# Patient Record
Sex: FEMALE | Race: WHITE | NOT HISPANIC OR LATINO | Employment: OTHER | URBAN - METROPOLITAN AREA
[De-identification: names, ages, dates, MRNs, and addresses within clinical notes are randomized per-mention and may not be internally consistent; named-entity substitution may affect disease eponyms.]

---

## 2017-09-06 ENCOUNTER — OFFICE VISIT (OUTPATIENT)
Dept: URGENT CARE | Facility: PHYSICIAN GROUP | Age: 82
End: 2017-09-06
Payer: COMMERCIAL

## 2017-09-06 VITALS
DIASTOLIC BLOOD PRESSURE: 64 MMHG | OXYGEN SATURATION: 93 % | SYSTOLIC BLOOD PRESSURE: 128 MMHG | RESPIRATION RATE: 24 BRPM | HEART RATE: 78 BPM | TEMPERATURE: 98.3 F | WEIGHT: 115 LBS

## 2017-09-06 DIAGNOSIS — N39.0 URINARY TRACT INFECTION WITH HEMATURIA, SITE UNSPECIFIED: Primary | ICD-10-CM

## 2017-09-06 DIAGNOSIS — R30.0 DYSURIA: ICD-10-CM

## 2017-09-06 DIAGNOSIS — R31.9 URINARY TRACT INFECTION WITH HEMATURIA, SITE UNSPECIFIED: Primary | ICD-10-CM

## 2017-09-06 LAB
APPEARANCE UR: ABNORMAL
BILIRUB UR STRIP-MCNC: ABNORMAL MG/DL
COLOR UR AUTO: YELLOW
GLUCOSE UR STRIP.AUTO-MCNC: ABNORMAL MG/DL
KETONES UR STRIP.AUTO-MCNC: ABNORMAL MG/DL
LEUKOCYTE ESTERASE UR QL STRIP.AUTO: ABNORMAL
NITRITE UR QL STRIP.AUTO: ABNORMAL
PH UR STRIP.AUTO: 6 [PH] (ref 5–8)
PROT UR QL STRIP: 300 MG/DL
RBC UR QL AUTO: ABNORMAL
SP GR UR STRIP.AUTO: 1.03
UROBILINOGEN UR STRIP-MCNC: 0.2 MG/DL

## 2017-09-06 PROCEDURE — 81002 URINALYSIS NONAUTO W/O SCOPE: CPT | Performed by: PHYSICIAN ASSISTANT

## 2017-09-06 PROCEDURE — 99203 OFFICE O/P NEW LOW 30 MIN: CPT | Performed by: PHYSICIAN ASSISTANT

## 2017-09-06 RX ORDER — THYROID 120 MG/1
120 TABLET ORAL DAILY
COMMUNITY
End: 2017-09-08

## 2017-09-06 RX ORDER — ASPIRIN 81 MG/1
81 TABLET, CHEWABLE ORAL DAILY
COMMUNITY
End: 2017-09-08

## 2017-09-06 RX ORDER — MULTIVIT WITH MINERALS/LUTEIN
1 TABLET ORAL DAILY
COMMUNITY
End: 2017-09-08

## 2017-09-06 RX ORDER — ATORVASTATIN CALCIUM 20 MG/1
20 TABLET, FILM COATED ORAL EVERY MORNING
Status: ON HOLD | COMMUNITY
End: 2020-02-12

## 2017-09-06 RX ORDER — METOPROLOL SUCCINATE 100 MG/1
100 TABLET, EXTENDED RELEASE ORAL DAILY
COMMUNITY
End: 2017-09-08

## 2017-09-06 RX ORDER — VALSARTAN 80 MG/1
80 TABLET ORAL DAILY
COMMUNITY
End: 2017-09-08

## 2017-09-06 RX ORDER — CHLORAL HYDRATE 500 MG
CAPSULE ORAL
COMMUNITY
End: 2017-09-08

## 2017-09-06 RX ORDER — CEFDINIR 300 MG/1
300 CAPSULE ORAL EVERY 12 HOURS
Qty: 14 CAP | Refills: 0 | Status: ON HOLD | OUTPATIENT
Start: 2017-09-06 | End: 2017-09-18

## 2017-09-07 ENCOUNTER — HOSPITAL ENCOUNTER (OUTPATIENT)
Facility: MEDICAL CENTER | Age: 82
End: 2017-09-07
Attending: PHYSICIAN ASSISTANT
Payer: COMMERCIAL

## 2017-09-07 PROCEDURE — 87086 URINE CULTURE/COLONY COUNT: CPT

## 2017-09-07 PROCEDURE — 87077 CULTURE AEROBIC IDENTIFY: CPT

## 2017-09-07 ASSESSMENT — ENCOUNTER SYMPTOMS
FEVER: 0
FLANK PAIN: 0
ABDOMINAL PAIN: 0

## 2017-09-08 ENCOUNTER — APPOINTMENT (OUTPATIENT)
Dept: RADIOLOGY | Facility: MEDICAL CENTER | Age: 82
DRG: 555 | End: 2017-09-08
Attending: EMERGENCY MEDICINE
Payer: COMMERCIAL

## 2017-09-08 ENCOUNTER — RESOLUTE PROFESSIONAL BILLING HOSPITAL PROF FEE (OUTPATIENT)
Dept: HOSPITALIST | Facility: MEDICAL CENTER | Age: 82
End: 2017-09-08
Payer: COMMERCIAL

## 2017-09-08 ENCOUNTER — HOSPITAL ENCOUNTER (INPATIENT)
Facility: MEDICAL CENTER | Age: 82
LOS: 10 days | DRG: 555 | End: 2017-09-18
Attending: EMERGENCY MEDICINE | Admitting: INTERNAL MEDICINE
Payer: COMMERCIAL
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
## 2017-09-08 DIAGNOSIS — N39.0 URINARY TRACT INFECTION WITH HEMATURIA, SITE UNSPECIFIED: ICD-10-CM

## 2017-09-08 DIAGNOSIS — R31.9 URINARY TRACT INFECTION WITH HEMATURIA, SITE UNSPECIFIED: ICD-10-CM

## 2017-09-08 DIAGNOSIS — N30.00 ACUTE CYSTITIS WITHOUT HEMATURIA: ICD-10-CM

## 2017-09-08 DIAGNOSIS — R53.1 WEAKNESS GENERALIZED: ICD-10-CM

## 2017-09-08 DIAGNOSIS — R30.0 DYSURIA: ICD-10-CM

## 2017-09-08 DIAGNOSIS — M25.551 HIP PAIN, RIGHT: ICD-10-CM

## 2017-09-08 PROBLEM — R29.6 FALLS FREQUENTLY: Status: ACTIVE | Noted: 2017-09-08
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
## 2017-09-08 PROBLEM — Z86.79 HISTORY OF AORTIC ANEURYSM: Status: ACTIVE | Noted: 2017-09-08

## 2017-09-08 PROBLEM — I10 HYPERTENSION: Status: ACTIVE | Noted: 2017-09-08

## 2017-09-08 LAB
ALBUMIN SERPL BCP-MCNC: 3.7 G/DL (ref 3.2–4.9)
ALBUMIN/GLOB SERPL: 1.5 G/DL
ALP SERPL-CCNC: 82 U/L (ref 30–99)
ALT SERPL-CCNC: 11 U/L (ref 2–50)
AMORPH CRY #/AREA URNS HPF: PRESENT /HPF
ANION GAP SERPL CALC-SCNC: 13 MMOL/L (ref 0–11.9)
APPEARANCE UR: ABNORMAL
APTT PPP: 28.6 SEC (ref 24.7–36)
AST SERPL-CCNC: 21 U/L (ref 12–45)
BACTERIA #/AREA URNS HPF: ABNORMAL /HPF
BASOPHILS # BLD AUTO: 0.3 % (ref 0–1.8)
BASOPHILS # BLD: 0.03 K/UL (ref 0–0.12)
BILIRUB SERPL-MCNC: 3.9 MG/DL (ref 0.1–1.5)
BILIRUB UR QL STRIP.AUTO: NEGATIVE
BUN SERPL-MCNC: 13 MG/DL (ref 8–22)
CALCIUM SERPL-MCNC: 9.5 MG/DL (ref 8.5–10.5)
CHLORIDE SERPL-SCNC: 104 MMOL/L (ref 96–112)
CO2 SERPL-SCNC: 20 MMOL/L (ref 20–33)
COLOR UR: ABNORMAL
CREAT SERPL-MCNC: 0.58 MG/DL (ref 0.5–1.4)
CULTURE IF INDICATED INDCX: YES UA CULTURE
EOSINOPHIL # BLD AUTO: 0.14 K/UL (ref 0–0.51)
EOSINOPHIL NFR BLD: 1.5 % (ref 0–6.9)
EPI CELLS #/AREA URNS HPF: ABNORMAL /HPF
ERYTHROCYTE [DISTWIDTH] IN BLOOD BY AUTOMATED COUNT: 47.1 FL (ref 35.9–50)
GFR SERPL CREATININE-BSD FRML MDRD: >60 ML/MIN/1.73 M 2
GLOBULIN SER CALC-MCNC: 2.5 G/DL (ref 1.9–3.5)
GLUCOSE SERPL-MCNC: 101 MG/DL (ref 65–99)
GLUCOSE UR STRIP.AUTO-MCNC: NEGATIVE MG/DL
HCT VFR BLD AUTO: 43.2 % (ref 37–47)
HGB BLD-MCNC: 14.7 G/DL (ref 12–16)
IMM GRANULOCYTES # BLD AUTO: 0.11 K/UL (ref 0–0.11)
IMM GRANULOCYTES NFR BLD AUTO: 1.2 % (ref 0–0.9)
INR PPP: 1.22 (ref 0.87–1.13)
KETONES UR STRIP.AUTO-MCNC: ABNORMAL MG/DL
LEUKOCYTE ESTERASE UR QL STRIP.AUTO: ABNORMAL
LYMPHOCYTES # BLD AUTO: 1.84 K/UL (ref 1–4.8)
LYMPHOCYTES NFR BLD: 20 % (ref 22–41)
MCH RBC QN AUTO: 29.3 PG (ref 27–33)
MCHC RBC AUTO-ENTMCNC: 34 G/DL (ref 33.6–35)
MCV RBC AUTO: 86.2 FL (ref 81.4–97.8)
MICRO URNS: ABNORMAL
MONOCYTES # BLD AUTO: 0.79 K/UL (ref 0–0.85)
MONOCYTES NFR BLD AUTO: 8.6 % (ref 0–13.4)
NEUTROPHILS # BLD AUTO: 6.27 K/UL (ref 2–7.15)
NEUTROPHILS NFR BLD: 68.4 % (ref 44–72)
NITRITE UR QL STRIP.AUTO: NEGATIVE
NRBC # BLD AUTO: 0 K/UL
NRBC BLD AUTO-RTO: 0 /100 WBC
PH UR STRIP.AUTO: 5.5 [PH]
PLATELET # BLD AUTO: 205 K/UL (ref 164–446)
PMV BLD AUTO: 10.3 FL (ref 9–12.9)
POTASSIUM SERPL-SCNC: 3.8 MMOL/L (ref 3.6–5.5)
PROT SERPL-MCNC: 6.2 G/DL (ref 6–8.2)
PROT UR QL STRIP: 100 MG/DL
PROTHROMBIN TIME: 15.8 SEC (ref 12–14.6)
RBC # BLD AUTO: 5.01 M/UL (ref 4.2–5.4)
RBC # URNS HPF: ABNORMAL /HPF
RBC UR QL AUTO: ABNORMAL
SODIUM SERPL-SCNC: 137 MMOL/L (ref 135–145)
SP GR UR STRIP.AUTO: 1.02
UROBILINOGEN UR STRIP.AUTO-MCNC: 1 MG/DL
WBC # BLD AUTO: 9.2 K/UL (ref 4.8–10.8)
WBC #/AREA URNS HPF: ABNORMAL /HPF

## 2017-09-08 PROCEDURE — 73551 X-RAY EXAM OF FEMUR 1: CPT | Mod: RT

## 2017-09-08 PROCEDURE — 81001 URINALYSIS AUTO W/SCOPE: CPT

## 2017-09-08 PROCEDURE — 71010 DX-CHEST-LIMITED (1 VIEW): CPT

## 2017-09-08 PROCEDURE — 96365 THER/PROPH/DIAG IV INF INIT: CPT

## 2017-09-08 PROCEDURE — 85610 PROTHROMBIN TIME: CPT

## 2017-09-08 PROCEDURE — 700102 HCHG RX REV CODE 250 W/ 637 OVERRIDE(OP): Performed by: STUDENT IN AN ORGANIZED HEALTH CARE EDUCATION/TRAINING PROGRAM

## 2017-09-08 PROCEDURE — 72170 X-RAY EXAM OF PELVIS: CPT

## 2017-09-08 PROCEDURE — 303105 HCHG CATHETER EXTRA

## 2017-09-08 PROCEDURE — 94760 N-INVAS EAR/PLS OXIMETRY 1: CPT

## 2017-09-08 PROCEDURE — 770020 HCHG ROOM/CARE - TELE (206)

## 2017-09-08 PROCEDURE — 87086 URINE CULTURE/COLONY COUNT: CPT

## 2017-09-08 PROCEDURE — 85730 THROMBOPLASTIN TIME PARTIAL: CPT

## 2017-09-08 PROCEDURE — 93005 ELECTROCARDIOGRAM TRACING: CPT | Performed by: EMERGENCY MEDICINE

## 2017-09-08 PROCEDURE — 51702 INSERT TEMP BLADDER CATH: CPT

## 2017-09-08 PROCEDURE — 700111 HCHG RX REV CODE 636 W/ 250 OVERRIDE (IP): Performed by: EMERGENCY MEDICINE

## 2017-09-08 PROCEDURE — 700105 HCHG RX REV CODE 258: Performed by: STUDENT IN AN ORGANIZED HEALTH CARE EDUCATION/TRAINING PROGRAM

## 2017-09-08 PROCEDURE — A9270 NON-COVERED ITEM OR SERVICE: HCPCS | Performed by: STUDENT IN AN ORGANIZED HEALTH CARE EDUCATION/TRAINING PROGRAM

## 2017-09-08 PROCEDURE — 99285 EMERGENCY DEPT VISIT HI MDM: CPT

## 2017-09-08 PROCEDURE — 80053 COMPREHEN METABOLIC PANEL: CPT

## 2017-09-08 PROCEDURE — 304561 HCHG STAT O2

## 2017-09-08 PROCEDURE — 36415 COLL VENOUS BLD VENIPUNCTURE: CPT

## 2017-09-08 PROCEDURE — 700105 HCHG RX REV CODE 258: Performed by: EMERGENCY MEDICINE

## 2017-09-08 PROCEDURE — 85025 COMPLETE CBC W/AUTO DIFF WBC: CPT

## 2017-09-08 RX ORDER — BISACODYL 10 MG
10 SUPPOSITORY, RECTAL RECTAL
Status: DISCONTINUED | OUTPATIENT
Start: 2017-09-08 | End: 2017-09-18 | Stop reason: HOSPADM

## 2017-09-08 RX ORDER — LEVOTHYROXINE SODIUM 0.1 MG/1
100 TABLET ORAL
Status: DISCONTINUED | OUTPATIENT
Start: 2017-09-09 | End: 2017-09-18 | Stop reason: HOSPADM

## 2017-09-08 RX ORDER — MULTIVIT-MIN/IRON/FOLIC ACID/K 18-600-40
2000 CAPSULE ORAL EVERY MORNING
Status: ON HOLD | COMMUNITY
End: 2020-02-12

## 2017-09-08 RX ORDER — HYDROCHLOROTHIAZIDE 12.5 MG/1
12.5 TABLET ORAL
Status: DISCONTINUED | OUTPATIENT
Start: 2017-09-09 | End: 2017-09-08

## 2017-09-08 RX ORDER — VALSARTAN 80 MG/1
160 TABLET ORAL
Status: DISCONTINUED | OUTPATIENT
Start: 2017-09-09 | End: 2017-09-11

## 2017-09-08 RX ORDER — VALSARTAN AND HYDROCHLOROTHIAZIDE 160; 12.5 MG/1; MG/1
1 TABLET, FILM COATED ORAL EVERY MORNING
Status: ON HOLD | COMMUNITY
End: 2017-09-18

## 2017-09-08 RX ORDER — AMOXICILLIN 250 MG
2 CAPSULE ORAL 2 TIMES DAILY
Status: DISCONTINUED | OUTPATIENT
Start: 2017-09-09 | End: 2017-09-18 | Stop reason: HOSPADM

## 2017-09-08 RX ORDER — ASPIRIN 81 MG/1
81 TABLET, CHEWABLE ORAL EVERY EVENING
Status: DISCONTINUED | OUTPATIENT
Start: 2017-09-08 | End: 2017-09-11

## 2017-09-08 RX ORDER — METOPROLOL SUCCINATE 200 MG/1
100 TABLET, EXTENDED RELEASE ORAL
COMMUNITY
End: 2020-02-07

## 2017-09-08 RX ORDER — CEFTRIAXONE 1 G/1
1 INJECTION, POWDER, FOR SOLUTION INTRAMUSCULAR; INTRAVENOUS ONCE
Status: COMPLETED | OUTPATIENT
Start: 2017-09-08 | End: 2017-09-08

## 2017-09-08 RX ORDER — SODIUM CHLORIDE 9 MG/ML
INJECTION, SOLUTION INTRAVENOUS CONTINUOUS
Status: DISCONTINUED | OUTPATIENT
Start: 2017-09-08 | End: 2017-09-16

## 2017-09-08 RX ORDER — MELATONIN 10 MG
1 CAPSULE ORAL
COMMUNITY
End: 2018-09-06

## 2017-09-08 RX ORDER — LEVOTHYROXINE SODIUM 0.1 MG/1
100 TABLET ORAL
Status: ON HOLD | COMMUNITY
End: 2020-02-12

## 2017-09-08 RX ORDER — CEFDINIR 300 MG/1
300 CAPSULE ORAL EVERY 12 HOURS
Status: DISCONTINUED | OUTPATIENT
Start: 2017-09-08 | End: 2017-09-11

## 2017-09-08 RX ORDER — ATORVASTATIN CALCIUM 20 MG/1
20 TABLET, FILM COATED ORAL NIGHTLY
Status: DISCONTINUED | OUTPATIENT
Start: 2017-09-08 | End: 2017-09-11

## 2017-09-08 RX ORDER — CALCIUM CITRATE/VITAMIN D3 200MG-6.25
1 TABLET ORAL EVERY MORNING
COMMUNITY
End: 2018-09-06

## 2017-09-08 RX ORDER — POLYETHYLENE GLYCOL 3350 17 G/17G
1 POWDER, FOR SOLUTION ORAL
Status: DISCONTINUED | OUTPATIENT
Start: 2017-09-08 | End: 2017-09-18 | Stop reason: HOSPADM

## 2017-09-08 RX ORDER — SODIUM CHLORIDE 9 MG/ML
INJECTION, SOLUTION INTRAVENOUS CONTINUOUS
Status: DISCONTINUED | OUTPATIENT
Start: 2017-09-08 | End: 2017-09-08

## 2017-09-08 RX ORDER — VALSARTAN AND HYDROCHLOROTHIAZIDE 160; 12.5 MG/1; MG/1
1 TABLET, FILM COATED ORAL EVERY MORNING
Status: DISCONTINUED | OUTPATIENT
Start: 2017-09-09 | End: 2017-09-08

## 2017-09-08 RX ADMIN — SODIUM CHLORIDE: 9 INJECTION, SOLUTION INTRAVENOUS at 23:45

## 2017-09-08 RX ADMIN — CEFDINIR 300 MG: 300 CAPSULE ORAL at 22:29

## 2017-09-08 RX ADMIN — CEFTRIAXONE SODIUM 1 G: 1 INJECTION, POWDER, FOR SOLUTION INTRAMUSCULAR; INTRAVENOUS at 19:29

## 2017-09-08 RX ADMIN — ASPIRIN 81 MG: 81 TABLET, CHEWABLE ORAL at 22:29

## 2017-09-08 RX ADMIN — ATORVASTATIN CALCIUM 20 MG: 20 TABLET, FILM COATED ORAL at 22:29

## 2017-09-08 RX ADMIN — SODIUM CHLORIDE: 9 INJECTION, SOLUTION INTRAVENOUS at 19:00

## 2017-09-08 ASSESSMENT — PATIENT HEALTH QUESTIONNAIRE - PHQ9
SUM OF ALL RESPONSES TO PHQ9 QUESTIONS 1 AND 2: 0
SUM OF ALL RESPONSES TO PHQ QUESTIONS 1-9: 0
1. LITTLE INTEREST OR PLEASURE IN DOING THINGS: NOT AT ALL
2. FEELING DOWN, DEPRESSED, IRRITABLE, OR HOPELESS: NOT AT ALL

## 2017-09-08 ASSESSMENT — ENCOUNTER SYMPTOMS
ORTHOPNEA: 0
FOCAL WEAKNESS: 0
TREMORS: 1
WEAKNESS: 1
WEIGHT LOSS: 0
PND: 0
CONSTIPATION: 0
HEARTBURN: 0
HEADACHES: 0
SORE THROAT: 0
PALPITATIONS: 0
FLANK PAIN: 0
DIARRHEA: 0
NERVOUS/ANXIOUS: 0
VOMITING: 0
SEIZURES: 0
BLURRED VISION: 0
NAUSEA: 0
DIZZINESS: 0
SHORTNESS OF BREATH: 0
STRIDOR: 0
DOUBLE VISION: 0
ABDOMINAL PAIN: 0
FEVER: 0
COUGH: 0
BLOOD IN STOOL: 0
FALLS: 1
SPEECH CHANGE: 0
CHILLS: 0
WHEEZING: 0
LOSS OF CONSCIOUSNESS: 0

## 2017-09-08 ASSESSMENT — LIFESTYLE VARIABLES
ALCOHOL_USE: NO
DO YOU DRINK ALCOHOL: NO
EVER_SMOKED: NEVER

## 2017-09-08 ASSESSMENT — PAIN SCALES - GENERAL
PAINLEVEL_OUTOF10: 1
PAINLEVEL_OUTOF10: 1

## 2017-09-08 NOTE — PROGRESS NOTES
Subjective:      Aurora Farias is a 85 y.o. female who presents with Dysuria (dark urine, foul odor)    Pt PMH, SocHx, SurgHx, FamHx, Drug allergies and medications reviewed with pt/EPIC.      Family history reviewed, it is not pertinent to this complaint.           Patient presents with:  Dysuria: dark urine, foul odor for a few days.           UTI   This is a new problem. The current episode started in the past 7 days. The problem occurs constantly. The problem has been unchanged. Associated symptoms include urinary symptoms. Pertinent negatives include no abdominal pain or fever. Nothing aggravates the symptoms. She has tried drinking for the symptoms. The treatment provided no relief.       Review of Systems   Constitutional: Negative for fever.   Gastrointestinal: Negative for abdominal pain.   Genitourinary: Positive for dysuria, frequency and urgency. Negative for flank pain and hematuria.   All other systems reviewed and are negative.         Objective:     /64   Pulse 78   Temp 36.8 °C (98.3 °F)   Resp (!) 24   Wt 52.2 kg (115 lb)   SpO2 93%      Physical Exam   Constitutional: She is oriented to person, place, and time. She appears well-developed and well-nourished. No distress.   HENT:   Head: Normocephalic and atraumatic.   Nose: Nose normal.   Mouth/Throat: Oropharynx is clear and moist.   Eyes: EOM are normal. Pupils are equal, round, and reactive to light.   Neck: Normal range of motion. Neck supple.   Cardiovascular: Normal rate, regular rhythm and normal heart sounds.    Pulmonary/Chest: Effort normal and breath sounds normal.   Abdominal: Soft. Bowel sounds are normal. There is no tenderness. There is no rebound and no guarding.   Musculoskeletal: Normal range of motion.   Neurological: She is alert and oriented to person, place, and time.   Skin: Skin is warm and dry. Capillary refill takes less than 2 seconds.   Psychiatric: She has a normal mood and affect.     UA: +LE, +  nitrites, blood     Assessment/Plan:     1. Urinary tract infection with hematuria, site unspecified  Urine Culture    cefdinir (OMNICEF) 300 MG Cap   2. Dysuria  POCT Urinalysis    Urine Culture    cefdinir (OMNICEF) 300 MG Cap     Will call with any changes after urine culture results are received.      PT should follow up with PCP in 1-2 days for re-evaluation if symptoms have not improved.  Discussed red flags and reasons to return to UC or ED.  Pt and/or family verbalized understanding of diagnosis and follow up instructions and declined informational handout on diagnosis.  PT discharged.

## 2017-09-09 ENCOUNTER — APPOINTMENT (OUTPATIENT)
Dept: RADIOLOGY | Facility: MEDICAL CENTER | Age: 82
DRG: 555 | End: 2017-09-09
Attending: STUDENT IN AN ORGANIZED HEALTH CARE EDUCATION/TRAINING PROGRAM
Payer: COMMERCIAL

## 2017-09-09 PROBLEM — I71.9 AORTIC ANEURYSM (HCC): Status: ACTIVE | Noted: 2017-09-09

## 2017-09-09 PROBLEM — E86.0 DEHYDRATION: Status: ACTIVE | Noted: 2017-09-09

## 2017-09-09 PROBLEM — E87.29 HIGH ANION GAP METABOLIC ACIDOSIS: Status: ACTIVE | Noted: 2017-09-09

## 2017-09-09 PROCEDURE — 700102 HCHG RX REV CODE 250 W/ 637 OVERRIDE(OP): Performed by: STUDENT IN AN ORGANIZED HEALTH CARE EDUCATION/TRAINING PROGRAM

## 2017-09-09 PROCEDURE — 770020 HCHG ROOM/CARE - TELE (206)

## 2017-09-09 PROCEDURE — 700111 HCHG RX REV CODE 636 W/ 250 OVERRIDE (IP): Performed by: STUDENT IN AN ORGANIZED HEALTH CARE EDUCATION/TRAINING PROGRAM

## 2017-09-09 PROCEDURE — 99222 1ST HOSP IP/OBS MODERATE 55: CPT | Mod: GC | Performed by: INTERNAL MEDICINE

## 2017-09-09 PROCEDURE — 700105 HCHG RX REV CODE 258: Performed by: STUDENT IN AN ORGANIZED HEALTH CARE EDUCATION/TRAINING PROGRAM

## 2017-09-09 PROCEDURE — 70450 CT HEAD/BRAIN W/O DYE: CPT

## 2017-09-09 PROCEDURE — A9270 NON-COVERED ITEM OR SERVICE: HCPCS | Performed by: STUDENT IN AN ORGANIZED HEALTH CARE EDUCATION/TRAINING PROGRAM

## 2017-09-09 RX ORDER — POLYETHYLENE GLYCOL 3350 17 G/17G
1 POWDER, FOR SOLUTION ORAL ONCE
Status: COMPLETED | OUTPATIENT
Start: 2017-09-09 | End: 2017-09-09

## 2017-09-09 RX ADMIN — CEFDINIR 300 MG: 300 CAPSULE ORAL at 08:42

## 2017-09-09 RX ADMIN — LEVOTHYROXINE SODIUM 100 MCG: 100 TABLET ORAL at 07:00

## 2017-09-09 RX ADMIN — STANDARDIZED SENNA CONCENTRATE AND DOCUSATE SODIUM 2 TABLET: 8.6; 5 TABLET, FILM COATED ORAL at 08:43

## 2017-09-09 RX ADMIN — VALSARTAN 160 MG: 80 TABLET ORAL at 08:41

## 2017-09-09 RX ADMIN — ATORVASTATIN CALCIUM 20 MG: 20 TABLET, FILM COATED ORAL at 21:43

## 2017-09-09 RX ADMIN — POLYETHYLENE GLYCOL 3350 1 PACKET: 17 POWDER, FOR SOLUTION ORAL at 21:44

## 2017-09-09 RX ADMIN — SODIUM CHLORIDE: 9 INJECTION, SOLUTION INTRAVENOUS at 06:40

## 2017-09-09 RX ADMIN — STANDARDIZED SENNA CONCENTRATE AND DOCUSATE SODIUM 2 TABLET: 8.6; 5 TABLET, FILM COATED ORAL at 21:43

## 2017-09-09 RX ADMIN — CEFDINIR 300 MG: 300 CAPSULE ORAL at 21:43

## 2017-09-09 RX ADMIN — ENOXAPARIN SODIUM 40 MG: 100 INJECTION SUBCUTANEOUS at 08:40

## 2017-09-09 RX ADMIN — SODIUM CHLORIDE: 9 INJECTION, SOLUTION INTRAVENOUS at 21:46

## 2017-09-09 RX ADMIN — ASPIRIN 81 MG: 81 TABLET, CHEWABLE ORAL at 21:43

## 2017-09-09 RX ADMIN — SODIUM CHLORIDE: 9 INJECTION, SOLUTION INTRAVENOUS at 13:29

## 2017-09-09 ASSESSMENT — COPD QUESTIONNAIRES
DO YOU EVER COUGH UP ANY MUCUS OR PHLEGM?: NO/ONLY WITH OCCASIONAL COLDS OR INFECTIONS
DURING THE PAST 4 WEEKS HOW MUCH DID YOU FEEL SHORT OF BREATH: NONE/LITTLE OF THE TIME
HAVE YOU SMOKED AT LEAST 100 CIGARETTES IN YOUR ENTIRE LIFE: NO/DON'T KNOW
COPD SCREENING SCORE: 3

## 2017-09-09 ASSESSMENT — PATIENT HEALTH QUESTIONNAIRE - PHQ9
1. LITTLE INTEREST OR PLEASURE IN DOING THINGS: NOT AT ALL
SUM OF ALL RESPONSES TO PHQ QUESTIONS 1-9: 0
SUM OF ALL RESPONSES TO PHQ9 QUESTIONS 1 AND 2: 0

## 2017-09-09 ASSESSMENT — ENCOUNTER SYMPTOMS
ORTHOPNEA: 0
SHORTNESS OF BREATH: 0
MEMORY LOSS: 1
BLURRED VISION: 0
CONSTIPATION: 0
CHILLS: 0
ABDOMINAL PAIN: 1
TINGLING: 0
SORE THROAT: 0
HEADACHES: 0
EYE PAIN: 0
SPUTUM PRODUCTION: 0
VOMITING: 0
DIZZINESS: 0
COUGH: 0
LOSS OF CONSCIOUSNESS: 0
NAUSEA: 0
CONSTIPATION: 1
SPEECH CHANGE: 0
SENSORY CHANGE: 0
FEVER: 0
DIARRHEA: 0
PALPITATIONS: 0
TREMORS: 0
WEAKNESS: 1
FLANK PAIN: 0
DOUBLE VISION: 0
WHEEZING: 0

## 2017-09-09 ASSESSMENT — PAIN SCALES - GENERAL
PAINLEVEL_OUTOF10: 0
PAINLEVEL_OUTOF10: 0
PAINLEVEL_OUTOF10: 3

## 2017-09-09 ASSESSMENT — LIFESTYLE VARIABLES: EVER_SMOKED: NEVER

## 2017-09-09 NOTE — PROGRESS NOTES
Internal Medicine Interval Note    Name Aurora Farias 10/19/1931   Age/Sex 85 y.o. female   MRN [id number]   Code Status DNR     After 5PM or if no immediate response to page, please call for cross-coverage  Attending/Team: Irena See Patient List for primary contact information  Call [phone] to page    1st Call - Day Intern (R1):   Dr. Dalal 2nd Call - Day Sr. Resident (R2/R3):   Dr. Beyer         Reason for interval visit  (Principal Problem)   Fall from ground level    Interval Problem Daily Status Update  (24 hours)   Per nursing pt had urinary incontinence x1 but is other times able to tell them when she needs to urinate.  Pt seen this morning on rounds, alert and oriented to self, situation, place. Not oriented to month or year, relatively confused and tired.   Pt c/o no BM since Tuesday.  Daughter arrived in the afternoon and was able to provide some information regarding her mother's baseline. Daughter states that the pt has had confusion for around 5 months, and daughter has noticed a decline since 3 months ago however the pt has only recently started living with the daughter and has not noticed a change in her condition since Wednesday. She would like to explore the possibility of her mother going to a SNF.   Daughter also informed us that her mother had a CT scan done in July due to a GLF with head injury. Records obtained from [facility] show a partially calcified extra-axial mass measuring 1.7cm c/w meningioma.    Review of Systems   Constitutional: Negative for chills and fever.   HENT: Negative for congestion and sore throat.    Eyes: Negative for blurred vision, double vision and pain.   Respiratory: Negative for cough and shortness of breath.    Cardiovascular: Negative for chest pain, palpitations and leg swelling.   Gastrointestinal: Positive for abdominal pain and constipation. Negative for diarrhea, nausea and vomiting.   Genitourinary: Negative for  dysuria and flank pain.   Skin: Negative.    Neurological: Positive for weakness. Negative for headaches.       Consultants/Specialty  None.    Disposition  Inpatient.    Quality Measures    Reviewed items::  EKG reviewed, Labs reviewed, Medications reviewed and Radiology images reviewed  Nance catheter::  No Nance  DVT prophylaxis pharmacological::  Enoxaparin (Lovenox)          Physical Exam       Vitals:    09/09/17 0400 09/09/17 0531 09/09/17 0800 09/09/17 1151   BP: 142/62  150/52 121/65   Pulse: 63 (!) 54 61 80   Resp: 18 18 17 16   Temp: 36.4 °C (97.5 °F)  36.2 °C (97.1 °F) 36.4 °C (97.5 °F)   SpO2: 92% 97% 90% 90%   Weight:       Height:         Body mass index is 23 kg/m². Weight: 58.9 kg (129 lb 13.6 oz)  Oxygen Therapy:  Pulse Oximetry: 90 %, O2 (LPM): 0, O2 Delivery: None (Room Air)    Physical Exam   Constitutional:   Frail, thin. No acute distress.   HENT:   Head: Normocephalic and atraumatic.   Mouth/Throat: Mucous membranes are dry.   Neck: No JVD present. No tracheal deviation present.   Cardiovascular: Normal rate and regular rhythm.    Murmur (systolic 2/6) heard.  Pulmonary/Chest: Effort normal and breath sounds normal. She has no wheezes. She has no rales.   Abdominal: Soft. There is tenderness (epigastrium).   Lymphadenopathy:     She has cervical adenopathy.   Neurological: She is alert. No cranial nerve deficit. GCS score is 15.   Oriented to self, person, location, situation. Not to date or month.   Skin: Skin is warm and dry.         Lab Data Review:         9/9/2017  1:08 PM    Recent Labs      09/08/17   1821   SODIUM  137   POTASSIUM  3.8   CHLORIDE  104   CO2  20   BUN  13   CREATININE  0.58   CALCIUM  9.5       Recent Labs      09/08/17   1821   ALTSGPT  11   ASTSGOT  21   ALKPHOSPHAT  82   TBILIRUBIN  3.9*   GLUCOSE  101*       Recent Labs      09/08/17   1821   RBC  5.01   HEMOGLOBIN  14.7   HEMATOCRIT  43.2   PLATELETCT  205   PROTHROMBTM  15.8*   APTT  28.6   INR  1.22*        Recent Labs      09/08/17   1821   WBC  9.2   NEUTSPOLYS  68.40   LYMPHOCYTES  20.00*   MONOCYTES  8.60   EOSINOPHILS  1.50   BASOPHILS  0.30   ASTSGOT  21   ALTSGPT  11   ALKPHOSPHAT  82   TBILIRUBIN  3.9*           Assessment/Plan     * Fall from ground level- (present on admission)   Assessment & Plan    Patient presented with GLF; mechanical trying to sit in chair, with no prior dizziness/palpitation/syncope/seizure.  Increased falls/unsteady gait over past few months. Of note, CT-head from July at [facility] showed incidental finding 1.7cm mass likely meningioma.  Orthostatics negative.  Right hip pain and right shoulder pain after the fall.  X ray pelvis showed no pelvic fracture,  X Ray femur no fracture.  Plan:  IVF   CT head w/o to evaluate for possible subdural, worsening mass/meningioma, or NPH.  Daughter would like to look into SNF placement.  PT/OT consult to evaluate for possible SNF        anion gap metabolic acidosis- (present on admission)   Assessment & Plan    AG 13 w/ normal lytes  liekly secondary to dehydration and poor oral intake.  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  Likely chronic given the patients history of aortic dissection.          Dehydration- (present on admission)   Assessment & Plan    Patient presented with Poor oral intake and loss of apetitite.  Clinically dry on exam  On home HCTZ  Dehydration not likely the direct cause of her fall as there was no dizzyness/lightheadedness reported, however fluids should still be given  Plan:  IV fluids started at 150, decrease today to 100ml/hr  Currently holding metoprolol and HCTZ          Hypertension- (present on admission)   Assessment & Plan    Past history of hypertension.  Hold home metoprolol and hydrochlorothiazide.   Continue valsartan.           Urinary tract infection- (present on admission)   Assessment & Plan    Recent history UTI presented to the urgent care with symptoms of dysuria ,hematuria and dark color urine.  Urinalysis showed positive leucocyte esterase. Culture not done at urgent care, culture drawn here is pending.  Currently on Cefdinir started 9/8.  Continue home cefdinir 300 mg.

## 2017-09-09 NOTE — SENIOR ADMIT NOTE
"[facility] SENIOR ADMIT NOTE:    Patient ID:   Name:             Aurora Farias     YOB: 1931  Age:                 85 y.o.  female   MRN:               [id number]                                                          Chief Complaint:       Frequent falls and recent UTI    History of Present Illness:    Mr. Farias is a 85 y.o. female with a PMHx of aortic dissection s/p repair, hypothyroidism, femoral fracture s/p internal fixation, osteopenia and recent diagnosis of UTI, who presents after a GLF. Per patient and family, she has frequent falls (3-4) during the last few months, seems to be mechanical without associated dizziness, palpitations or syncope. Family notes poor oral intake and gait instability on ambulation, with most recent incident earlier today, where patient was trying to sit on a chair and slipped out of chair and fell to ground on her right side, no reported head trauma, confusion, seizures or syncope.   Of note, patient was confused few days back, went to , diagnosed with urinary tract infection and started on Cefdinir (initial dose was yesterday)     ROS: Positive for falls, weakness, unsteady gait   EX: Looks very dry, systolic ejection murmur, no focal deficits   LABS: Positive for normal CBC and BMP, agap 13, INR 1.22, UA: LE positive    IMAGING: No femoral fracture. Aortic aneurysm on CXR  EKG:Non-specific ST/T wave changes with LVH and LAD    Active Ambulatory Problems     Diagnosis Date Noted   • No Active Ambulatory Problems     Resolved Ambulatory Problems     Diagnosis Date Noted   • No Resolved Ambulatory Problems     Past Medical History:   Diagnosis Date   • Aortic aneurysm without rupture (CMS-[facility])    • Hypertension    • Thyroid disorder        PHYSICAL EXAM  Vitals:   Weight/BMI: Body mass index is 26.57 kg/m².  Blood pressure 117/55, pulse 63, temperature 36.4 °C (97.5 °F), resp. rate 18, height 1.6 m (5' 3\"), weight 68 kg (150 lb), SpO2 98 " %.  Vitals:    09/08/17 1928 09/08/17 1930 09/08/17 2000 09/08/17 2030   BP:       Pulse:  64 60 63   Resp:       Temp:       SpO2:  97% 97% 98%   Weight: 68 kg (150 lb)      Height:         Oxygen Therapy:  Pulse Oximetry: 98 %, O2 (LPM): 2, O2 Delivery: Nasal Cannula      IMPRESSION  86 yo F with PMHx of aortic dissection presented after GLF with recent history of confusion and UTI, seems to be related to dehydration and physical deconditioning, will admit patient to tele and observe her overnight or any rhythm disorder and continue oral antibiotic therapy    ASSESSMENT:  - GLF (mechanical)  - Physical deconditioning   - Dehydration   - ?encephalopathy/UTI (resolving)   - AGMA (ketosis)  - Aortic aneurysm   - HTN  - H/o femoral fracture    PLAN:  - Admit to tele  - IV fluid therapy   - Orthostatic vitals  - Continue oral antibiotic therapy   - Continue home meds, hold metoprolol and HCTZ due low BP  - Check HbA1c, lipid panel, TSH  - PT/OT consult   - Primary team to consider CT chest for aortic aneurysm and to obtain old records     Please refer to Interns Dr. Olmedo H&P for complete admission details.

## 2017-09-09 NOTE — PROGRESS NOTES
Received report from night shift RN, pt is resting in bed, unlabored breathing and no signs of distress, call light and personal belongings in reach, bed alarm in place.

## 2017-09-09 NOTE — ASSESSMENT & PLAN NOTE
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  Likely chronic given the patients history of aortic dissection

## 2017-09-09 NOTE — FLOWSHEET NOTE
09/09/17 0531   Co-Sign Preceptor   Documentation reviewed by Preceptor? Yes   Patient History   Pulmonary Diagnosis no   Home O2 Use Prior To Admission? No   Home Treatments/Frequency No   COPD Risk Screening   Do you have a history of COPD? No   COPD Population Screener   During the past 4 weeks, how much did you feel short of breath? 0   Do you ever cough up any mucus or phlegm? 0   In the past 12 months, you do less than you used to because of your breathing problems 1   Have you smoked at least 100 cigarettes in your entire life? 0   How old are you? 2   COPD Screening Score 3   Smoking History   Have you Ever Smoked Never   Level Of Consciousness   Level of Consciousness Confused   Respiratory WDL   Respiratory (WDL) X   Chest Exam   Work Of Breathing / Effort Mild   Respiration 18   Pulse (!) 54   Heart Rate (Monitored) (!) 54   Breath Sounds   Pre/Post Intervention Pre Intervention Assessment   RUL Breath Sounds Clear   RML Breath Sounds Clear;Diminished   RLL Breath Sounds Diminished   JESUS Breath Sounds Clear   LLL Breath Sounds Diminished   O2 Protocol   O2 (LPM) 0   Pulse Oximetry 97 %

## 2017-09-09 NOTE — ASSESSMENT & PLAN NOTE
Recent history UTI presented to the urgent care with symptoms of dysuria ,hematuria and dark color urine. Urinalysis showed positive leucocyte esterase. Neg UCx2. Pt s/p 5 days of abx: 5 doses of cefdinir (9/8-9/10) and 3 doses of ceftriaxone (9/8, 9/11, 9/12). She currently denies any symptoms.  - Antibiotics d/c'ed 9/12/2017

## 2017-09-09 NOTE — ASSESSMENT & PLAN NOTE
Patient presented with GLF; mechanical trying to sit in chair, with no prior dizziness/palpitation/syncope/seizure. Increased falls/unsteady gait over past few months. CT-head from 07/17 at [facility] showed incidental finding 1.7cm mass likely meningioma. X ray pelvis showed no pelvic fracture,  X Ray femur no fracture. CT head w/o shows no change in meningioma from July. Daughter reports increased frequency of falls.  - PT/OT eval recommend SNF   - Pt awaiting SNF placement

## 2017-09-09 NOTE — ASSESSMENT & PLAN NOTE
Patient presented with Poor oral intake and loss of apetitite, clinically dry  Dehydration not likely the direct cause of her fall as there was no dizzyness/lightheadedness reported  Continue IVF  Currently holding metoprolol and HCTZ

## 2017-09-09 NOTE — ED PROVIDER NOTES
ED Provider Note    CHIEF COMPLAINT  Chief Complaint   Patient presents with   • Hip Pain       HPI  Aurora Farias is a 85 y.o. female who presentsTo the emergency department after falling down at home and now complaining of right hip pain. The patient is a very poor historian but says that she feels very tired and she tried to sit down in a chair at home but apparently missed the chair and landed on her right hip. Usually she gets around with a walker. The patient has had previous surgery on the right hip. The patient's daughter has arrived and says that the patient just moved to this area and she was seen 2 days ago at an urgent care and diagnosed with urinary tract infection and started on Omnicef. Subsequently the patient seems confused and is very tired and today fell as noted above.    REVIEW OF SYSTEMS the patient did not strike her head or lose consciousness no neck or spine pain or chest pain or difficulty breathing. Apparently she initially complained of right shoulder pain but now is moving her shoulder and says it does not hurt. All other systems negative    PAST MEDICAL HISTORY  Past Medical History:   Diagnosis Date   • Aortic aneurysm without rupture (CMS-[facility])     also 2nd AA   • Hypertension    • Thyroid disorder        FAMILY HISTORY  History reviewed. No pertinent family history.    SOCIAL HISTORY  Social History     Social History   • Marital status:      Spouse name: N/A   • Number of children: N/A   • Years of education: N/A     Social History Main Topics   • Smoking status: Never Smoker   • Smokeless tobacco: Never Used   • Alcohol use No   • Drug use: No   • Sexual activity: Not on file     Other Topics Concern   • Not on file     Social History Narrative   • No narrative on file       SURGICAL HISTORY  Past Surgical History:   Procedure Laterality Date   • OTHER CARDIAC SURGERY      aneurysm repair       CURRENT MEDICATIONS  Home Medications    **Home medications have not yet been  "reviewed for this encounter**         ALLERGIES  Allergies   Allergen Reactions   • Penicillins        PHYSICAL EXAM  VITAL SIGNS: /55   Pulse 64   Temp 36.4 °C (97.5 °F)   Resp 18   Ht 1.6 m (5' 3\")   Wt 68 kg (150 lb)   SpO2 97%   BMI 26.57 kg/m²    Oxygen saturation is interpreted asAdequate  Constitutional: The patient is awake she is verbal but she seems very weak  HENT: I don't see any sign of trauma to the head mucous membranes are dry  Eyes: No erythema or discharge or jaundice  Neck: Trachea midline no JVD no C-spine tenderness  Cardiovascular: Regular rate and rhythm  Lungs: Clear and equal bilaterally with no apparent difficulty breathing  Abdomen/Back: Soft nontender nondistended, the pelvis is stable  Skin: Warm and dry  Musculoskeletal: The patient is mildly tender over the right hip area but she is moving the hip without any apparent difficulty or much discomfort.  Neurologic: Awake verbal moving extremities but slightly somnolent    CHART REVIEW  I reviewed a urinalysis from 2 days ago which was positive for nitrite and leukocyte esterase at that time and it appears that a urine culture is in process in the laboratory but no results have been reported thus far    Laboratory  In the emergency [facility] guys are obtained a CBC shows white blood count of 9.2 the INR is 1.22 and urinalysis was obtained I catheterization the bladder it is positive for large leukocyte esterase and the microscopic exam shows a packed field of white blood cells and also bacteria.    Radiology  DX-PELVIS-1 OR 2 VIEWS   Final Result      1.  Internal fixation of RIGHT proximal femur.   2.  Diffuse osteopenia.   3.  No pelvic fracture.      DX-FEMUR-1 VIEW RIGHT   Final Result      1.  No fracture of the RIGHT femur.   2.  Prior internal fixation of proximal RIGHT femur.   3.  Moderate to severe degenerative change of RIGHT hip and knee.      DX-CHEST-LIMITED (1 VIEW)   Final Result      1.  " There is aneurysmal dilatation of the thoracic aortic arch, probably chronic.   2.  Possible right mid hemithorax lung nodule.   3.  There are surgical clips in the right axilla.        MEDICAL DECISION MAKING and DISPOSITION  In the emergency department an IV was established the patient was placed on a cardiac monitor she had received intravenous fentanyl from the EMS crew prior to arrival and required oxygen by nasal cannula thereafter. The patient was given intravenous ceftriaxone. I reviewed all the findings with the patient's family and I reviewed the case with the [facility] internal medicine resident and the patient is admitted to the resident service for further evaluation and treatment    IMPRESSION  1. Urinary tract infection  2. Mechanical ground-level fall  3. Right hip pain         Electronically signed by: Samuel Lauren, 9/8/2017 8:01 PM

## 2017-09-09 NOTE — H&P
"      Internal Medicine Admitting History and Physical    Name Aurora Farias 10/19/1931   Age/Sex 85 y.o. female   MRN [id number]   Code Status DNR     After 5PM or if no immediate response to page, please call for cross-coverage  Attending/Team:Jorge Luis Troy MD See Patient List for primary contact information  Call [phone] to page    1st Call - Day Intern (R1):   Jonah Cleveland MD 2nd Call - Day Sr. Resident (R2/R3):   Bogdan Beyer MD       Chief Complaint:  Ground level fall  Right hip pain    HPI:  This is a 85-year-old female with a past medical history of hypertension, hypothyroidism, aortic dissection status post mesh placement , internal fixation of right proximal femur presents to the emergency department after mechanical Ground level fall.  She ambulates with walker at baseline and this evening while she was sitting on the chair she missed the chair.Her Daughter heard that she was saying \"iI am falling, I am falling\" and the daughter saw her falling on her right side.She complained of right hip pain and shoulder pain soon after the fall. She had two episodes of fall this week and off late she has been having increased episodes of falls in the past five months. She denies dizziness, lightheadedness or palpitations prior to the fall.  At the time of evaluation she does not have right hip pain and right shoulder pain.She denies head trauma, confusion, syncope or seizures after the fall.Her daughter notices unsteady gait on ambulating and thinks it has been increased progressively over the past three years.  She is into the urgent care yesterday for complaints of dysuria, hematuria and frequency. She is currently being treated for UTI and is taking antibiotics.  Since two days she is more confused per daughter and she noticed decreased oral intake of water and food.  She has a past history of aortic dissection and placement of mesh.  She denies chest pain, shortness of breath, " fever, chills , flank pain, nausea and vomiting.    Review of Systems   Constitutional: Negative for chills, fever and weight loss.   HENT: Negative for congestion and sore throat.    Eyes: Negative for blurred vision and double vision.   Respiratory: Negative for cough, shortness of breath, wheezing and stridor.    Cardiovascular: Negative for chest pain, palpitations, orthopnea, leg swelling and PND.   Gastrointestinal: Negative for abdominal pain, blood in stool, constipation, diarrhea, heartburn, nausea and vomiting.   Genitourinary: Positive for dysuria, frequency and hematuria. Negative for flank pain.   Musculoskeletal: Positive for falls.        Positive for Unsteady gait   Neurological: Positive for tremors and weakness. Negative for dizziness, speech change, focal weakness, seizures, loss of consciousness and headaches.   Psychiatric/Behavioral: The patient is not nervous/anxious.              Past Medical History:   Past Medical History:   Diagnosis Date   • Aortic aneurysm without rupture (CMS-HCC)     also 2nd AA   • Hypertension    • Thyroid disorder        Past Surgical History:  Past Surgical History:   Procedure Laterality Date   • OTHER CARDIAC SURGERY      aneurysm repair       Current Outpatient Medications:  Home Medications     Reviewed by Camila Hopper (Pharmacy Tech) on 09/08/17 at 2001  Med List Status: Complete   Medication Last Dose Status   aspirin 81 MG tablet 9/7/2017 Active   atorvastatin (LIPITOR) 20 MG Tab 9/7/2017 Active   B Complex-C (SUPER B COMPLEX PO) 9/7/2017 Active   cefdinir (OMNICEF) 300 MG Cap 9/8/2017 Active   Cranberry-Vitamin C-Vitamin E (CRANBERRY PLUS VITAMIN C) 4200-20-3 MG-MG-UNIT Cap 9/8/2017 Active   Ferrous Gluconate (IRON) 240 (27 Fe) MG Tab 9/7/2017 Active   levothyroxine (SYNTHROID) 100 MCG Tab 9/8/2017 Active   Melatonin 10 MG Cap 9/7/2017 Active   metoprolol (TOPROL-XL) 200 MG XL tablet 9/7/2017 Active   Multiple Vitamins-Minerals (MULTIVITAMIN  "GUMMIES WOMENS) Chew Tab 9/8/2017 Active   Omega-3 Fatty Acids (OMEGA-3 PO) 9/7/2017 Active   valsartan-hydrochlorothiazide (DIOVAN-HCT) 160-12.5 MG per tablet 9/8/2017 Active   vitamin D (CHOLECALCIFEROL) 1000 UNIT Tab 9/8/2017 Active                Medication Allergy/Sensitivities:  Allergies   Allergen Reactions   • Penicillins          Family History:  History reviewed. No pertinent family history.    Social History:  Social History     Social History   • Marital status:      Spouse name: N/A   • Number of children: N/A   • Years of education: N/A     Occupational History   • Not on file.     Social History Main Topics   • Smoking status: Never Smoker   • Smokeless tobacco: Never Used   • Alcohol use No   • Drug use: No   • Sexual activity: Not on file     Other Topics Concern   • Not on file     Social History Narrative   • No narrative on file     Living situation: Lives with Daughter.  PCP : None.      Physical Exam     Vitals:    09/08/17 1928 09/08/17 1930 09/08/17 2000 09/08/17 2030   BP:       Pulse:  64 60 63   Resp:       Temp:       SpO2:  97% 97% 98%   Weight: 68 kg (150 lb)      Height:         Body mass index is 26.57 kg/m².  /55   Pulse 63   Temp 36.4 °C (97.5 °F)   Resp 18   Ht 1.6 m (5' 3\")   Wt 68 kg (150 lb)   SpO2 98%   BMI 26.57 kg/m²   O2 therapy: Pulse Oximetry: 98 %, O2 (LPM): 2, O2 Delivery: Nasal Cannula    Physical Exam  Constitutional:awake and alert.Appears weak.Not in acute distress.  HEENT: atraumatic, normocephalic. Dry mucus membranes.  Eyes: PERRLA, drooping of the left eyelid is noted. EOM normal.Conjunctivae normal.  Neck: supple no stridor or no tenderness. No lymphadenopathy.  Cardiovascular: regular rate and rhythm with systolic ejection murmur heard.  Respiratory/chest wall: clear to ausculation bilaterally.No respiratory distress, no chest wall tenderness  Abdomen:soft, non tender, non distended and normal bowel sounds are heard.  Musculoskeletal:Normal " range of motion in all major joints.No edema or cyanosis .  Extremities: Normal right hip joint range of motion.Exhibits no tenderness.  Neurologic: alert, awake and oriented to place,person. Cranial nerves 2-12 intact.no focal deficits noted.  Psychiatric:affect normal.      Data Review     Current Records review and summary: Completed    Lab Data Review:  Recent Results (from the past 24 hour(s))   CBC w/ Differential    Collection Time: 09/08/17  6:21 PM   Result Value Ref Range    WBC 9.2 4.8 - 10.8 K/uL    RBC 5.01 4.20 - 5.40 M/uL    Hemoglobin 14.7 12.0 - 16.0 g/dL    Hematocrit 43.2 37.0 - 47.0 %    MCV 86.2 81.4 - 97.8 fL    MCH 29.3 27.0 - 33.0 pg    MCHC 34.0 33.6 - 35.0 g/dL    RDW 47.1 35.9 - 50.0 fL    Platelet Count 205 164 - 446 K/uL    MPV 10.3 9.0 - 12.9 fL    Neutrophils-Polys 68.40 44.00 - 72.00 %    Lymphocytes 20.00 (L) 22.00 - 41.00 %    Monocytes 8.60 0.00 - 13.40 %    Eosinophils 1.50 0.00 - 6.90 %    Basophils 0.30 0.00 - 1.80 %    Immature Granulocytes 1.20 (H) 0.00 - 0.90 %    Nucleated RBC 0.00 /100 WBC    Neutrophils (Absolute) 6.27 2.00 - 7.15 K/uL    Lymphs (Absolute) 1.84 1.00 - 4.80 K/uL    Monos (Absolute) 0.79 0.00 - 0.85 K/uL    Eos (Absolute) 0.14 0.00 - 0.51 K/uL    Baso (Absolute) 0.03 0.00 - 0.12 K/uL    Immature Granulocytes (abs) 0.11 0.00 - 0.11 K/uL    NRBC (Absolute) 0.00 K/uL   Complete Metabolic Panel (CMP)    Collection Time: 09/08/17  6:21 PM   Result Value Ref Range    Sodium 137 135 - 145 mmol/L    Potassium 3.8 3.6 - 5.5 mmol/L    Chloride 104 96 - 112 mmol/L    Co2 20 20 - 33 mmol/L    Anion Gap 13.0 (H) 0.0 - 11.9    Glucose 101 (H) 65 - 99 mg/dL    Bun 13 8 - 22 mg/dL    Creatinine 0.58 0.50 - 1.40 mg/dL    Calcium 9.5 8.5 - 10.5 mg/dL    AST(SGOT) 21 12 - 45 U/L    ALT(SGPT) 11 2 - 50 U/L    Alkaline Phosphatase 82 30 - 99 U/L    Total Bilirubin 3.9 (H) 0.1 - 1.5 mg/dL    Albumin 3.7 3.2 - 4.9 g/dL    Total Protein 6.2 6.0 - 8.2 g/dL    Globulin 2.5 1.9 - 3.5  g/dL    A-G Ratio 1.5 g/dL   Prothrombin (PT/INR)    Collection Time: 17  6:21 PM   Result Value Ref Range    PT 15.8 (H) 12.0 - 14.6 sec    INR 1.22 (H) 0.87 - 1.13   APTT    Collection Time: 17  6:21 PM   Result Value Ref Range    APTT 28.6 24.7 - 36.0 sec   ESTIMATED GFR    Collection Time: 17  6:21 PM   Result Value Ref Range    GFR If African American >60 >60 mL/min/1.73 m 2    GFR If Non African American >60 >60 mL/min/1.73 m 2   EKG (NOW)    Collection Time: 17  6:33 PM   Result Value Ref Range    Report       [facility] Emergency Dept[facility]    Test Date:  2017  Pt Name:    LOUIE ROTH            Department: ER  MRN:        [id number]                      Room:       [facility]  Gender:     F                            Technician: [id number]  :        [id number]                   Requested By:MADELAINE SLAUGHTER  Order #:    [id number]                    Reading MD:    Measurements  Intervals                                Axis  Rate:       64                           P:          -8  AK:         228                          QRS:        -36  QRSD:       112                          T:          243  QT:         428  QTc:        442    Interpretive Statements  SINUS RHYTHM  FIRST DEGREE AV BLOCK  LVH WITH IVCD, LAD AND SECONDARY REPOL ABNRM  No previous ECG available for comparison     Urinalysis, culture if indicated    Collection Time: 17  6:51 PM   Result Value Ref Range    Color DK Yellow     Character Turbid (A)     Specific Gravity 1.024 <1.035    Ph 5.5 5.0 - 8.0    Glucose Negative Negative mg/dL    Ketones Trace (A) Negative mg/dL    Protein 100 (A) Negative mg/dL    Bilirubin Negative Negative    Urobilinogen, Urine 1.0 Negative    Nitrite Negative Negative    Leukocyte Esterase Large (A) Negative    Occult Blood Small (A) Negative    Micro Urine Req Microscopic     Culture Indicated Yes UA Culture   URINE MICROSCOPIC (W/UA)    Collection Time: 17  6:51 PM    Result Value Ref Range    WBC Packed WBC /hpf    RBC 5-10 (A) /hpf    Bacteria Few (A) None /hpf    Epithelial Cells Few /hpf    Amorphous Crystal Present /hpf       Imaging/Procedures Review:    ndependant Imaging Review: Completed  DX-PELVIS-1 OR 2 VIEWS   Final Result      1.  Internal fixation of RIGHT proximal femur.   2.  Diffuse osteopenia.   3.  No pelvic fracture.      DX-FEMUR-1 VIEW RIGHT   Final Result      1.  No fracture of the RIGHT femur.   2.  Prior internal fixation of proximal RIGHT femur.   3.  Moderate to severe degenerative change of RIGHT hip and knee.      DX-CHEST-LIMITED (1 VIEW)   Final Result      1.  There is aneurysmal dilatation of the thoracic aortic arch, probably chronic.   2.  Possible right mid hemithorax lung nodule.   3.  There are surgical clips in the right axilla.         EKG:   EKG Independant Review: Completed  QTc:442, HR: 64, Normal Sinus Rhythm, non specific ST/T changes with LVH.    Records reviewed and summarized in current documentation         Assessment/Plan     1)Mechanical ground level fall:  Patient presented with an episode of ground level fall mechanical with no prior symptoms.  Two episodes in this week.  Right hip pain and right shoulder pain after the fall.  X ray pelvis showed no pelvic fracture,  X Ray femur no fracture.  en route was given fentanyl which relieved the pian.  Likely secondary to dehydration and poor oral intake, increased confusion and physical deconditioning.  Plan:  inpatient admit to telmetry on cardiac monitoring.  Orthostatic vitals.  IV fluid therapy.  PT/OT consult.  Hold Hydrochlorothiazide and metoprolol.    2) Dehydration:  Patient presented with Poor oral intake and loss of apetitite.  Recent history of UTI.  Examination- Dry mucus membranes.  Plan:  Iv fluid therapy at 150 ml/hr  Monitor the vital signs.  Hold metoprolol and hydrochlorothiazide     3) Urinary tract infection:  Recent history UTI presented to the urgent care  with symptoms of dysuria ,hematuria and dark color urine.  Urinalysis showed positive leucocyte esterase .   Currently on Cefdinir started yesterday morning.  Continue home cefdinir 300 mg.  Ceftriaxone 1g given in the ED.    4) Anion gap metabolic acidosis:  On admission An ion gap is increased 13. Sodium and potassium is normal.  liekly secondary to dehydration and poor oral intake.  Monitor BMP to assess the gap.  IV fluids.    5)Aortic aneurysm:  Past history of aortic dissection with mesh placement  Chest x ray showed dilation of thoracic aortic arch aneurysm.  Probably Chronic given the patients history of aortic dissection.  Primary tea to consider Ct chest for aortic aneurysm.    6) Hypertension:  Past history of hypertension.  Hold home metoprolol and hydrochlorothiazide.   Continue valsartan.  Lipid panel, HbA1C and TSH is ordered.    Anticipated Hospital stay:  >2 midnights  Quality Measures    Reviewed items::  EKG reviewed, Labs reviewed, Medications reviewed and Radiology images reviewed  DVT prophylaxis pharmacological::  Enoxaparin (Lovenox)

## 2017-09-09 NOTE — CARE PLAN
Problem: Safety  Goal: Will remain free from injury  Outcome: PROGRESSING AS EXPECTED  Treaded socks on, bed locked & low, call light and belongings within reach, alarm on.       Problem: Knowledge Deficit  Goal: Knowledge of disease process/condition, treatment plan, diagnostic tests, and medications will improve  Outcome: PROGRESSING AS EXPECTED  POC discussed with patient at bedside.

## 2017-09-09 NOTE — ED NOTES
Med rec completed per pt family with pt's RX bottles at bedside  Bottles reviewed and returned to family  Allergies reviewed  Pt started a 7 day course of cefdinir on 9/6/17

## 2017-09-09 NOTE — ASSESSMENT & PLAN NOTE
[resolved]  Initially AG 13 w/ normal lytes,   Likely secondary to dehydration and poor oral intake.  AG now 7 following IVF

## 2017-09-09 NOTE — ED NOTES
BIB [facility] from home.  Reports [facility] GLF w/ R hip pain.  Pt reports walked to table w/ walker missed the chair hit chair arm and fell on R hip.  Pt able to  L leg and cross over L leg w/o pain. 1/10.  GCS 15,  Fentanyl 50 mcg en route. Pt sleepy.  126/66,  Hrs 67.  r 20, (%% on 2 L/n/c after fentanyl.    Pt has current UTI per daughter, daughter coming in.

## 2017-09-10 LAB
ALBUMIN SERPL BCP-MCNC: 2.9 G/DL (ref 3.2–4.9)
ALBUMIN/GLOB SERPL: 1.5 G/DL
ALP SERPL-CCNC: 69 U/L (ref 30–99)
ALT SERPL-CCNC: 8 U/L (ref 2–50)
ANION GAP SERPL CALC-SCNC: 7 MMOL/L (ref 0–11.9)
AST SERPL-CCNC: 13 U/L (ref 12–45)
BACTERIA UR CULT: NORMAL
BACTERIA UR CULT: NORMAL
BILIRUB SERPL-MCNC: 3 MG/DL (ref 0.1–1.5)
BUN SERPL-MCNC: 5 MG/DL (ref 8–22)
CALCIUM SERPL-MCNC: 8.2 MG/DL (ref 8.5–10.5)
CHLORIDE SERPL-SCNC: 113 MMOL/L (ref 96–112)
CO2 SERPL-SCNC: 19 MMOL/L (ref 20–33)
CREAT SERPL-MCNC: 0.32 MG/DL (ref 0.5–1.4)
EKG IMPRESSION: NORMAL
GFR SERPL CREATININE-BSD FRML MDRD: >60 ML/MIN/1.73 M 2
GLOBULIN SER CALC-MCNC: 1.9 G/DL (ref 1.9–3.5)
GLUCOSE SERPL-MCNC: 92 MG/DL (ref 65–99)
POTASSIUM SERPL-SCNC: 3.3 MMOL/L (ref 3.6–5.5)
PROT SERPL-MCNC: 4.8 G/DL (ref 6–8.2)
SIGNIFICANT IND 70042: NORMAL
SIGNIFICANT IND 70042: NORMAL
SITE SITE: NORMAL
SODIUM SERPL-SCNC: 139 MMOL/L (ref 135–145)
SOURCE SOURCE: NORMAL
SOURCE SOURCE: NORMAL

## 2017-09-10 PROCEDURE — 700105 HCHG RX REV CODE 258: Performed by: STUDENT IN AN ORGANIZED HEALTH CARE EDUCATION/TRAINING PROGRAM

## 2017-09-10 PROCEDURE — 80053 COMPREHEN METABOLIC PANEL: CPT

## 2017-09-10 PROCEDURE — A9270 NON-COVERED ITEM OR SERVICE: HCPCS | Performed by: STUDENT IN AN ORGANIZED HEALTH CARE EDUCATION/TRAINING PROGRAM

## 2017-09-10 PROCEDURE — 700111 HCHG RX REV CODE 636 W/ 250 OVERRIDE (IP): Performed by: INTERNAL MEDICINE

## 2017-09-10 PROCEDURE — 90471 IMMUNIZATION ADMIN: CPT

## 2017-09-10 PROCEDURE — 700111 HCHG RX REV CODE 636 W/ 250 OVERRIDE (IP): Performed by: STUDENT IN AN ORGANIZED HEALTH CARE EDUCATION/TRAINING PROGRAM

## 2017-09-10 PROCEDURE — 700102 HCHG RX REV CODE 250 W/ 637 OVERRIDE(OP): Performed by: STUDENT IN AN ORGANIZED HEALTH CARE EDUCATION/TRAINING PROGRAM

## 2017-09-10 PROCEDURE — 90662 IIV NO PRSV INCREASED AG IM: CPT | Performed by: INTERNAL MEDICINE

## 2017-09-10 PROCEDURE — 700105 HCHG RX REV CODE 258: Performed by: INTERNAL MEDICINE

## 2017-09-10 PROCEDURE — 99232 SBSQ HOSP IP/OBS MODERATE 35: CPT | Mod: GC | Performed by: INTERNAL MEDICINE

## 2017-09-10 PROCEDURE — 302146: Performed by: INTERNAL MEDICINE

## 2017-09-10 PROCEDURE — 770020 HCHG ROOM/CARE - TELE (206)

## 2017-09-10 PROCEDURE — 3E0234Z INTRODUCTION OF SERUM, TOXOID AND VACCINE INTO MUSCLE, PERCUTANEOUS APPROACH: ICD-10-PCS | Performed by: INTERNAL MEDICINE

## 2017-09-10 RX ORDER — POTASSIUM CHLORIDE 20 MEQ/1
40 TABLET, EXTENDED RELEASE ORAL ONCE
Status: COMPLETED | OUTPATIENT
Start: 2017-09-10 | End: 2017-09-10

## 2017-09-10 RX ORDER — ACETAMINOPHEN 325 MG/1
650 TABLET ORAL EVERY 4 HOURS PRN
Status: DISCONTINUED | OUTPATIENT
Start: 2017-09-10 | End: 2017-09-18 | Stop reason: HOSPADM

## 2017-09-10 RX ADMIN — ACETAMINOPHEN 650 MG: 325 TABLET, FILM COATED ORAL at 02:32

## 2017-09-10 RX ADMIN — POTASSIUM CHLORIDE 40 MEQ: 1500 TABLET, EXTENDED RELEASE ORAL at 06:24

## 2017-09-10 RX ADMIN — CEFDINIR 300 MG: 300 CAPSULE ORAL at 09:16

## 2017-09-10 RX ADMIN — ACETAMINOPHEN 650 MG: 325 TABLET, FILM COATED ORAL at 19:29

## 2017-09-10 RX ADMIN — CEFDINIR 300 MG: 300 CAPSULE ORAL at 19:29

## 2017-09-10 RX ADMIN — LEVOTHYROXINE SODIUM 100 MCG: 100 TABLET ORAL at 06:23

## 2017-09-10 RX ADMIN — VALSARTAN 160 MG: 80 TABLET ORAL at 09:16

## 2017-09-10 RX ADMIN — SODIUM CHLORIDE: 9 INJECTION, SOLUTION INTRAVENOUS at 02:37

## 2017-09-10 RX ADMIN — INFLUENZA A VIRUSA/MICHIGAN/45/2015 X-275 (H1N1) ANTIGEN (FORMALDEHYDE INACTIVATED), INFLUENZA A VIRUS A/HONG KONG/4801/2014 X-263B (H3N2) ANTIGEN (FORMALDEHYDE INACTIVATED), AND INFLUENZA B VIRUS B/BRISBANE/60/2008 ANTIGEN (FORMALDEHYDE INACTIVATED) 0.5 ML: 60; 60; 60 INJECTION, SUSPENSION INTRAMUSCULAR at 06:22

## 2017-09-10 RX ADMIN — ASPIRIN 81 MG: 81 TABLET, CHEWABLE ORAL at 19:29

## 2017-09-10 RX ADMIN — ENOXAPARIN SODIUM 40 MG: 100 INJECTION SUBCUTANEOUS at 09:15

## 2017-09-10 RX ADMIN — ACETAMINOPHEN 650 MG: 325 TABLET, FILM COATED ORAL at 13:02

## 2017-09-10 RX ADMIN — ACETAMINOPHEN 650 MG: 325 TABLET, FILM COATED ORAL at 06:23

## 2017-09-10 RX ADMIN — SODIUM CHLORIDE: 9 INJECTION, SOLUTION INTRAVENOUS at 15:09

## 2017-09-10 RX ADMIN — STANDARDIZED SENNA CONCENTRATE AND DOCUSATE SODIUM 2 TABLET: 8.6; 5 TABLET, FILM COATED ORAL at 19:29

## 2017-09-10 RX ADMIN — STANDARDIZED SENNA CONCENTRATE AND DOCUSATE SODIUM 2 TABLET: 8.6; 5 TABLET, FILM COATED ORAL at 09:16

## 2017-09-10 RX ADMIN — ATORVASTATIN CALCIUM 20 MG: 20 TABLET, FILM COATED ORAL at 19:29

## 2017-09-10 ASSESSMENT — ENCOUNTER SYMPTOMS
EYE PAIN: 0
SHORTNESS OF BREATH: 0
DOUBLE VISION: 0
FLANK PAIN: 0
VOMITING: 0
HEADACHES: 0
BLURRED VISION: 0
CHILLS: 0
CONSTIPATION: 1
DIARRHEA: 0
NAUSEA: 0
WEAKNESS: 1
SORE THROAT: 0
ABDOMINAL PAIN: 1
PALPITATIONS: 0
COUGH: 0
FEVER: 0

## 2017-09-10 ASSESSMENT — PAIN SCALES - GENERAL
PAINLEVEL_OUTOF10: 3
PAINLEVEL_OUTOF10: 3

## 2017-09-10 NOTE — CARE PLAN
Problem: Safety  Goal: Will remain free from falls  Outcome: PROGRESSING AS EXPECTED  Patient instructed to use the call bell for assistance, bed alarm activated, needs met    Problem: Urinary Elimination:  Goal: Ability to reestablish a normal urinary elimination pattern will improve  Outcome: PROGRESSING AS EXPECTED  Incontinent of urine X4, encouraged to call for urgency to urinate

## 2017-09-10 NOTE — PROGRESS NOTES
Patient c/o bilateral hip pain, worse when turning, MD notified, Tylenol administered as order with good effect

## 2017-09-10 NOTE — PROGRESS NOTES
2 RN Skin Check:    Redness on sacrum area, blanching. No other redness or breakdown found anywhere on body.

## 2017-09-10 NOTE — PROGRESS NOTES
Report received, pt care assumed.  Pt resting comfortably in bed, denies needs. Plan of care reviewed with patient, no further questions.  Bed in low position, call light in reach, bed alarm on, will continue to monitor.

## 2017-09-10 NOTE — PROGRESS NOTES
Internal Medicine Interval Note    Name Aurora Farias 10/19/1931   Age/Sex 85 y.o. female   MRN [id number]   Code Status DNR     After 5PM or if no immediate response to page, please call for cross-coverage  Attending/Team: Irena See Patient List for primary contact information  Call [phone] to page    1st Call - Day Intern (R1):   Dr. Dalal 2nd Call - Day Sr. Resident (R2/R3):   Dr. Beyer         Reason for interval visit  (Principal Problem)   Fall from ground level    Interval Problem Daily Status Update  (24 hours)   Pt seen this morning at bedside, no significant change from yesterday; continues to be relatively confused and tired.   Pt incontinent of urine o/n x5. Fluids decreased today to 83ml/hr.  No current complaints. No abdominal TTP today.  No BM yet, pt is unware if she has had one or not. Bowel protocol in place and nursing aware.  Awaiting PT/OT to evaluate for possible SNF      Review of Systems   Constitutional: Negative for chills and fever.   HENT: Negative for congestion and sore throat.    Eyes: Negative for blurred vision, double vision and pain.   Respiratory: Negative for cough and shortness of breath.    Cardiovascular: Negative for chest pain, palpitations and leg swelling.   Gastrointestinal: Positive for abdominal pain and constipation. Negative for diarrhea, nausea and vomiting.   Genitourinary: Negative for dysuria and flank pain.   Skin: Negative.    Neurological: Positive for weakness. Negative for headaches.       Consultants/Specialty  PT/OT    Disposition  Inpatient.    Quality Measures    Reviewed items::  EKG reviewed, Labs reviewed, Medications reviewed and Radiology images reviewed  Nance catheter::  No Nance  DVT prophylaxis pharmacological::  Enoxaparin (Lovenox)          Physical Exam       Vitals:    09/09/17 2000 09/10/17 0000 09/10/17 0400 09/10/17 0800   BP: 150/53 139/46 155/59 138/53   Pulse: 72 65 62 (!) 57   Resp: (!) 22 20 20 18    Temp: 36.6 °C (97.9 °F) 36.3 °C (97.3 °F) 36.1 °C (97 °F) 36.4 °C (97.6 °F)   SpO2: 91% 94% 92% 97%   Weight: 58.9 kg (129 lb 13.6 oz)      Height:         Body mass index is 23 kg/m². Weight: 58.9 kg (129 lb 13.6 oz)  Oxygen Therapy:  Pulse Oximetry: 97 %, O2 (LPM): 0, O2 Delivery: None (Room Air)    Physical Exam   Constitutional:   Frail, thin. No acute distress.   HENT:   Head: Normocephalic and atraumatic.   Mouth/Throat: Mucous membranes are not dry.   Neck: No JVD present. No tracheal deviation present.   Cardiovascular: Normal rate and regular rhythm.    Murmur (systolic 2/6) heard.  Pulmonary/Chest: Effort normal and breath sounds normal. She has no wheezes. She has no rales.   Abdominal: Soft. There is tenderness (epigastrium).   Lymphadenopathy:     She has cervical adenopathy.   Neurological: She is alert. No cranial nerve deficit. GCS score is 15.   Oriented to self, person, location, situation. Not to date or month.   Skin: Skin is warm and dry.         Lab Data Review:         9/9/2017  1:08 PM    Recent Labs      09/08/17   1821  09/10/17   0400   SODIUM  137  139   POTASSIUM  3.8  3.3*   CHLORIDE  104  113*   CO2  20  19*   BUN  13  5*   CREATININE  0.58  0.32*   CALCIUM  9.5  8.2*       Recent Labs      09/08/17   1821  09/10/17   0400   ALTSGPT  11  8   ASTSGOT  21  13   ALKPHOSPHAT  82  69   TBILIRUBIN  3.9*  3.0*   GLUCOSE  101*  92       Recent Labs      09/08/17 1821   RBC  5.01   HEMOGLOBIN  14.7   HEMATOCRIT  43.2   PLATELETCT  205   PROTHROMBTM  15.8*   APTT  28.6   INR  1.22*       Recent Labs      09/08/17   1821  09/10/17   0400   WBC  9.2   --    NEUTSPOLYS  68.40   --    LYMPHOCYTES  20.00*   --    MONOCYTES  8.60   --    EOSINOPHILS  1.50   --    BASOPHILS  0.30   --    ASTSGOT  21  13   ALTSGPT  11  8   ALKPHOSPHAT  82  69   TBILIRUBIN  3.9*  3.0*           Assessment/Plan     * Fall from ground level- (present on admission)   Assessment & Plan    Patient presented with GLF;  mechanical trying to sit in chair, with no prior dizziness/palpitation/syncope/seizure.  Increased falls/unsteady gait over past few months. Of note, CT-head from July at [facility] showed incidental finding 1.7cm mass likely meningioma.  Orthostatics negative.  Right hip pain and right shoulder pain after the fall.  X ray pelvis showed no pelvic fracture,  X Ray femur no fracture.  Plan:  CT head w/o shows no change in meningioma from July.  Daughter reports increased frequency of falls and would like to look into SNF placement.  Awaiting PT/OT consult to evaluate for possible SNF        anion gap metabolic acidosis- (present on admission)   Assessment & Plan    [resolved]  Initially AG 13 w/ normal lytes,   Likely secondary to dehydration and poor oral intake.  AG now 7 following IVF          Aortic aneurysm (CMS-HCC)   Assessment & Plan    Past history of aortic dissection with mesh placement  Chest x ray showed dilation of thoracic aortic arch aneurysm.  Likely chronic given the patients history of aortic dissection.          Dehydration- (present on admission)   Assessment & Plan    Patient presented with Poor oral intake and loss of apetitite.  Initially clinically dry, looks ok now.  Dehydration not likely the direct cause of her fall as there was no dizzyness/lightheadedness reported  Plan:  IVF decreased to 83ml/hr  Currently holding metoprolol and HCTZ          Hypertension- (present on admission)   Assessment & Plan    Past history of hypertension.  Hold home metoprolol and hydrochlorothiazide.   Continue valsartan.          Urinary tract infection- (present on admission)   Assessment & Plan    Recent history UTI presented to the urgent care with symptoms of dysuria ,hematuria and dark color urine.  Urinalysis showed positive leucocyte esterase. Culture not done at urgent care, culture drawn here is pending.  Currently on Cefdinir started 9/8.  Continue home cefdinir 300 mg.

## 2017-09-11 ENCOUNTER — APPOINTMENT (OUTPATIENT)
Dept: RADIOLOGY | Facility: MEDICAL CENTER | Age: 82
DRG: 555 | End: 2017-09-11
Attending: STUDENT IN AN ORGANIZED HEALTH CARE EDUCATION/TRAINING PROGRAM
Payer: COMMERCIAL

## 2017-09-11 PROBLEM — I48.0 PAROXYSMAL A-FIB (HCC): Status: ACTIVE | Noted: 2017-09-11

## 2017-09-11 PROBLEM — I48.91 ATRIAL FIBRILLATION (HCC): Status: ACTIVE | Noted: 2017-09-11

## 2017-09-11 PROBLEM — I63.9 CVA (CEREBRAL VASCULAR ACCIDENT) (HCC): Status: ACTIVE | Noted: 2017-09-11

## 2017-09-11 LAB
ALBUMIN SERPL BCP-MCNC: 3.4 G/DL (ref 3.2–4.9)
ALBUMIN/GLOB SERPL: 1.4 G/DL
ALP SERPL-CCNC: 82 U/L (ref 30–99)
ALT SERPL-CCNC: 10 U/L (ref 2–50)
ANION GAP SERPL CALC-SCNC: 13 MMOL/L (ref 0–11.9)
APTT PPP: 33.8 SEC (ref 24.7–36)
APTT PPP: 67.9 SEC (ref 24.7–36)
APTT PPP: 97.6 SEC (ref 24.7–36)
AST SERPL-CCNC: 16 U/L (ref 12–45)
BASOPHILS # BLD AUTO: 0.6 % (ref 0–1.8)
BASOPHILS # BLD: 0.06 K/UL (ref 0–0.12)
BILIRUB SERPL-MCNC: 4.1 MG/DL (ref 0.1–1.5)
BUN SERPL-MCNC: 4 MG/DL (ref 8–22)
CALCIUM SERPL-MCNC: 9 MG/DL (ref 8.5–10.5)
CHLORIDE SERPL-SCNC: 108 MMOL/L (ref 96–112)
CHOLEST SERPL-MCNC: 80 MG/DL (ref 100–199)
CO2 SERPL-SCNC: 15 MMOL/L (ref 20–33)
CREAT SERPL-MCNC: 0.41 MG/DL (ref 0.5–1.4)
EKG IMPRESSION: NORMAL
EOSINOPHIL # BLD AUTO: 0.24 K/UL (ref 0–0.51)
EOSINOPHIL NFR BLD: 2.3 % (ref 0–6.9)
ERYTHROCYTE [DISTWIDTH] IN BLOOD BY AUTOMATED COUNT: 45.9 FL (ref 35.9–50)
GFR SERPL CREATININE-BSD FRML MDRD: >60 ML/MIN/1.73 M 2
GLOBULIN SER CALC-MCNC: 2.4 G/DL (ref 1.9–3.5)
GLUCOSE BLD-MCNC: 113 MG/DL (ref 65–99)
GLUCOSE SERPL-MCNC: 130 MG/DL (ref 65–99)
HCT VFR BLD AUTO: 41.5 % (ref 37–47)
HDLC SERPL-MCNC: 38 MG/DL
HGB BLD-MCNC: 14.2 G/DL (ref 12–16)
IMM GRANULOCYTES # BLD AUTO: 0.04 K/UL (ref 0–0.11)
IMM GRANULOCYTES NFR BLD AUTO: 0.4 % (ref 0–0.9)
LACTATE BLD-SCNC: 1.4 MMOL/L (ref 0.5–2)
LACTATE BLD-SCNC: 2.7 MMOL/L (ref 0.5–2)
LDLC SERPL CALC-MCNC: 29 MG/DL
LV EJECT FRACT  99904: 40
LV EJECT FRACT MOD 2C 99903: 50.72
LV EJECT FRACT MOD 4C 99902: 46.21
LV EJECT FRACT MOD BP 99901: 46.63
LYMPHOCYTES # BLD AUTO: 2.45 K/UL (ref 1–4.8)
LYMPHOCYTES NFR BLD: 23.7 % (ref 22–41)
MAGNESIUM SERPL-MCNC: 1.7 MG/DL (ref 1.5–2.5)
MCH RBC QN AUTO: 28.9 PG (ref 27–33)
MCHC RBC AUTO-ENTMCNC: 34.2 G/DL (ref 33.6–35)
MCV RBC AUTO: 84.5 FL (ref 81.4–97.8)
MONOCYTES # BLD AUTO: 0.72 K/UL (ref 0–0.85)
MONOCYTES NFR BLD AUTO: 7 % (ref 0–13.4)
NEUTROPHILS # BLD AUTO: 6.81 K/UL (ref 2–7.15)
NEUTROPHILS NFR BLD: 66 % (ref 44–72)
NRBC # BLD AUTO: 0 K/UL
NRBC BLD AUTO-RTO: 0 /100 WBC
PLATELET # BLD AUTO: 186 K/UL (ref 164–446)
PMV BLD AUTO: 10.2 FL (ref 9–12.9)
POTASSIUM SERPL-SCNC: 3.6 MMOL/L (ref 3.6–5.5)
PROT SERPL-MCNC: 5.8 G/DL (ref 6–8.2)
RBC # BLD AUTO: 4.91 M/UL (ref 4.2–5.4)
SODIUM SERPL-SCNC: 136 MMOL/L (ref 135–145)
TRIGL SERPL-MCNC: 66 MG/DL (ref 0–149)
TROPONIN I SERPL-MCNC: 0.13 NG/ML (ref 0–0.04)
TROPONIN I SERPL-MCNC: 1.57 NG/ML (ref 0–0.04)
TROPONIN I SERPL-MCNC: 1.61 NG/ML (ref 0–0.04)
TSH SERPL DL<=0.005 MIU/L-ACNC: 0.88 UIU/ML (ref 0.3–3.7)
WBC # BLD AUTO: 10.3 K/UL (ref 4.8–10.8)

## 2017-09-11 PROCEDURE — A9579 GAD-BASE MR CONTRAST NOS,1ML: HCPCS | Performed by: STUDENT IN AN ORGANIZED HEALTH CARE EDUCATION/TRAINING PROGRAM

## 2017-09-11 PROCEDURE — 83605 ASSAY OF LACTIC ACID: CPT

## 2017-09-11 PROCEDURE — G8997 SWALLOW GOAL STATUS: HCPCS | Mod: CK

## 2017-09-11 PROCEDURE — 85025 COMPLETE CBC W/AUTO DIFF WBC: CPT

## 2017-09-11 PROCEDURE — 80053 COMPREHEN METABOLIC PANEL: CPT

## 2017-09-11 PROCEDURE — 700111 HCHG RX REV CODE 636 W/ 250 OVERRIDE (IP): Performed by: STUDENT IN AN ORGANIZED HEALTH CARE EDUCATION/TRAINING PROGRAM

## 2017-09-11 PROCEDURE — A9270 NON-COVERED ITEM OR SERVICE: HCPCS | Performed by: STUDENT IN AN ORGANIZED HEALTH CARE EDUCATION/TRAINING PROGRAM

## 2017-09-11 PROCEDURE — 700102 HCHG RX REV CODE 250 W/ 637 OVERRIDE(OP): Performed by: INTERNAL MEDICINE

## 2017-09-11 PROCEDURE — 70450 CT HEAD/BRAIN W/O DYE: CPT

## 2017-09-11 PROCEDURE — 700105 HCHG RX REV CODE 258: Performed by: INTERNAL MEDICINE

## 2017-09-11 PROCEDURE — G8996 SWALLOW CURRENT STATUS: HCPCS | Mod: CL

## 2017-09-11 PROCEDURE — 84443 ASSAY THYROID STIM HORMONE: CPT

## 2017-09-11 PROCEDURE — 92610 EVALUATE SWALLOWING FUNCTION: CPT

## 2017-09-11 PROCEDURE — 700117 HCHG RX CONTRAST REV CODE 255: Performed by: STUDENT IN AN ORGANIZED HEALTH CARE EDUCATION/TRAINING PROGRAM

## 2017-09-11 PROCEDURE — A9270 NON-COVERED ITEM OR SERVICE: HCPCS | Performed by: INTERNAL MEDICINE

## 2017-09-11 PROCEDURE — 700102 HCHG RX REV CODE 250 W/ 637 OVERRIDE(OP): Performed by: STUDENT IN AN ORGANIZED HEALTH CARE EDUCATION/TRAINING PROGRAM

## 2017-09-11 PROCEDURE — 700111 HCHG RX REV CODE 636 W/ 250 OVERRIDE (IP): Performed by: INTERNAL MEDICINE

## 2017-09-11 PROCEDURE — 70496 CT ANGIOGRAPHY HEAD: CPT

## 2017-09-11 PROCEDURE — 85730 THROMBOPLASTIN TIME PARTIAL: CPT

## 2017-09-11 PROCEDURE — 82962 GLUCOSE BLOOD TEST: CPT

## 2017-09-11 PROCEDURE — 93005 ELECTROCARDIOGRAM TRACING: CPT | Performed by: STUDENT IN AN ORGANIZED HEALTH CARE EDUCATION/TRAINING PROGRAM

## 2017-09-11 PROCEDURE — 93306 TTE W/DOPPLER COMPLETE: CPT

## 2017-09-11 PROCEDURE — 71010 DX-CHEST-PORTABLE (1 VIEW): CPT

## 2017-09-11 PROCEDURE — 36415 COLL VENOUS BLD VENIPUNCTURE: CPT

## 2017-09-11 PROCEDURE — 93010 ELECTROCARDIOGRAM REPORT: CPT | Performed by: INTERNAL MEDICINE

## 2017-09-11 PROCEDURE — 770001 HCHG ROOM/CARE - MED/SURG/GYN PRIV*

## 2017-09-11 PROCEDURE — 93306 TTE W/DOPPLER COMPLETE: CPT | Mod: 26 | Performed by: INTERNAL MEDICINE

## 2017-09-11 PROCEDURE — 84484 ASSAY OF TROPONIN QUANT: CPT

## 2017-09-11 PROCEDURE — 83735 ASSAY OF MAGNESIUM: CPT

## 2017-09-11 PROCEDURE — 700117 HCHG RX CONTRAST REV CODE 255: Performed by: INTERNAL MEDICINE

## 2017-09-11 PROCEDURE — 70498 CT ANGIOGRAPHY NECK: CPT

## 2017-09-11 PROCEDURE — 80061 LIPID PANEL: CPT

## 2017-09-11 PROCEDURE — 700111 HCHG RX REV CODE 636 W/ 250 OVERRIDE (IP): Performed by: PHARMACIST

## 2017-09-11 PROCEDURE — 70553 MRI BRAIN STEM W/O & W/DYE: CPT

## 2017-09-11 RX ORDER — ASPIRIN 325 MG
325 TABLET ORAL DAILY
Status: DISCONTINUED | OUTPATIENT
Start: 2017-09-11 | End: 2017-09-16

## 2017-09-11 RX ORDER — POTASSIUM CHLORIDE 7.45 MG/ML
10 INJECTION INTRAVENOUS
Status: COMPLETED | OUTPATIENT
Start: 2017-09-11 | End: 2017-09-11

## 2017-09-11 RX ORDER — LORAZEPAM 2 MG/ML
INJECTION INTRAMUSCULAR
Status: DISPENSED
Start: 2017-09-11 | End: 2017-09-11

## 2017-09-11 RX ORDER — MAGNESIUM SULFATE HEPTAHYDRATE 40 MG/ML
4 INJECTION, SOLUTION INTRAVENOUS ONCE
Status: COMPLETED | OUTPATIENT
Start: 2017-09-11 | End: 2017-09-11

## 2017-09-11 RX ORDER — METOPROLOL SUCCINATE 25 MG/1
12.5 TABLET, EXTENDED RELEASE ORAL
Status: DISCONTINUED | OUTPATIENT
Start: 2017-09-11 | End: 2017-09-18 | Stop reason: HOSPADM

## 2017-09-11 RX ORDER — ATORVASTATIN CALCIUM 80 MG/1
80 TABLET, FILM COATED ORAL
Status: DISCONTINUED | OUTPATIENT
Start: 2017-09-11 | End: 2017-09-18 | Stop reason: HOSPADM

## 2017-09-11 RX ORDER — ASPIRIN 300 MG/1
300 SUPPOSITORY RECTAL DAILY
Status: DISCONTINUED | OUTPATIENT
Start: 2017-09-11 | End: 2017-09-16

## 2017-09-11 RX ORDER — HYDRALAZINE HYDROCHLORIDE 20 MG/ML
10 INJECTION INTRAMUSCULAR; INTRAVENOUS ONCE
Status: COMPLETED | OUTPATIENT
Start: 2017-09-11 | End: 2017-09-11

## 2017-09-11 RX ORDER — LORAZEPAM 2 MG/ML
0.5 INJECTION INTRAMUSCULAR
Status: DISCONTINUED | OUTPATIENT
Start: 2017-09-11 | End: 2017-09-11

## 2017-09-11 RX ORDER — VALSARTAN 80 MG/1
160 TABLET ORAL
Status: DISCONTINUED | OUTPATIENT
Start: 2017-09-12 | End: 2017-09-18 | Stop reason: HOSPADM

## 2017-09-11 RX ADMIN — METOPROLOL SUCCINATE 12.5 MG: 25 TABLET, EXTENDED RELEASE ORAL at 20:43

## 2017-09-11 RX ADMIN — CEFTRIAXONE SODIUM 1 G: 1 INJECTION, POWDER, FOR SOLUTION INTRAMUSCULAR; INTRAVENOUS at 09:04

## 2017-09-11 RX ADMIN — POTASSIUM CHLORIDE 10 MEQ: 7.46 INJECTION, SOLUTION INTRAVENOUS at 04:19

## 2017-09-11 RX ADMIN — ASPIRIN 300 MG: 300 SUPPOSITORY RECTAL at 04:20

## 2017-09-11 RX ADMIN — HYDRALAZINE HYDROCHLORIDE 10 MG: 20 INJECTION INTRAMUSCULAR; INTRAVENOUS at 00:54

## 2017-09-11 RX ADMIN — HEPARIN SODIUM 900 UNITS/HR: 5000 INJECTION, SOLUTION INTRAVENOUS at 05:03

## 2017-09-11 RX ADMIN — IOHEXOL 75 ML: 350 INJECTION, SOLUTION INTRAVENOUS at 02:53

## 2017-09-11 RX ADMIN — POTASSIUM CHLORIDE 10 MEQ: 7.46 INJECTION, SOLUTION INTRAVENOUS at 07:40

## 2017-09-11 RX ADMIN — GADODIAMIDE 15 ML: 287 INJECTION INTRAVENOUS at 10:25

## 2017-09-11 RX ADMIN — SODIUM CHLORIDE: 9 INJECTION, SOLUTION INTRAVENOUS at 04:15

## 2017-09-11 RX ADMIN — POTASSIUM CHLORIDE 10 MEQ: 7.46 INJECTION, SOLUTION INTRAVENOUS at 06:03

## 2017-09-11 RX ADMIN — MAGNESIUM SULFATE IN WATER 4 G: 40 INJECTION, SOLUTION INTRAVENOUS at 06:03

## 2017-09-11 RX ADMIN — POTASSIUM CHLORIDE 10 MEQ: 7.46 INJECTION, SOLUTION INTRAVENOUS at 09:01

## 2017-09-11 ASSESSMENT — ENCOUNTER SYMPTOMS
WHEEZING: 0
NAUSEA: 0
ABDOMINAL PAIN: 0
COUGH: 0
DOUBLE VISION: 0
BLURRED VISION: 0
DEPRESSION: 0
FEVER: 0
CHILLS: 0
CLAUDICATION: 0
PALPITATIONS: 0
DIZZINESS: 0
WEAKNESS: 1
VOMITING: 0
MYALGIAS: 0
SHORTNESS OF BREATH: 0
TINGLING: 0
HEADACHES: 0

## 2017-09-11 ASSESSMENT — PAIN SCALES - GENERAL
PAINLEVEL_OUTOF10: 0

## 2017-09-11 NOTE — PROGRESS NOTES
Paused Heparin at 1000 for MRI, restarted just after MRI at 1030, spoke with Pharmacy about PTT level and titration of gtt due to this intervention, plan to follow protocol as ordered

## 2017-09-11 NOTE — ASSESSMENT & PLAN NOTE
RRT called on 09/11 after acute onset slurred speech and right sided weakness. Found to have new onset A. Fib at time of RRT. CT head negative, CTA head/neck negative for acute thrombus/dissection, previously fixed aortic dissection present. MRI negative for acute infarct.  As per neuro, likely TIA secondary to embolic event from afib. No residual on MRI.   - continue ASA 81, statin  - Palliative care for advanced care planning  - Patient will not proceed with long term AC

## 2017-09-11 NOTE — CONSULTS
Reason for PC Consult: Advance Care Planning    Consulted by: Dr. Retana    Assessment:  General:   85 yr female admitted after a GLF while attempting to sit in a chair.  Past medical history of HTN, Hypothyroidism, AAA dissection with mesh repair 1999, fractured right hip.  Patient currently is awake, alert and oriented, engaging in conversation.      Dyspnea: No-    Last BM: 09/11/17- Per RN assessment  Pain: No-    Depression: Mood appropriate for situation-      Spiritual:  Is Church or spirituality important for coping with this illness? No-    Has a  or spiritual provider visit been requested? No    Palliative Performance Scale: 10%    Advance Directive: Advance Directive, DPOA- Treva Boo, Daughter CARMEN  DPOA: Yes- Treva Boo Daughter CARMEN  ([phone])  POLST: Yes-      Code Status: DNR-      Outcome:  I met with the patient and her daughter at bedside.  Introduced myself and the roll of Palliative Care.  Daughter discussed that she had been taking care of her mother for the last few year post her hip fracture.  The patient began to have increased number of falls and she became unsafe to continue to live alone. To give the daughter, Treva, a break she has spent the last 3 months with the patient's son, Arpit, where she also continued to fall.  The patient returned to her daughter's house last week and had already fallen twice prior to being brought to the hospital.    Treva states that she is unable to be the patient's only caretaker and is requesting assistance to find placement.  We discussed a discharge to rehab for strengthening, if the patient is capable of meeting criteria and willing vs a Hospice discharge to a group home, facility or the daughter's home (within reason).  We reviewed the patient's advance directive, wishes, code status and how to most honor her mother.      Treva stated understanding and would like to look into group homes.  Advance Directive and (new [place]) POLST  collected, copied and scanned into Epic.    Updated: Ty Hill, RN and Palliative Care team    Plan: Palliative Care will follow patient's progress through her stay.  Continue to provide education and support for Treva, daughter.      Thank you for allowing Palliative Care to participate in this patient's care. Please feel free to call [phone] with any questions or concerns.

## 2017-09-11 NOTE — PROGRESS NOTES
84 yo female admitted with falls and dehydration who was receiving treatment for her urinary tract infection with cefdinir and was awaiting placement to a SNF. A rapid response was called at 1:35 am for chest pain. Evaluated at bedside, patient had an acute change in her mental status. As per chart review, patient had been noted to have gradually worsening mental status in the last few months. However, as per RN patient was able to hold a conversation.     Of note, is that during this admission, patient was noted to have a meningioma as well.     Code stroke called around 1:45 am.     Plan:  -CT head, CTA neck  -EKG, troponins  -CBC, CMP, mag and phos  -hold bp medications in the setting of possible stroke to allow for permissive htn.   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   -Neurologist on call to be contacted after ct results as per stroke protocol.  -Cardiology consult after ct results given st depression on ekg and elevated troponins. Discussed with Dr. Ventura  -Daughter updated. No surgical intervention and patient is to be a DNR/DNI.   -transfer to the icu for closer monitoring and higher level of care. Consider q2 neuro checks.

## 2017-09-11 NOTE — PROGRESS NOTES
Two RN head to toe skin assessment completed.  The following areas of concerns are noted: Blanchable redness forehead; bruising on R elbow and R knee, small bruises bilateral forearms.   Appropriate interventions in place.

## 2017-09-11 NOTE — PROGRESS NOTES
Pulmonary Critical Care Progress Note      Date of Service: 9/11/2017    Chief Complaint: UTI    History of Present Illness: This is an 85-year-old lady who was admitted to [facility]   on or about 09/08/2017.  She was admitted with urinary tract infection and   mechanical ground level fall.  She had right hip pain.  Imaging of her pelvis   and right hip did not reveal an acute fracture.  She had previously been   evaluated in urgent care and was treated for urinary tract infection.  She was   continued on antibiotics for her urinary tract infection during her   hospitalization.  This evening, nursing noticed that she was having difficulty   with her speech and may have had some left-sided weakness.  A code stroke was   called as well as a rapid response.  CT scan of the head shows no acute   pathology.  She has evidence of a meningioma measuring 18 mm in the midline   within the frontal region at the level of the falx.  CT angiography of the   neck showed a type B dissection with evidence of repair.  There was no   significant stenosis in the carotid arteries.  CT angiography of the head   showed no thrombus or aneurysm.  She was transferred to the intensive care   unit.  A stat electrocardiogram revealed new atrial fibrillation with a heart   rate of 103.    ROS:  Respiratory: unable to perform due to the patient's inability to effectively communicate, Cardiac: unable to perform due to the patient's inability to effectively communicate, GI: unable to perform due to the patient's inability to effectively communicate.  All other systems negative.    Interval Events:  24 hour interval history reviewed  Tm 97.6  -125cc over last 24hr, +1.1L since admit  Cxr, per my read, prominent and calcified aortic arch, mild interstitial changes  Transferred to ICU for acute neuro changes and afib rvr  Back in sinus  Per family neuro changes all resolved and pt back to baseline    Exam  Blood pressure 158/70,  "pulse 82, temperature 36.1 °C (97 °F), resp. rate (!) 31, height 1.6 m (5' 3\"), weight 62.3 kg (137 lb 5.6 oz), SpO2 100 %, not currently breastfeeding.  Gen: nad, generally weak appearing, slow to respond  Heent: nc, pupils equal and reactive  Cvs: rrr  Resp: diminished  Abdo: s, nd, nd  Ext: no edema  Neuro: follows, equal strength in all ext, very slow to respond, CN 2-12 appear grossly intact      PFSH:  No change.    Respiratory:     Pulse Oximetry: 100 %  Chest Tube Drains:            ImagingAvailable data reviewed         Invalid input(s): [id number]    HemoDynamics:  Pulse: 82, Heart Rate (Monitored): 82  Blood Pressure : 158/70, NIBP: (!) 181/75         Imaging: Available data reviewed  Recent Labs      09/11/17   0148   TROPONINI  0.13*       Neuro:  GCS Total Foster Coma Score: 14         Imaging: Available data reviewed    Fluids:  Intake/Output       09/09/17 0700 - 09/10/17 0659 09/10/17 0700 - 09/11/17 0659 09/11/17 0700 - 09/12/17 0659      3960-4627 0575-0283 Total 6776-6615 1695-0547 Total 1323-3333 4190-5821 Total       Intake    P.O.  180  -- 180  --  -- --  --  -- --    P.O. 180 -- 180 -- -- -- -- -- --    I.V.  --  1500 1500  --  549.1 549.1  --  -- --    Magnesium Sulfate Volume -- -- -- -- 0 0 -- -- --    Heparin Volume -- -- -- -- 17.1 17.1 -- -- --    IV Piggyback Volume (Potassium Chloride) -- -- -- -- 200 200 -- -- --    IV Volume -- 1500 1500 -- 332 332 -- -- --    Total Intake 180 1500 1680 -- 549.1 549.1 -- -- --       Output    Urine  --  -- --  --  675 675  --  -- --    Number of Times Incontinent of Urine 4 x 5 x 9 x 3 x 2 x 5 x -- -- --    Indwelling Cathether -- -- -- -- 384 921 -- -- --    Stool  --  -- --  --  -- --  --  -- --    Number of Times Stooled -- -- -- 1 x 2 x 3 x -- -- --    Total Output -- -- -- -- 375 775 -- -- --       Net I/O     180 1500 1680 -- -125.9 -125.9 -- -- --        Weight: 62.3 kg (137 lb 5.6 oz)  Recent Labs      09/08/17   1821  09/10/17   0400  " 17   SODIUM  137  139  136   POTASSIUM  3.8  3.3*  3.6   CHLORIDE  104  113*  108   CO2  20  19*  15*   BUN  13  5*  4*   CREATININE  0.58  0.32*  0.41*   MAGNESIUM   --    --   1.7   CALCIUM  9.5  8.2*  9.0       GI/Nutrition:    Imaging: Available data reviewed  NPO  Liver Function  Recent Labs      09/08/17   1821  09/10/17   0400  17   ALTSGPT  11  8  10   ASTSGOT  21  13  16   ALKPHOSPHAT  82  69  82   TBILIRUBIN  3.9*  3.0*  4.1*   GLUCOSE  101*  92  130*       Heme:  Recent Labs      17   RBC  5.01   --   4.91   HEMOGLOBIN  14.7   --   14.2   HEMATOCRIT  43.2   --   41.5   PLATELETCT  205   --   186   PROTHROMBTM  15.8*   --    --    APTT  28.6  33.8   --    INR  1.22*   --    --        Infectious Disease:  Monitored Temp  Av.2 °C (97.1 °F)  Min: 35.7 °C (96.3 °F)  Max: 36.4 °C (97.5 °F)  Temp  Av.3 °C (97.3 °F)  Min: 36.1 °C (97 °F)  Max: 36.4 °C (97.6 °F)  Micro: cultures reviewed  Recent Labs      09/08/17   1821  09/10/17   04017   WBC  9.2   --    --   10.3   NEUTSPOLYS  68.40   --    --   66.00   LYMPHOCYTES  20.00*   --    --   23.70   MONOCYTES  8.60   --    --   7.00   EOSINOPHILS  1.50   --    --   2.30   BASOPHILS  0.30   --    --   0.60   ASTSGOT  21  13  16   --    ALTSGPT  11  8  10   --    ALKPHOSPHAT  82  69  82   --    TBILIRUBIN  3.9*  3.0*  4.1*   --      Current Facility-Administered Medications   Medication Dose Frequency Provider Last Rate Last Dose   • atorvastatin (LIPITOR) tablet 80 mg  80 mg QHS Delores Hooker M.D.   Stopped at 17 0215   • LORAZEPAM 2 MG/ML INJ SOLN           • magnesium sulfate IVPB premix 4 g  4 g Once Arturo Durant M.D. 25 mL/hr at 17 0603 4 g at 17 0603   • potassium chloride in water (KCL) ivpb 10 mEq  10 mEq Q HOUR Arturo Durant M.D. 0 mL/hr at 17 0659 10 mEq at 17 0740   • cefTRIAXone (ROCEPHIN)  1 g in  mL IVPB  1 g Q24HRS Arturo Durant M.D.       • aspirin (ASA) tablet 325 mg  325 mg DAILY Arturo Durant M.D.        Or   • aspirin (ASA) suppository 300 mg  300 mg DAILY Arturo Durant M.D.   300 mg at 09/11/17 0420   • MD ALERT...HEPARIN WEIGHT BASED PROTOCOL Pharmacist to implement   PRN Arturo Durant M.D.       • Pharmacy Consult Request   PRN Arturo Durant M.D.       • heparin infusion 25,000 units in 500 ml 0.45% nacl   Continuous Katherine Youngblood PharmD 18 mL/hr at 09/11/17 0503 900 Units/hr at 09/11/17 0503   • [START ON 9/12/2017] valsartan (DIOVAN) tablet 160 mg  160 mg Q DAY Raquel Olmedo, M.B.B.S.       • acetaminophen (TYLENOL) tablet 650 mg  650 mg Q4HRS PRN Rolanda Garcia M.D.   650 mg at 09/10/17 1929   • NS infusion   Continuous Woodrow Ware M.D. 83 mL/hr at 09/11/17 0415     • senna-docusate (PERICOLACE or SENOKOT S) 8.6-50 MG per tablet 2 Tab  2 Tab BID Raquel Olmedo M.B.B.S.   2 Tab at 09/10/17 1929    And   • polyethylene glycol/lytes (MIRALAX) PACKET 1 Packet  1 Packet QDAY PRN Raquel Olmedo, M.B.B.S.        And   • magnesium hydroxide (MILK OF MAGNESIA) suspension 30 mL  30 mL QDAY PRN Raquel Olmedo, M.B.B.S.        And   • bisacodyl (DULCOLAX) suppository 10 mg  10 mg QDAY PRN Raquel Olmedo M.B.B.S.       • Respiratory Care per Protocol   Continuous RT Raquel Olmedo, M.B.B.S.       • levothyroxine (SYNTHROID) tablet 100 mcg  100 mcg AM ES Raquel Olmedo M.B.B.S.   Stopped at 09/11/17 0700   • pneumococcal 13-Miriam Conj Vacc (PREVNAR 13) syringe 0.5 mL  0.5 mL Once PRN Woodrow Ware M.D.         Last reviewed on 9/8/2017 11:14 PM by Marcial Ramsey R.N.    Quality  Measures:  Medications reviewed, Labs reviewed and Radiology images reviewed                  Lines  Piv    Gtt  Hep gtt  Ns 83    Abx  Ceftriaxone    Ucx 9/7 and 9/8 ngtd    Assessment/Plan:  - ALOC w transient focal deficits   - ? Tia vs hypoperfusion with transient  a fib    - now back to baseline, in sinus rhythm   - difficult neuro assessment given slowness to respond and generalized deconditioning   - ct and mri scans all unrevealing  - PAF   - currently sinus   - continue tele monitoring   - was started on hep gtt earlier given concern of tia/cva  - Hypomagnesemia   - replacing  - Hypokalemia    - replacing  - Elevated Lactate   - checking serial lactates   - ? Related to a fib w rvr and decreased cardiac op  - Elevated troponin   - marginal, ? Related to a fib/rvr   - trend, presently on hep gtt due to concern of cva  - 18mm meningioma   - Rt hip pain post glf   - imaging all (-) for fx  - hx of aortic dissection repair  - chronic htn  - chronic hypothyroidism    DNR    Palliative consult     Discussed patient condition and risk of morbidity and/or mortality with RN, RT, Therapies and Pharmacy.    The patient remains critically ill.  Critical care time = 20 minutes in directly providing and coordinating critical care and extensive data review.  No time overlap and excludes procedures.

## 2017-09-11 NOTE — CONSULTS
DATE OF SERVICE:  09/11/2017    REQUESTING PHYSICIAN:  Woodrow Ware MD    CONSULTING PHYSICIAN:  Arturo Durham MD    TYPE OF CONSULTATION:  Pulmonary medicine and critical care medicine.    REASON FOR CONSULTATION:  Critical care management in lady with neurologic   changes consistent with possible stroke.    CHIEF COMPLAINT:  The patient cannot provide.    HISTORY OF PRESENT ILLNESS:  The entire history is obtained from healthcare   providers and medical record as this lady cannot give me any history.  I was   kindly asked by the medical residents under the direction of Dr. Ware to   see and evaluate this lady regarding the above problems.    This is an 85-year-old lady who was admitted to [facility]   on or about 09/08/2017.  She was admitted with urinary tract infection and   mechanical ground level fall.  She had right hip pain.  Imaging of her pelvis   and right hip did not reveal an acute fracture.  She had previously been   evaluated in urgent care and was treated for urinary tract infection.  She was   continued on antibiotics for her urinary tract infection during her   hospitalization.  This evening, nursing noticed that she was having difficulty   with her speech and may have had some left-sided weakness.  A code stroke was   called as well as a rapid response.  CT scan of the head shows no acute   pathology.  She has evidence of a meningioma measuring 18 mm in the midline   within the frontal region at the level of the falx.  CT angiography of the   neck showed a type B dissection with evidence of repair.  There was no   significant stenosis in the carotid arteries.  CT angiography of the head   showed no thrombus or aneurysm.  She was transferred to the intensive care   unit.  A stat electrocardiogram revealed new atrial fibrillation with a heart   rate of 103.    At the time of my evaluation in the intensive care unit, she does respond.    Her speech is  slightly slurred.  She is having difficulty vocalizing to me.    She appears to have maybe some mild left-sided weakness.    CURRENT MEDICATIONS:  She is on levothyroxine 100 mcg a day, valsartan 160 mg   a day, aspirin 81 mg a day, atorvastatin 20 mg a day, cefdinir 300 mg every 12   hours, enoxaparin 40 mg a day.    ALLERGIES:  To PENICILLIN.    PAST SURGICAL HISTORY:  She had an aortic dissection repair in 1999 and a   right femur fracture repair.    ILLNESSES:  Hypothyroidism, systemic arterial hypertension.    SOCIAL HISTORY, FAMILY HISTORY AND REVIEW OF SYSTEMS:  Not obtainable.    PHYSICAL EXAMINATION:  VITAL SIGNS:  Her temperature is 97.6, her blood pressure is 163/116, heart   rate 97, and respiratory rate is 24.  GENERAL:  She is a tachypneic lady.  HEENT:  Normocephalic, atraumatic.  Sinuses are nontender.  Nares patent.    Oropharynx with moist mucous membranes.  NECK:  Trachea midline, supple.  CHEST:  Symmetrical.  HEART:  Regular rhythm.  LUNGS:  Clear to auscultation.  ABDOMEN:  Soft, nondistended, nontender.  EXTREMITIES:  No clubbing or cyanosis.  NEUROLOGIC:  She is somnolent, but will arouse.  Her speech is slightly   slurred and she is having difficulty finding words.  She has very mild   weakness on the left, but motor testing is somewhat difficult with her.    DIAGNOSTIC DATA:  Her white blood cell count is 10,300, hemoglobin 14.2,   hematocrit 41.5, platelet count 186,000.  Sodium 136, potassium 3.6, chloride   108, CO2 15, BUN 4, creatinine 0.41, glucose 130.  Troponin I is 0.13.  Lactic   acid 2.7, magnesium 1.7.    CT angiography of the head and neck is as described above.  Her   electrocardiogram shows atrial fibrillation, but at the time of my evaluation   in the intensive care unit, she is spontaneously converted back into sinus   rhythm.    IMPRESSION:  1.  Probable acute ischemic stroke.  CT scan of the head shows no acute   pathology.  CT angiography of the head and neck shows no  vascular occlusion.    She was found to be in new onset atrial fibrillation tonight, but is now   spontaneously converted back into sinus rhythm.  2.  Paroxysmal atrial fibrillation, apparent new onset.  3.  Hypomagnesemia.  4.  Hypokalemia.  5.  Elevated troponin.  Her electrocardiogram does not show evidence of an   acute myocardial infarction.  6.  An 18 mm meningioma.  7.  Right hip pain following ground level fall.  There is no evidence of an   acute hip fracture.  8.  Prior aortic dissection repair.  9.  Systemic arterial hypertension.  10.  Hypothyroidism.    PLAN/MEDICAL DECISION MAKING:  This lady is critically ill.  She is now in the   intensive care unit.  Over the course of my evaluation in the intensive care   unit, she has now converted back into sinus rhythm.  I am going to replete her   magnesium and potassium.  I am going to check thyroid studies and an   echocardiogram.  I am going to fully anticoagulate her with heparin on the   weight based protocol.  She will not receive a loading bolus.  I will follow   serial troponins.  I am going to place her on aspirin as well as increase her   atorvastatin to a high intensity dose.  I am going to switch her cefdinir over   to intravenous ceftriaxone.  She is going to be made n.p.o. at this time.    She is not safe to swallow her medications.  She is do not resuscitate, do not   intubate status.  We will continue with very frequent neurologic checks.  She   is at high risk for deterioration and worsening neurologic status and vital   organ dysfunction.    At the current time, this lady is critically ill.  I spent a total of 95   minutes providing direct critical care services at the bedside.  There has   been no time overlap.    The time spent excludes the time spent performing procedures (05790, [id number]).    The case has been reviewed at length with the rapid response team, the bedside   nurse, respiratory therapy and the medical residents.    Thank you   Jeanie for allowing us to participate in the care of this   lady.  We will continue to follow her with great interest.       ____________________________________     MD JASSI NAM / AYAKA    DD:  09/11/2017 04:51:57  DT:  09/11/2017 05:49:21    D#:  [id number]  Job#:  [id number]    cc: Woodrow Ware MD

## 2017-09-11 NOTE — PROGRESS NOTES
This nurse received report from RN.     Pt received via hospital bed, rapid response from Telemetry 727-1. Pt only oriented to self. Bed rest at this time, awaiting head CT. Changed into a gown, belongings placed in pt closet. IV started. Pt has no c/o pain at this time, but appears agitated. AAO x 1 (self). Denies n/v. No family at bedside. Incontinent urine and bowel. New boyle catheter placed. +BS. VSS. CPOs placed. SCDs in place.     New orders from Dr. Durham for heparin weight-based, boyle catheter, Q.2h neuros, magnesium, potassium.     Will continue to monitor.

## 2017-09-11 NOTE — CODE DOCUMENTATION
RR called for chest pain; pt with stoke like symptoms; also has history of htn and AAA repair-code stroke activated

## 2017-09-11 NOTE — PROGRESS NOTES
Patient more confused and speech more slurred than baseline, restless, c/o chest pain, Rapid response team notified, MD order labs, EKG and CT scan

## 2017-09-11 NOTE — PROGRESS NOTES
ICU Transfer Note    Name Aurora Farias 10/19/1931   Age/Sex 85 y.o. female   MRN [id number]   Code Status DNR     After 5PM or if no immediate response to page, please call for cross-coverage  Attending/Team: SHAI MONTOYA See Patient List for primary contact information  Call [phone] to page    1st Call - Day Intern (R1):    2nd Call - Day Sr. Resident (R2/R3):            Reason for interval visit  (Principal Problem)     CVA. New onset atrial fibrillation.    HPI    85 y.o female with UTI, hx of falls, AA s/p repair, hypothyroidism, and HTN was initially admitted s/p fall with UTI and dehydration. She was rehydrated and received a course of ABT and was awaiting SNF placement. Tonight, RRT was called as the pt indicated that she had chest pain. During the evaluation, the pt was noted to have AMS, right side weakness, and aphasia. Stroke code was called at 1:45 am. On further evaluation, CT head was negative for hemorrhagic stroke and EKG showed new onset A. Fib with new ST depression. Pt remains confused and as per chart review potentially has underlying dementia. She is not a good source of hx and difficult to assess the degree of mental status change. Being transferred to ICU for further management and close observation.    Review of Systems   Unable to perform ROS: Mental status change     Disposition  ICU    Quality Measures    Reviewed items::  EKG reviewed, Labs reviewed, Medications reviewed and Radiology images reviewed  Nance catheter::  Critically Ill - Requiring Accurate Measurement of Urinary Output  Antibiotics:  Treating active infection/contamination beyond 24 hours perioperative coverage          Physical Exam       Vitals:    17 0136 17 0140 17 0300 17 0400   BP: (!) 164/76 154/70     Pulse: 98 (!) 105 98 97   Resp:  (!) 28 (!) 32 (!) 34   Temp:  36.4 °C (97.5 °F)     SpO2:  96% 99% 98%   Weight:       Height:         Body mass index is 23 kg/m².    Oxygen  Therapy:  Pulse Oximetry: 98 %, O2 (LPM): 0, O2 Delivery: None (Room Air)    Physical Exam   HENT:   Head: Normocephalic and atraumatic.   Eyes: Pupils are equal, round, and reactive to light.   Neck: No JVD present.   Cardiovascular: Normal rate, regular rhythm and normal heart sounds.  Exam reveals no gallop and no friction rub.    No murmur heard.  Pulmonary/Chest: Breath sounds normal. No respiratory distress.   Abdominal: Soft. Bowel sounds are normal. She exhibits no distension. There is no tenderness.   Neurological:   Awake. Alert and oriented to self only. Speech slurred, so the pt is difficult to understand. Able to follow simple commands. Strength intact.         Lab Data Review:     9/11/2017  4:42 AM    Recent Labs      09/08/17   1821  09/10/17   0400  09/11/17   0148   SODIUM  137  139  136   POTASSIUM  3.8  3.3*  3.6   CHLORIDE  104  113*  108   CO2  20  19*  15*   BUN  13  5*  4*   CREATININE  0.58  0.32*  0.41*   MAGNESIUM   --    --   1.7   CALCIUM  9.5  8.2*  9.0       Recent Labs      09/08/17   1821  09/10/17   0400  09/11/17   0148   ALTSGPT  11  8  10   ASTSGOT  21  13  16   ALKPHOSPHAT  82  69  82   TBILIRUBIN  3.9*  3.0*  4.1*   GLUCOSE  101*  92  130*       Recent Labs      09/08/17 1821 09/11/17 0148 09/11/17 0149   RBC  5.01   --   4.91   HEMOGLOBIN  14.7   --   14.2   HEMATOCRIT  43.2   --   41.5   PLATELETCT  205   --   186   PROTHROMBTM  15.8*   --    --    APTT  28.6  33.8   --    INR  1.22*   --    --        Recent Labs      09/08/17   1821  09/10/17   0400  09/11/17   0148  09/11/17   0149   WBC  9.2   --    --   10.3   NEUTSPOLYS  68.40   --    --   66.00   LYMPHOCYTES  20.00*   --    --   23.70   MONOCYTES  8.60   --    --   7.00   EOSINOPHILS  1.50   --    --   2.30   BASOPHILS  0.30   --    --   0.60   ASTSGOT  21  13  16   --    ALTSGPT  11  8  10   --    ALKPHOSPHAT  82  69  82   --    TBILIRUBIN  3.9*  3.0*  4.1*   --      CTA head & neck: No acute thrombus or  aneurism. Atherosclerosis. Type B dissection of the aorta, partially visualized. Surgical clips adjacent to the aorta and aortic appearance suggests prior surgical repair. Atherosclerosis. No significant stenosis is identified in the carotid arteries. Small bilateral pleural effusions with adjacent atelectasis.    CT head: No acute intracranial findings. Diffuse atrophy and periventricular white matter changes, consistent with chronic small vessel disease. Hyperdense extra-axial masses in the vertex and left posterior fossa likely represent meningiomas.    CXR: Interval development in mild interstitial edema. Prominence of the cardiac silhouette and mediastinal contours may be related to patient rotation and portable AP technique.        Assessment/Plan     CVA (cerebral vascular accident) (CMS-HCC)   Assessment & Plan      # Stroke code called after pt was found with slurred speech and right sided weakness.  # Pt with new onset A. Fib, so embolic stroke is a possibility.  - Neuro checks Q2H  - Allow for permissive HTN (maintain SBP <220 mm Hg, DBP <120 mm Hg)  - NPO until speech and swallow eval  - O2 via NC @ 2L  -  mg suppository now, high dose statin after speech/swallow  - replace electrolytes: Mag 4mg IV, potassium 10 mEQ IV x 4 doses  - Maintain Mg >2, K >4  - Nance cath, monitor I/O  - NS @ 83ml  - serial troponins, lipid panel, cmp  - maintain seizure and aspiration precautions  - Neurology consult        Atrial fibrillation (CMS-HCC)   Assessment & Plan      # new onset. EKG shows new ST depression. CXR with mild interstitial edema. Currently rate controlled.  - Start Heparin Weight based protocol  - Monitor PTT  - ECHO, TSH, serial troponins     Aortic aneurysm (CMS-HCC)   Assessment & Plan      # pt has hx of AA and repair  # CT shows type b dissection with surgical clips which supports that  # Likely, this is a chronic issue given the patients history of aortic dissection.     Hypertension-  (present on admission)   Assessment & Plan      # Past history of hypertension. Will hold medications until further evaluation        Urinary tract infection- (present on admission)   Assessment & Plan      # UTI present on admission. Urine cx negative x2.  - Stop Cefdinir, start Rocephin 1g IV Q24h  - Monitor I/O       Hypothyroidism- (present on admission)       # continue current management

## 2017-09-11 NOTE — PROGRESS NOTES
Transfer Summary    Ms. Farias is a 86 yo F with a PMH of hypertension, hypothyroidism, aortic dissection status post mesh placement 1999, internal fixation of right proximal femur admitted on 09/08 for a ground level fall. She developed R hip pain after the fall. X-ray pelvis and femur showed no fracture. She also found to have a UTI at an urgent care the day prior to admission is currently receiving treatment. On the evening of 09/10 the patient developed acute onset chest pain, difficulty speaking and some left-sided weakness.  A code stroke was called as well as a rapid response.  CT scan of the head showed no acute pathology, only evidence of previously seen 18mm meningioma in the midline within the frontal region at the level of the falx.  CT angiography of the neck showed a type B dissection with evidence of repair with no significant stenosis of the carotid arteries. CT angiography of the head showed no thrombus or aneurysm.  A stat electrocardiogram revealed new onset atrial fibrillation with questionable ST depressions in lateral leads, rate of 103. Patient was transferred to ICU for closer management. Patient was given high dose ASA, statin and started on Heparin drip. She spontaneously converted to NSR upon arrival to ICU. MRI was ordered which showed no acute infarct. ECHO was ordered EF of 40%, and moderate to severe AR. Troponin was obtained at time of RRT which was 0.13, currently trending up to 1.61. Patient currently denies any chest pain. Her muscle weakness and slurred speech slowly resolved and it is currently at baseline as per daughter.       Things to Follow  - Patient is DNR/DNI  - Troponin, 0.13 ----> 1.61----> pending, continue to follow and if trends down stop following, if trends up continue to trend and consider new EKG  - Neuro recommendations regarding TIA  - Currently on Heparin drip  - Continue Rocephin for 7 days total  - Likely SNF placement

## 2017-09-11 NOTE — THERAPY
"Speech Language Therapy Clinical Swallow Evaluation completed.  Functional Status: The patient presents with poor endurance and decreased level of awareness. She was able to participate but took minimal PO. Approximately 1oz of thick liquid and 1oz pudding was given in 20 minutes. Laryngeal elevation was palpated as complete. She had no overt signs of aspiration of NTL and pudding when given by spoon. Delayed coughing was noted with sip of thick liquid from cup, labial spillage was also noted. She wanted to feed herself but had poor upper extremity coordination and strength. Due to patient's minimal intake of PO, her current LEILA, and signs of dysphagia, I am unable to recommend a diet at this time. She stated she did not want a feeding tube but then said \"whatever you need me to do.\"   Recommendations - Diet:  Consider keeping patient NPO today with small tastes of nectar thick liquid by spoon and by nurse only. SLP to reassess tomorrow if patient's LEILA improves.                           Strategies: Strict 1:1 feeding  and Head of Bed at 90 Degrees                          Medication Administration:    Plan of Care: Will benefit from Speech Therapy 3 times per week  Post-Acute Therapy: Discharge to a transitional care facility for continued skilled therapy services.    See \"Rehab Therapy-Acute\" Patient Summary Report for complete documentation.   "

## 2017-09-11 NOTE — PROGRESS NOTES
ICU Progress Note    Name Aurora Farias 10/19/1931   Age/Sex 85 y.o. female   MRN [id number]   Code Status DNR     After 5PM or if no immediate response to page, please call for cross-coverage  Attending/Team: SHAI MONTOYA/ Dr. Miguel See Patient List for primary contact information  Call [phone] to page    1st Call - Day Intern (R1):   Dr. Retana 2nd Call - Day Sr. Resident (R2/R3):    Dr. Lepe     Reason for interval visit  (Principal Problem)   Possible CVA vs TIA  Paroxysmal atrial fibrillation    HPI  84 yo F with PMH of falls, Aortic dissection s/p repair, hypothyroidism, and HTN was initially admitted s/p fall with UTI and dehydration. She was rehydrated and received a course of antibiotics, awaiting SNF placement. RRT was called on  for new onset chest pain. During the evaluation, the pt was noted to have AMS, right side weakness, and aphasia. Stroke code was called at 1:45 am. On further evaluation, CT head was negative for hemorrhagic stroke and EKG showed new onset A. Fib with new ST depression.Transferred to ICU for further management and close observation.      Interval Update  - Currently in NSR, spontaneous conversion   - Continue Heparin, weight based protocol  - Continue Rocephin  - MRI and ECHO pending  - Will consult Neuro for possible stroke vs TIA and Palliative for advanced care planning    Review of Systems   Unable to perform ROS: Mental status change   Constitutional: Negative for chills and fever.   Eyes: Negative for blurred vision and double vision.   Respiratory: Negative for cough, shortness of breath and wheezing.    Cardiovascular: Negative for chest pain, palpitations and claudication.   Gastrointestinal: Negative for abdominal pain, nausea and vomiting.   Genitourinary: Negative for dysuria.   Musculoskeletal: Negative for myalgias.   Skin: Negative for rash.   Neurological: Positive for weakness. Negative for dizziness, tingling and headaches.    Psychiatric/Behavioral: Negative for depression.     Disposition  ICU    Quality Measures    Reviewed items::  EKG reviewed, Labs reviewed, Medications reviewed and Radiology images reviewed  Nance catheter::  No Nance  : No central line.  DVT prophylaxis pharmacological::  Heparin  DVT prophylaxis - mechanical:  SCDs  Ulcer Prophylaxis::  Not indicated  Antibiotics:  Treating active infection/contamination beyond 24 hours perioperative coverage      Physical Exam     Vitals:    09/11/17 0615 09/11/17 0630 09/11/17 0700 09/11/17 0800   BP:       Pulse: 81 82 91 84   Resp: (!) 28 (!) 31 (!) 30 (!) 28   Temp:       SpO2: 100% 100% 100% 100%   Weight:       Height:         Body mass index is 24.33 kg/m². Weight: 62.3 kg (137 lb 5.6 oz)  Oxygen Therapy:  Pulse Oximetry: 100 %, O2 (LPM): 0, O2 Delivery: None (Room Air)    Physical Exam   Constitutional: She is oriented to person, place, and time and well-developed, well-nourished, and in no distress.   HENT:   Head: Normocephalic and atraumatic.   Eyes: Conjunctivae are normal. Pupils are equal, round, and reactive to light.   Neck: Normal range of motion. Neck supple.   Cardiovascular: Normal rate, regular rhythm and normal heart sounds.    No murmur heard.  Pulmonary/Chest: Breath sounds normal. No respiratory distress.   Abdominal: Soft. Bowel sounds are normal. She exhibits no distension. There is no tenderness.   Neurological: She is alert and oriented to person, place, and time.   Speech slurred but at baseline as per family, No focal deficits noted   Skin: No rash noted. No erythema.       Lab Data Review:   9/11/2017  4:42 AM    Recent Labs      09/08/17   1821  09/10/17   0400  09/11/17   0148   SODIUM  137  139  136   POTASSIUM  3.8  3.3*  3.6   CHLORIDE  104  113*  108   CO2  20  19*  15*   BUN  13  5*  4*   CREATININE  0.58  0.32*  0.41*   MAGNESIUM   --    --   1.7   CALCIUM  9.5  8.2*  9.0       Recent Labs      09/08/17   1821  09/10/17   0400   09/11/17   0148   ALTSGPT  11  8  10   ASTSGOT  21  13  16   ALKPHOSPHAT  82  69  82   TBILIRUBIN  3.9*  3.0*  4.1*   GLUCOSE  101*  92  130*       Recent Labs      09/08/17   1821  09/11/17 0148 09/11/17 0149   RBC  5.01   --   4.91   HEMOGLOBIN  14.7   --   14.2   HEMATOCRIT  43.2   --   41.5   PLATELETCT  205   --   186   PROTHROMBTM  15.8*   --    --    APTT  28.6  33.8   --    INR  1.22*   --    --        Recent Labs      09/08/17   1821  09/10/17   0400  09/11/17 0148 09/11/17 0149   WBC  9.2   --    --   10.3   NEUTSPOLYS  68.40   --    --   66.00   LYMPHOCYTES  20.00*   --    --   23.70   MONOCYTES  8.60   --    --   7.00   EOSINOPHILS  1.50   --    --   2.30   BASOPHILS  0.30   --    --   0.60   ASTSGOT  21  13  16   --    ALTSGPT  11  8  10   --    ALKPHOSPHAT  82  69  82   --    TBILIRUBIN  3.9*  3.0*  4.1*   --      CTA head & neck: No acute thrombus or aneurism. Atherosclerosis. Type B dissection of the aorta, partially visualized. Surgical clips adjacent to the aorta and aortic appearance suggests prior surgical repair. Atherosclerosis. No significant stenosis is identified in the carotid arteries. Small bilateral pleural effusions with adjacent atelectasis.    CT head: No acute intracranial findings. Diffuse atrophy and periventricular white matter changes, consistent with chronic small vessel disease. Hyperdense extra-axial masses in the vertex and left posterior fossa likely represent meningiomas.      Assessment/Plan     CVA (cerebral vascular accident) (CMS-[facility])   Assessment & Plan    - RRT called on 09/11 after acute onset slurred speech and right sided weakness  - Found to have new onset A. Fib at time of RRT  - CT head negative, CTA head/nexk negative for acute thrombus/dissection, previously fixed aortic dissection present  - Neuro checks Q2H  - Allow for permissive HTN, SBP <220 mm Hg, DBP <120 mm Hg  - Given ASA, high dose statin  - Maintain Mg >2, K >4  - NPO until speech and  swallow eval  - MRI ordered  - Neurology consult, appreciate recs  - Will consult palliative care for advanced care planning        Paroxysmal a-fib (CMS-HCC)   Assessment & Plan    - New onset as per family, likely paroxysmal  - Heparin Weight based protocol  - TSH normal  - Currently in NSR  - ECHO pending  - Troponin slightly elevated, will trend        Fall from ground level- (present on admission)   Assessment & Plan    - Patient presented with GLF; mechanical trying to sit in chair, with no prior dizziness/palpitation/syncope/seizure.  - Increased falls/unsteady gait over past few months  - [facility] at [facility] showed incidental finding 1.7cm mass likely meningioma  - X ray pelvis showed no pelvic fracture,  X Ray femur no fracture  - CT head w/o shows no change in meningioma from July  - Daughter reports increased frequency of falls and would like to look into SNF placement  - Awaiting PT/OT consult to evaluate for possible SNF        anion gap metabolic acidosis- (present on admission)   Assessment & Plan    [resolved]  Initially AG 13 w/ normal lytes,   Likely secondary to dehydration and poor oral intake.  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  Likely chronic given the patients history of aortic dissection        Dehydration- (present on admission)   Assessment & Plan    Patient presented with Poor oral intake and loss of apetitite, clinically dry  Dehydration not likely the direct cause of her fall as there was no dizzyness/lightheadedness reported  Continue IVF  Currently holding metoprolol and HCTZ        Hypertension- (present on admission)   Assessment & Plan    - Past history of hypertension  - Hold home metoprolol and hydrochlorothiazide  - Continue valsartan        Urinary tract infection- (present on admission)   Assessment &  Plan    - Recent history UTI presented to the urgent care with symptoms of dysuria ,hematuria and dark color urine  - Urinalysis showed positive leucocyte esterase. Culture not done at urgent care, culture drawn here is pending  - Continue rocephin

## 2017-09-11 NOTE — ASSESSMENT & PLAN NOTE
New onset as per family, likely paroxysmal. TSH normal. Troponins were elevated on presentation but have downtrended. Echo showed EF 40% with moderate/severe AR.  Currently NSR.    - S/p heparin weight based protocol. Discussion regarding risks vs. benefits oflong term AC with warfarin with Pt and Daughter. Decision not to proceed with treatment at this time.   - Rate control with metoprolol 12.5 mg q d

## 2017-09-11 NOTE — CARE PLAN
Problem: Venous Thromboembolism (VTW)/Deep Vein Thrombosis (DVT) Prevention:  Goal: Patient will participate in Venous Thrombosis (VTE)/Deep Vein Thrombosis (DVT)Prevention Measures  Pt started on Heparin gtt last night, no evidence of VTE at this time    Problem: Mobility  Goal: Risk for activity intolerance will decrease  Will mobilize pt today as tolerated

## 2017-09-12 PROBLEM — E87.29 HIGH ANION GAP METABOLIC ACIDOSIS: Status: RESOLVED | Noted: 2017-09-09 | Resolved: 2017-09-12

## 2017-09-12 PROBLEM — E86.0 DEHYDRATION: Status: RESOLVED | Noted: 2017-09-09 | Resolved: 2017-09-12

## 2017-09-12 PROBLEM — R79.89 ELEVATED TROPONIN: Status: ACTIVE | Noted: 2017-09-12

## 2017-09-12 LAB
ANION GAP SERPL CALC-SCNC: 9 MMOL/L (ref 0–11.9)
APTT PPP: 93.6 SEC (ref 24.7–36)
APTT PPP: 96.6 SEC (ref 24.7–36)
BUN SERPL-MCNC: 4 MG/DL (ref 8–22)
CALCIUM SERPL-MCNC: 8.2 MG/DL (ref 8.5–10.5)
CHLORIDE SERPL-SCNC: 108 MMOL/L (ref 96–112)
CO2 SERPL-SCNC: 17 MMOL/L (ref 20–33)
CREAT SERPL-MCNC: 0.4 MG/DL (ref 0.5–1.4)
EKG IMPRESSION: NORMAL
GFR SERPL CREATININE-BSD FRML MDRD: >60 ML/MIN/1.73 M 2
GLUCOSE SERPL-MCNC: 76 MG/DL (ref 65–99)
MAGNESIUM SERPL-MCNC: 2.1 MG/DL (ref 1.5–2.5)
POTASSIUM SERPL-SCNC: 3.8 MMOL/L (ref 3.6–5.5)
SODIUM SERPL-SCNC: 134 MMOL/L (ref 135–145)
TROPONIN I SERPL-MCNC: 1.05 NG/ML (ref 0–0.04)
TROPONIN I SERPL-MCNC: 1.64 NG/ML (ref 0–0.04)

## 2017-09-12 PROCEDURE — 700111 HCHG RX REV CODE 636 W/ 250 OVERRIDE (IP): Performed by: INTERNAL MEDICINE

## 2017-09-12 PROCEDURE — 700102 HCHG RX REV CODE 250 W/ 637 OVERRIDE(OP): Performed by: STUDENT IN AN ORGANIZED HEALTH CARE EDUCATION/TRAINING PROGRAM

## 2017-09-12 PROCEDURE — A9270 NON-COVERED ITEM OR SERVICE: HCPCS | Performed by: INTERNAL MEDICINE

## 2017-09-12 PROCEDURE — 700105 HCHG RX REV CODE 258: Performed by: INTERNAL MEDICINE

## 2017-09-12 PROCEDURE — 36415 COLL VENOUS BLD VENIPUNCTURE: CPT

## 2017-09-12 PROCEDURE — 84484 ASSAY OF TROPONIN QUANT: CPT

## 2017-09-12 PROCEDURE — 83735 ASSAY OF MAGNESIUM: CPT

## 2017-09-12 PROCEDURE — G8987 SELF CARE CURRENT STATUS: HCPCS | Mod: CL

## 2017-09-12 PROCEDURE — 80048 BASIC METABOLIC PNL TOTAL CA: CPT

## 2017-09-12 PROCEDURE — 770020 HCHG ROOM/CARE - TELE (206)

## 2017-09-12 PROCEDURE — A9270 NON-COVERED ITEM OR SERVICE: HCPCS | Performed by: STUDENT IN AN ORGANIZED HEALTH CARE EDUCATION/TRAINING PROGRAM

## 2017-09-12 PROCEDURE — 700111 HCHG RX REV CODE 636 W/ 250 OVERRIDE (IP): Performed by: PHARMACIST

## 2017-09-12 PROCEDURE — 306015 LOCK STAT FOLEY: Performed by: INTERNAL MEDICINE

## 2017-09-12 PROCEDURE — 85730 THROMBOPLASTIN TIME PARTIAL: CPT

## 2017-09-12 PROCEDURE — 700105 HCHG RX REV CODE 258: Performed by: HOSPITALIST

## 2017-09-12 PROCEDURE — 99232 SBSQ HOSP IP/OBS MODERATE 35: CPT | Mod: GC | Performed by: INTERNAL MEDICINE

## 2017-09-12 PROCEDURE — 93010 ELECTROCARDIOGRAM REPORT: CPT | Performed by: INTERNAL MEDICINE

## 2017-09-12 PROCEDURE — 97165 OT EVAL LOW COMPLEX 30 MIN: CPT

## 2017-09-12 PROCEDURE — G8978 MOBILITY CURRENT STATUS: HCPCS | Mod: CL

## 2017-09-12 PROCEDURE — G8979 MOBILITY GOAL STATUS: HCPCS | Mod: CJ

## 2017-09-12 PROCEDURE — 97161 PT EVAL LOW COMPLEX 20 MIN: CPT

## 2017-09-12 PROCEDURE — G8988 SELF CARE GOAL STATUS: HCPCS | Mod: CJ

## 2017-09-12 PROCEDURE — 93005 ELECTROCARDIOGRAM TRACING: CPT | Performed by: STUDENT IN AN ORGANIZED HEALTH CARE EDUCATION/TRAINING PROGRAM

## 2017-09-12 PROCEDURE — 92526 ORAL FUNCTION THERAPY: CPT

## 2017-09-12 PROCEDURE — 700102 HCHG RX REV CODE 250 W/ 637 OVERRIDE(OP): Performed by: INTERNAL MEDICINE

## 2017-09-12 RX ADMIN — VALSARTAN 160 MG: 80 TABLET ORAL at 12:57

## 2017-09-12 RX ADMIN — ASPIRIN 325 MG: 325 TABLET, COATED ORAL at 12:58

## 2017-09-12 RX ADMIN — STANDARDIZED SENNA CONCENTRATE AND DOCUSATE SODIUM 2 TABLET: 8.6; 5 TABLET, FILM COATED ORAL at 12:57

## 2017-09-12 RX ADMIN — LEVOTHYROXINE SODIUM 100 MCG: 100 TABLET ORAL at 12:57

## 2017-09-12 RX ADMIN — ATORVASTATIN CALCIUM 80 MG: 80 TABLET, FILM COATED ORAL at 20:07

## 2017-09-12 RX ADMIN — METOPROLOL SUCCINATE 12.5 MG: 25 TABLET, EXTENDED RELEASE ORAL at 12:56

## 2017-09-12 RX ADMIN — SODIUM CHLORIDE: 9 INJECTION, SOLUTION INTRAVENOUS at 16:37

## 2017-09-12 RX ADMIN — HEPARIN SODIUM 800 UNITS/HR: 5000 INJECTION, SOLUTION INTRAVENOUS at 11:54

## 2017-09-12 RX ADMIN — CEFTRIAXONE SODIUM 1 G: 1 INJECTION, POWDER, FOR SOLUTION INTRAMUSCULAR; INTRAVENOUS at 10:58

## 2017-09-12 ASSESSMENT — PAIN SCALES - GENERAL
PAINLEVEL_OUTOF10: 0
PAINLEVEL_OUTOF10: 2
PAINLEVEL_OUTOF10: 0
PAINLEVEL_OUTOF10: 0
PAINLEVEL_OUTOF10: 2
PAINLEVEL_OUTOF10: 0

## 2017-09-12 ASSESSMENT — COGNITIVE AND FUNCTIONAL STATUS - GENERAL
DAILY ACTIVITIY SCORE: 11
MOVING TO AND FROM BED TO CHAIR: UNABLE
CLIMB 3 TO 5 STEPS WITH RAILING: TOTAL
HELP NEEDED FOR BATHING: A LOT
STANDING UP FROM CHAIR USING ARMS: TOTAL
DRESSING REGULAR UPPER BODY CLOTHING: A LOT
WALKING IN HOSPITAL ROOM: TOTAL
SUGGESTED CMS G CODE MODIFIER MOBILITY: CN
DRESSING REGULAR LOWER BODY CLOTHING: A LOT
EATING MEALS: TOTAL
MOBILITY SCORE: 6
TOILETING: A LOT
TURNING FROM BACK TO SIDE WHILE IN FLAT BAD: UNABLE
MOVING FROM LYING ON BACK TO SITTING ON SIDE OF FLAT BED: UNABLE
SUGGESTED CMS G CODE MODIFIER DAILY ACTIVITY: CL
PERSONAL GROOMING: A LOT

## 2017-09-12 ASSESSMENT — ENCOUNTER SYMPTOMS
ABDOMINAL PAIN: 0
CHILLS: 0
GASTROINTESTINAL NEGATIVE: 1
BRUISES/BLEEDS EASILY: 1
FEVER: 0
VOMITING: 0
MUSCULOSKELETAL NEGATIVE: 1
SHORTNESS OF BREATH: 1
DIARRHEA: 0

## 2017-09-12 ASSESSMENT — GAIT ASSESSMENTS
DEVIATION: BRADYKINETIC;SHUFFLED GAIT;ANTALGIC;STEP TO
DISTANCE (FEET): 2
ASSISTIVE DEVICE: FRONT WHEEL WALKER
GAIT LEVEL OF ASSIST: MINIMAL ASSIST

## 2017-09-12 ASSESSMENT — ACTIVITIES OF DAILY LIVING (ADL): TOILETING: REQUIRES ASSIST

## 2017-09-12 NOTE — THERAPY
"Speech Language Therapy dysphagia treatment completed.     Functional Status:   Pt was awake and alert, and reporting she was hungry.  Daughter present and confirmed the patient eats \"just about everything\" at baseline.  Pt consumed PO trials of nectars, puree and soft solids without any overt s/sx of aspiration.  Mastication was prolonged with soft solids, however no oral residue was noted and pt was without her dentures at the time of evaluation.  Pt consumed approx 2 ounces of water with coughing x2, which is concerning for aspiration.  Pt continues to prefer feeding herself, and is able to do so, however continues to have limited upper extremity coordination and strength.      Recommendations: 1) dysphagia II with nectars, 2) Standby assistance for set up    Plan of Care: Will benefit from Speech Therapy 3 times per week    Post-Acute Therapy: Discharge to a transitional care facility for continued skilled therapy services.    See \"Rehab Therapy-Acute\" Patient Summary Report for complete documentation.     "

## 2017-09-12 NOTE — THERAPY
"Physical Therapy Evaluation completed.   Bed Mobility:     Transfers: Sit to Stand: Moderate Assist  Gait: Level Of Assist: Minimal Assist with Front-Wheel Walker     2 ft  Plan of Care: Will benefit from Physical Therapy 3 times per week  Discharge Recommendations: Equipment: Will Continue to Assess for Equipment Needs. Post-acute therapy Discharge to a transitional care facility for continued skilled therapy services.    See \"Rehab Therapy-Acute\" Patient Summary Report for complete documentation.     "

## 2017-09-12 NOTE — PROGRESS NOTES
"       Internal Medicine Interval Note    Name Aruora Farias 10/19/1931   Age/Sex 85 y.o. female   MRN [id number]   Code Status DNR     After 5PM or if no immediate response to page, please call for cross-coverage  Attending/Team: Dr. Waterman See Patient List for primary contact information  Call [phone] to page    1st Call - Day Intern (R1):   Dr. Hunter 2nd Call - Day Sr. Resident (R2/R3):   Dr. Beyer         Reason for interval visit  (Principal Problem)   CVA (cerebral vascular accident) (CMS-[facility])    Interval Problem Daily Status Update  (24 hours)   Pt seen and examined at bedside. Resting comfortably. She denies any pain. No CP/SOB. No abdominal pain. No dysuria. She is confused and states, \"what if I have to use the bathroom?\" Patient made aware that she has a boyle in place.    Given uptrending of troponin, repeat EKG ordered this morning. NSR. Rate 68. LVH with secondary repolarization abnormalities reported.    Patient may need long term anticoagulation for stroke prevention. However, patient is at risk for bleeding due to recurrent falls and age. Risks and benefits of anticoagulation discussed with patient and daughter. Pt states she does not want to be on AC, and daughter requests a day to discuss this further with her mother.     Review of Systems   Constitutional: Negative for chills and fever.   HENT: Negative.    Respiratory: Positive for shortness of breath.    Cardiovascular: Negative for chest pain and leg swelling.   Gastrointestinal: Negative.  Negative for abdominal pain, diarrhea and vomiting.   Genitourinary: Negative.    Musculoskeletal: Negative.    Neurological:        Confused at baseline.   Endo/Heme/Allergies: Bruises/bleeds easily.        Senile purpura       Consultants/Specialty  Neurology    Disposition  Inpatient    Quality Measures    Reviewed items::  EKG reviewed, Labs reviewed, Medications reviewed and Radiology images reviewed  Boyle catheter::  Urinary Tract " Retention or Urinary Tract Obstruction  DVT prophylaxis pharmacological::  Heparin  DVT prophylaxis - mechanical:  SCDs  Antibiotics:  Treating active infection/contamination beyond 24 hours perioperative coverage          Physical Exam       Vitals:    09/12/17 0240 09/12/17 0344 09/12/17 0721 09/12/17 1059   BP: 140/66 146/69 135/65 144/69   Pulse: 75 79 72 88   Resp: (!) 21 20 17 17   Temp: 37.3 °C (99.2 °F) 37.6 °C (99.7 °F) 37.3 °C (99.2 °F) 37.1 °C (98.8 °F)   SpO2: 95% 92% 94% 96%   Weight: 64.2 kg (141 lb 8.6 oz)      Height:         Body mass index is 25.07 kg/m². Weight: 64.2 kg (141 lb 8.6 oz)  Oxygen Therapy:  Pulse Oximetry: 96 %, O2 (LPM): 0, O2 Delivery: None (Room Air)    Physical Exam   Constitutional: No distress.   HENT:   Head: Normocephalic and atraumatic.   Tongue midline.   Eyes: Conjunctivae are normal. Pupils are equal, round, and reactive to light.   Neck: Neck supple.   Cardiovascular: Normal rate and regular rhythm.    Pulmonary/Chest: Effort normal. No respiratory distress.   Abdominal: Soft. Bowel sounds are normal.   Musculoskeletal: She exhibits no edema.   Neurological: She is alert.   Oriented to person and place. Symmetric weakness of upper extremities.    Skin: She is not diaphoretic.         Lab Data Review:         9/12/2017  7:37 AM    Recent Labs      09/10/17   0400  09/11/17 0148 09/12/17   0445   SODIUM  139  136  134*   POTASSIUM  3.3*  3.6  3.8   CHLORIDE  113*  108  108   CO2  19*  15*  17*   BUN  5*  4*  4*   CREATININE  0.32*  0.41*  0.40*   MAGNESIUM   --   1.7  2.1   CALCIUM  8.2*  9.0  8.2*       Recent Labs      09/10/17   0400  09/11/17   0148  09/12/17   0445   ALTSGPT  8  10   --    ASTSGOT  13  16   --    ALKPHOSPHAT  69  82   --    TBILIRUBIN  3.0*  4.1*   --    GLUCOSE  92  130*  76       Recent Labs      09/11/17   0149 09/11/17   1741  09/12/17   0005  09/12/17   0617   RBC  4.91   --    --    --    --    HEMOGLOBIN  14.2   --    --    --    --     HEMATOCRIT  41.5   --    --    --    --    PLATELETCT  186   --    --    --    --    APTT   --    < >  97.6*  96.6*  93.6*    < > = values in this interval not displayed.       Recent Labs      09/10/17   0400  09/11/17   0148  09/11/17   0149   WBC   --    --   10.3   NEUTSPOLYS   --    --   66.00   LYMPHOCYTES   --    --   23.70   MONOCYTES   --    --   7.00   EOSINOPHILS   --    --   2.30   BASOPHILS   --    --   0.60   ASTSGOT  13  16   --    ALTSGPT  8  10   --    ALKPHOSPHAT  69  82   --    TBILIRUBIN  3.0*  4.1*   --            Assessment/Plan     * CVA (cerebral vascular accident) (CMS-HCC)   Assessment & Plan    RRT called on 09/11 after acute onset slurred speech and right sided weakness. Found to have new onset A. Fib at time of RRT. CT head negative, CTA head/neck negative for acute thrombus/dissection, previously fixed aortic dissection present. MRI negative for acute infarct.  Evaluated by neurology. As per neuro, likely TIA secondary to embolic event from afib. No residual on MRI  - Neuro checks q 2H  - continue ASA, statin  - NPO until speech and swallow eval again today  - Neuro will communicate with palliative care for advanced care planning  - Consider long term AC for stroke prevention        Paroxysmal a-fib (CMS-HCC)   Assessment & Plan    New onset as per family, likely paroxysmal. TSH normal.  Echo showed EF 40% with moderate/severe AR. Troponins elevated (.13->1.61-->1.64). Currently NSR.   - Heparin weight based protocol - will reassess continued need upon determination of plan regarding long term AC  - metoprolol 12.5 mg q d        Fall from ground level- (present on admission)   Assessment & Plan    Patient presented with GLF; mechanical trying to sit in chair, with no prior dizziness/palpitation/syncope/seizure. Increased falls/unsteady gait over past few months. CT-head from 07/17 at [facility] showed incidental finding 1.7cm mass likely meningioma. X ray pelvis showed no pelvic  fracture,  X Ray femur no fracture. CT head w/o shows no change in meningioma from July. 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  Likely chronic given the patients history of aortic dissection        Hypertension- (present on admission)   Assessment & Plan    BP has been stable.  - Hold hydrochlorothiazide  - Continue valsartan and metoprolol        Urinary tract infection- (present on admission)   Assessment & Plan    Recent history UTI presented to the urgent care with symptoms of dysuria ,hematuria and dark color urine. Urinalysis showed positive leucocyte esterase. Neg UCx2. Pt s/p 5 days of abx: 5 doses of cefdinir (9/8-9/10) and 3 doses of ceftriaxone (9/8, 9/11, 9/12). She currently denies any symptoms.  - Antibiotics d/c'ed today

## 2017-09-12 NOTE — PROGRESS NOTES
0700 Upon rounds, No acute distress. Resp even and nonlabored. Bed in lowest and locked position. Side rails up x2. Call light within reach.     0900 Pt. Resting quietly. Calm and cooperative. Alert, orient to person, place, and time. No acute distress. Bed in lowest and locked position. Side rails up x2. Call light within reach.     1100 No acute distress. Aaox4. Resp even and nonlabored. Bed in lowest and locked position. Side rails up x2. Call light within reach.     1200 Pt. Resting in bed. Daughter at bedside. No acute distress. Aaox4. Resp even and nonlabored. Bed in lowest and locked position. Side rails up x2. Call light within reach.     1800 No acute distress. Aaox4. Resp even and nonlabored. Bed in lowest and locked position. Side rails up x2. Call light within reach.

## 2017-09-12 NOTE — PROGRESS NOTES
Pt transferred to [facility] 8 telemetry d/t elevated troponin levels; tele nurse team collected pt at 0215, with all belongings and chart handed off.

## 2017-09-12 NOTE — PROGRESS NOTES
2 RN skin check with Pinky  Generalized bruising to upper extremities and bilateral hips. Sacrum red but slow to joey. Pillows placed under pt and Q2hr turns implemented.

## 2017-09-12 NOTE — CARE PLAN
Problem: Safety  Goal: Will remain free from injury  Outcome: PROGRESSING AS EXPECTED  Fall precautions in place. Bed in lowest position. Non-skid socks in place. Personal possessions within reach. Mobility sign on door. Bed-alarm on. Call light within reach. Pt educated regarding fall prevention and states understanding.       Emani Wilson Fall Risk Assessment:     Last Known Fall: Within the last month  Mobility: Dizziness/generalized weakness, Use of assistive device/requires assist of two people  Medications: Cardiovascular or central nervous system meds  Mental Status/LOC/Awareness: Oriented to person and place  Toileting Needs: Use of catheters or diversion devices  Volume/Electrolyte Status: Use of IV fluids/tube feeds  Communication/Sensory: Visual (Glasses)/hearing deficit  Behavior: Appropriate behavior  Emani Wilson Fall Risk Total: 16  Fall Risk Level: HIGH RISK    Universal Fall Precautions:       Fall Risk Level Interventions:     TRIAL (TELE 8, NEURO, MED ALBIN 5) High Fall Risk Interventions  Place yellow fall risk ID band on patient: completed  Provide patient/family education based on risk assessment: completed  Educate patient/family to call staff for assistance when getting out of bed: completed  Place fall precaution signage outside patient door: completed  Place patient in room close to nursing station: completed  Utilize bed/chair fall alarm: completed  Notify charge of high risk for huddle: completed    Patient Specific Interventions:     Medication: review medications with patient and family and limit combination of prn medications  Mental Status/LOC/Awareness: reorient patient, reinforce falls education, check on patient hourly and utilize bed/chair fall alarm  Toileting: provide frquent toileting  Volume/Electrolyte Status: ensure patient remains hydrated, advance diet as tolerated, monitor abnormal lab values and ensure IV fluids are appropriate  Communication/Sensory: update plan of  care on whiteboard and collaborate with doctor for possible speech therapy consult  Behavioral: instruct/reinforce fall program rationale  Mobility: utilize bed/chair fall alarm and ensure bed is locked and in lowest position

## 2017-09-12 NOTE — PROGRESS NOTES
Pt transferred from S195-1 by this RN. Pt oriented to room, unit, and plan of care. Tele-monitor placed and monitor room notified. All questions answered at this time. Call light within reach; fall precautions in place.

## 2017-09-12 NOTE — CARE PLAN
Problem: Safety  Goal: Will remain free from injury  Bed in low locked position, alarm in use. Room near nurses station.     Problem: Skin Integrity  Goal: Risk for impaired skin integrity will decrease  Q 2 hour turning in place, pillows in use for support and positioning.

## 2017-09-12 NOTE — CONSULTS
DATE OF SERVICE:  09/11/2017    REFERRING PHYSICIAN:  Dr. Jeanette Retana.    REFERRAL REASON:  TIA versus stroke.    HISTORY OF PRESENT ILLNESS:  The patient is an 85-year-old woman who was   admitted to [facility] after suffering a ground level   fall.  She was admitted with right hip pain and urinary tract infection,   although no fracture was found.  Early on the morning of the 11th, where she   noticed that she was having difficulty with her speech and possible left-sided   weakness a code stroke was called.  CT scan of the brain showed no acute   changes, but she was found to be in atrial fibrillation with a rapid   ventricular response.  She was transferred down to the intensive care unit.  I   was called to assess her today and found that her symptoms had resolved from   a neurological standpoint.  Her daughter is at the bedside and agree that she   is at her neurological baseline.  She does have a somewhat slow and slurred   speech and both the patient and her daughter agree this is her normal speech.    The daughter denies any previous history of stroke or TIA.    PAST MEDICAL HISTORY:  Significant for aortic aneurysm status post repair as   well as hypertension and hypothyroidism.    HOME MEDICATIONS:  Include,  1.  Aspirin 81 mg daily.  2.  Lipitor 20 mg daily.  3.  Synthroid 100 mcg daily.  4.  Metoprolol 20 mg daily.  5.  Diovan/hydrochlorothiazide 1 pill daily.    ALLERGIES:  SHE HAS ALLERGIES TO PENICILLINS.    SOCIAL HISTORY:  Notable for her being , living with her daughter.  No   history of tobacco, alcohol or drug use.    REVIEW OF SYSTEMS:  Otherwise, negative except for as mentioned above.    PHYSICAL EXAMINATION:  VITAL SIGNS:  Temperature was 99.0 degrees, blood pressure 147/77, heart rate   of 75.  Her NIH stroke scale score was 0 at the time of the assessment.  GENERAL:  The patient is elderly, frail-appearing but cooperative with the   exam.  MENTAL STATUS:   She is awake, alert, oriented to person and place.  Her speech   is fluent.  Comprehension is intact.  CRANIAL NERVES:  Her pupils are equal, round, react to light.  Her extraocular   movements are intact with no nystagmus.  Visual fields are full to   confrontation bilaterally.  Her face is symmetric.  His facial sensation is   intact.  Tongue is in the midline.  MOTOR:  Shows diffuse generalized weakness, but symmetric throughout with no   drift in the upper extremities.  She has at least 4/5 strength in the   bilateral lower extremities.  SENSATION:  Intact to light touch throughout.  COORDINATION:  Normal finger-to-nose bilaterally.  GAIT:  Deferred.    LABORATORY DATA:  The patient had an MRI of her brain, which showed no acute   infarct.  She has a 1.9 cm meningioma in the interhemispheric fissure.    Moderate cerebral atrophy with moderate chronic microvascular ischemic   changes.  CT angiogram of the head and neck showed no significant stenosis of   the carotid arteries.  An echocardiogram showed an ejection fraction of 40%   with moderate-to-severe aortic insufficiency.    ASSESSMENT AND PLAN:  The patient is an 85-year-old woman who appears to have   a transient ischemic attack with no residual deficits or findings on MRI.    This is likely embolic in nature due to the new onset atrial fibrillation.    RECOMMENDATIONS:  1.  I will continue aspirin 81 mg for now.  She is currently being   heparinized.  A determination will need to be made whether she is a good   candidate for long-term anticoagulation.  2.  PT, OT and speech therapy should evaluate and treat the patient.  3.  The primary service has asked palliative care to consult so hopefully this will help clarify long term goals.  4.  I have no further recommendations at this time.  Please contact the neurologist on call if any further questions or concerns arise.       ____________________________________     Hal George MD    MWB / NTS    DD:   09/11/2017 18:22:26  DT:  09/11/2017 18:48:36    D#:  [id number]  Job#:  [id number]

## 2017-09-12 NOTE — THERAPY
"Occupational Therapy Evaluation completed.   Functional Status: Max A supine > EOB, mod A stand-pivot with FWW, max A LB dressing   Plan of Care: Will benefit from Occupational Therapy 3 times per week  Discharge Recommendations:  Equipment: Will Continue to Assess for Equipment Needs. Post-acute therapy Discharge to a transitional care facility for continued skilled therapy services.    See \"Rehab Therapy-Acute\" Patient Summary Report for complete documentation.    85 y.o. female with h/o aortic disection and dehydration admitted s/p GLF with confusion, weakness. Dx with UTI. Pt seen for OT eval. Presents with generalized weakness, impaired sitting and standing balance negatively impacting basic ADL and mobility. Pt would benefit from acute OT and post-acute SNF to maximize functional independence and safety prior to DC home.     "

## 2017-09-13 PROBLEM — N39.0 URINARY TRACT INFECTION: Status: RESOLVED | Noted: 2017-09-08 | Resolved: 2017-09-13

## 2017-09-13 LAB
ANION GAP SERPL CALC-SCNC: 8 MMOL/L (ref 0–11.9)
APTT PPP: 80.4 SEC (ref 24.7–36)
BUN SERPL-MCNC: 4 MG/DL (ref 8–22)
CALCIUM SERPL-MCNC: 8.2 MG/DL (ref 8.5–10.5)
CHLORIDE SERPL-SCNC: 110 MMOL/L (ref 96–112)
CO2 SERPL-SCNC: 17 MMOL/L (ref 20–33)
CREAT SERPL-MCNC: 0.34 MG/DL (ref 0.5–1.4)
GFR SERPL CREATININE-BSD FRML MDRD: >60 ML/MIN/1.73 M 2
GLUCOSE SERPL-MCNC: 89 MG/DL (ref 65–99)
MAGNESIUM SERPL-MCNC: 1.9 MG/DL (ref 1.5–2.5)
POTASSIUM SERPL-SCNC: 3.6 MMOL/L (ref 3.6–5.5)
SODIUM SERPL-SCNC: 135 MMOL/L (ref 135–145)

## 2017-09-13 PROCEDURE — 90471 IMMUNIZATION ADMIN: CPT

## 2017-09-13 PROCEDURE — 700111 HCHG RX REV CODE 636 W/ 250 OVERRIDE (IP): Performed by: INTERNAL MEDICINE

## 2017-09-13 PROCEDURE — 700102 HCHG RX REV CODE 250 W/ 637 OVERRIDE(OP): Performed by: STUDENT IN AN ORGANIZED HEALTH CARE EDUCATION/TRAINING PROGRAM

## 2017-09-13 PROCEDURE — A9270 NON-COVERED ITEM OR SERVICE: HCPCS | Performed by: STUDENT IN AN ORGANIZED HEALTH CARE EDUCATION/TRAINING PROGRAM

## 2017-09-13 PROCEDURE — 3E0234Z INTRODUCTION OF SERUM, TOXOID AND VACCINE INTO MUSCLE, PERCUTANEOUS APPROACH: ICD-10-PCS | Performed by: INTERNAL MEDICINE

## 2017-09-13 PROCEDURE — 700105 HCHG RX REV CODE 258: Performed by: HOSPITALIST

## 2017-09-13 PROCEDURE — 90670 PCV13 VACCINE IM: CPT | Performed by: INTERNAL MEDICINE

## 2017-09-13 PROCEDURE — 80048 BASIC METABOLIC PNL TOTAL CA: CPT

## 2017-09-13 PROCEDURE — 36415 COLL VENOUS BLD VENIPUNCTURE: CPT

## 2017-09-13 PROCEDURE — A9270 NON-COVERED ITEM OR SERVICE: HCPCS | Performed by: INTERNAL MEDICINE

## 2017-09-13 PROCEDURE — 85730 THROMBOPLASTIN TIME PARTIAL: CPT

## 2017-09-13 PROCEDURE — 99232 SBSQ HOSP IP/OBS MODERATE 35: CPT | Mod: GC | Performed by: INTERNAL MEDICINE

## 2017-09-13 PROCEDURE — 700102 HCHG RX REV CODE 250 W/ 637 OVERRIDE(OP): Performed by: INTERNAL MEDICINE

## 2017-09-13 PROCEDURE — 770020 HCHG ROOM/CARE - TELE (206)

## 2017-09-13 PROCEDURE — 83735 ASSAY OF MAGNESIUM: CPT

## 2017-09-13 RX ORDER — SODIUM CHLORIDE 9 MG/ML
INJECTION, SOLUTION INTRAVENOUS CONTINUOUS
Status: CANCELLED | OUTPATIENT
Start: 2017-09-13

## 2017-09-13 RX ADMIN — VALSARTAN 160 MG: 80 TABLET ORAL at 08:49

## 2017-09-13 RX ADMIN — SODIUM CHLORIDE: 9 INJECTION, SOLUTION INTRAVENOUS at 05:42

## 2017-09-13 RX ADMIN — PNEUMOCOCCAL 13-VALENT CONJUGATE VACCINE 0.5 ML: 2.2; 2.2; 2.2; 2.2; 2.2; 4.4; 2.2; 2.2; 2.2; 2.2; 2.2; 2.2; 2.2 INJECTION, SUSPENSION INTRAMUSCULAR at 20:02

## 2017-09-13 RX ADMIN — ASPIRIN 325 MG: 325 TABLET, COATED ORAL at 08:49

## 2017-09-13 RX ADMIN — LEVOTHYROXINE SODIUM 100 MCG: 100 TABLET ORAL at 05:39

## 2017-09-13 RX ADMIN — METOPROLOL SUCCINATE 12.5 MG: 25 TABLET, EXTENDED RELEASE ORAL at 08:49

## 2017-09-13 RX ADMIN — ATORVASTATIN CALCIUM 80 MG: 80 TABLET, FILM COATED ORAL at 20:02

## 2017-09-13 RX ADMIN — STANDARDIZED SENNA CONCENTRATE AND DOCUSATE SODIUM 2 TABLET: 8.6; 5 TABLET, FILM COATED ORAL at 08:49

## 2017-09-13 ASSESSMENT — PAIN SCALES - GENERAL
PAINLEVEL_OUTOF10: 0

## 2017-09-13 NOTE — PALLIATIVE CARE
"PALLIATIVE CARE FOLLOW UP:  Received message from patient's daughter that she is present and requesting \"that list of placed.\"     Introduced myself and inquired about requested lists. Daughter is still exploring options including SNF versus hospice (home vs. GH vs. AL). Offered to obtain all lists from [facility].     Discussed with [facility] Watts and updated patient's daughter that she will print lists and leave in room. Patient's daughter encouraged to call with any questions, needs or concerns.     Thank you for allowing Palliative Care to follow this patient. Please contact us at  with any questions.   "

## 2017-09-13 NOTE — CARE PLAN
Problem: Communication  Goal: The ability to communicate needs accurately and effectively will improve    Intervention: Educate patient and significant other/support system about the plan of care, procedures, treatments, medications and allow for questions  Patient's white board is updated. Patient is updated on plan of care. All questions were answered.       Problem: Knowledge Deficit  Goal: Knowledge of disease process/condition, treatment plan, diagnostic tests, and medications will improve    Intervention: Explain information regarding disease process/condition, treatment plan, diagnostic tests, and medications and document in education  Pt educated about disease process and plan of care for the day.  Pt verbalized understanding.

## 2017-09-13 NOTE — CARE PLAN
Problem: Communication  Goal: The ability to communicate needs accurately and effectively will improve    Intervention: Simms patient and significant other/support system to call light to alert staff of needs  Call for assistance, understands plan of care, and medication rountine

## 2017-09-13 NOTE — PROGRESS NOTES
Bedside report received from Day RN. Patient's chart and MAR reviewed. 12 hour chart check complete. Pt is awake in bed. Pt denies any additional needs at this time. White board updated. Call light and personal belongings within reach, bed in lowest position.

## 2017-09-13 NOTE — PROGRESS NOTES
Received report from previous nurse regarding prior 12 hours.  POC reviewed with pt, white board updated, pt verbalized understanding, call light within reach.  Pt tolerated morning meds crushed in applesauce.  RN had long discussing with daughter Treva regarding GOC and POC.  RN notified MD that family at bedside.  MD at bedside.  MD aware pt does not have IV access, ok for now until further notice.  Pt conversational.

## 2017-09-13 NOTE — PROGRESS NOTES
"       Internal Medicine Interval Note    Name Aurora Farias       10/19/1931   Age/Sex 85 y.o. female   MRN [id number]   Code Status DNR     After 5PM or if no immediate response to page, please call for cross-coverage  Attending/Team: Dr. Waterman/Jorge Luis See Patient List for primary contact information  Call [phone] to page    1st Call - Day Intern (R1):   Dr. Hunter 2nd Call - Day Sr. Resident (R2/R3):   Dr. Beyer         Reason for interval visit  (Principal Problem)   CVA (cerebral vascular accident) (CMS-[facility])    Interval Problem Daily Status Update  (24 hours)   Pt seen and examined at bedside. Daughter at bedside. Pt is conversational, but is confused at times. IV access was lost and heparin dripped was stopped in the AM. Tolerating PO dysphagia diet. No pain reported. No CP/SOB.     Discussion regarding long term AC with patient and daughter. The patient first requested to start warfarin by reasoning that she would rather \"bleed to death\" than have a stroke. Risk of easy bruising and long term complications from bleeding, and need for weekly blood work discussed. Patient stated she did not want to have any weekly blood work. Daughter did not feel AC was the right choice for her mother and wanted to discuss the medication further with her mother.    Around 2 pm, patient was assessed for decision making capacity by Senior Resident and Medical Student. Patient did not fully understand risks and benefits of AC. Patient requested that her daughter make the decision for her. Daughter was called and the decision was made NOT to proceed with anticoagulation.      ROS  Constitutional: Negative for chills and fever.   HENT: Negative.    Respiratory: Negative for shortness of breath.   Cardiovascular: Negative for chest pain and leg swelling.   Gastrointestinal: Negative.  Negative for abdominal pain, diarrhea and vomiting.   Genitourinary: Negative.    Musculoskeletal: Negative.    Neurological:        " Confused at baseline.   Endo/Heme/Allergies: Bruises/bleeds easily.        Senile purpura      Consultants/Specialty  Neurology    Disposition  Inpatient, awaiting SNF placement    Quality Measures  Quality-Core Measures  Reviewed items::  EKG reviewed, Labs reviewed, Medications reviewed and Radiology images reviewed  DVT prophylaxis pharmacological::  Heparin  DVT prophylaxis - mechanical:  SCDs  Antibiotics:  Treating active infection/contamination beyond 24 hours perioperative coverage         Physical Exam       Vitals:    09/13/17 0345 09/13/17 0530 09/13/17 0800 09/13/17 1200   BP: (!) 164/72 131/66 148/59 135/68   Pulse: 71  65 69   Resp: 18  16 18   Temp: 36.1 °C (96.9 °F)  36.7 °C (98.1 °F) 36.1 °C (96.9 °F)   SpO2: 94%  92% 94%   Weight:       Height:         Body mass index is 25.07 kg/m².    Oxygen Therapy:  Pulse Oximetry: 94 %, O2 (LPM): 0, O2 Delivery: None (Room Air)    Physical Exam  Constitutional: No distress. Conversational.   HENT:   Head: Normocephalic and atraumatic.   Eyes: Conjunctivae are normal. Pupils are equal, round, and reactive to light.   Neck: Neck supple.   Cardiovascular: Normal rate and regular rhythm.    Pulmonary/Chest: Effort normal. No respiratory distress.   Abdominal: Soft. Bowel sounds are normal.   Musculoskeletal: She exhibits no edema.   Neurological: She is alert.   Symmetric weakness of upper extremities.    Skin: She is not diaphoretic.        Lab Data Review:         9/13/2017  2:28 PM    Recent Labs      09/11/17 0148 09/12/17 0445 09/13/17   0525   SODIUM  136  134*  135   POTASSIUM  3.6  3.8  3.6   CHLORIDE  108  108  110   CO2  15*  17*  17*   BUN  4*  4*  4*   CREATININE  0.41*  0.40*  0.34*   MAGNESIUM  1.7  2.1  1.9   CALCIUM  9.0  8.2*  8.2*       Recent Labs      09/11/17 0148 09/12/17   0445  09/13/17   0525   ALTSGPT  10   --    --    ASTSGOT  16   --    --    ALKPHOSPHAT  82   --    --    TBILIRUBIN  4.1*   --    --    GLUCOSE  130*  76  89        Recent Labs      09/11/17   0149 09/12/17   0005  09/12/17   0617  09/13/17   0525   RBC  4.91   --    --    --    --    HEMOGLOBIN  14.2   --    --    --    --    HEMATOCRIT  41.5   --    --    --    --    PLATELETCT  186   --    --    --    --    APTT   --    < >  96.6*  93.6*  80.4*    < > = values in this interval not displayed.       Recent Labs      09/11/17 0148 09/11/17 0149   WBC   --   10.3   NEUTSPOLYS   --   66.00   LYMPHOCYTES   --   23.70   MONOCYTES   --   7.00   EOSINOPHILS   --   2.30   BASOPHILS   --   0.60   ASTSGOT  16   --    ALTSGPT  10   --    ALKPHOSPHAT  82   --    TBILIRUBIN  4.1*   --            Assessment/Plan     * CVA (cerebral vascular accident) (CMS-[facility]CC)   Assessment & Plan    RRT called on 09/11 after acute onset slurred speech and right sided weakness. Found to have new onset A. Fib at time of RRT. CT head negative, CTA head/neck negative for acute thrombus/dissection, previously fixed aortic dissection present. MRI negative for acute infarct.  Evaluated by neurology. As per neuro, likely TIA secondary to embolic event from afib. No residual on MRI  - Neuro checks q 2H  - continue ASA, statin  - Neuro will communicate with palliative care for advanced care planning  - Patient will not proceed with long term AC         Paroxysmal a-fib (CMS-HCC)   Assessment & Plan    New onset as per family, likely paroxysmal. TSH normal. Troponins were elevated on presentation but have downtrended. Echo showed EF 40% with moderate/severe AR.  Currently NSR.   - Heparin weight based protocol stopped this morning. Discussion regarding risks vs. benefits oflong term AC with warfarin with Pt and Daughter. Decision not to proceed with treatment at this time.   - Rate control with metoprolol 12.5 mg q d        Fall from ground level- (present on admission)   Assessment & Plan    Patient presented with GLF; mechanical trying to sit in chair, with no prior  dizziness/palpitation/syncope/seizure. Increased falls/unsteady gait over past few months. CT[facility] from 07/17 at [facility] showed incidental finding 1.7cm mass likely meningioma. X ray pelvis showed no pelvic fracture,  X Ray femur no fracture. CT head w/o shows no change in meningioma from July. 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  Likely chronic given the patients history of aortic dissection        Hypertension- (present on admission)   Assessment & Plan    BP has been stable.  - Hold hydrochlorothiazide  - Continue valsartan and metoprolol

## 2017-09-13 NOTE — CARE PLAN
Problem: Safety  Goal: Will remain free from injury  Outcome: PROGRESSING AS EXPECTED  Fall precautions in place. Bed in lowest position. Non-skid socks in place. Personal possessions within reach. Mobility sign on door. Bed-alarm on. Call light within reach. Pt educated regarding fall prevention and states understanding.     Emani Wilson Fall Risk Assessment:     Last Known Fall: Within the last month  Mobility: Dizziness/generalized weakness, Use of assistive device/requires assist of two people  Medications: Cardiovascular or central nervous system meds  Mental Status/LOC/Awareness: Oriented to person and place  Toileting Needs: Incontinence  Volume/Electrolyte Status: Use of IV fluids/tube feeds  Communication/Sensory: Visual (Glasses)/hearing deficit  Behavior: Appropriate behavior  Emani Wilson Fall Risk Total: 18  Fall Risk Level: HIGH RISK    Universal Fall Precautions:  call light/belongings in reach, bed in low position and locked, siderails up x 2, use non-slip footwear, adequate lighting, clutter free and spill free environment, educate on level of risk, educate to call for assistance    Fall Risk Level Interventions:     TRIAL (TELE 8, NEURO, MED ALBIN 5) High Fall Risk Interventions  Place yellow fall risk ID band on patient: verified  Provide patient/family education based on risk assessment: completed  Educate patient/family to call staff for assistance when getting out of bed: completed  Place fall precaution signage outside patient door: verified  Place patient in room close to nursing station: verified  Utilize bed/chair fall alarm: completed  Notify charge of high risk for huddle: completed    Patient Specific Interventions:     Medication: review medications with patient and family and limit combination of prn medications  Mental Status/LOC/Awareness: reorient patient, check on patient hourly, utilize bed/chair fall alarm and reinforce the use of call light  Toileting: provide frquent  toileting  Volume/Electrolyte Status: ensure patient remains hydrated, monitor abnormal lab values and ensure IV fluids are appropriate  Communication/Sensory: update plan of care on whiteboard  Behavioral: engage patient in daily activities and instruct/reinforce fall program rationale  Mobility: utilize bed/chair fall alarm and ensure bed is locked and in lowest position

## 2017-09-13 NOTE — CARE PLAN
Problem: Safety  Goal: Will remain free from injury    Intervention: Provide assistance with mobility  Call light within reach, bed in lowest and locked position, side rails up x2.

## 2017-09-14 LAB
ANION GAP SERPL CALC-SCNC: 7 MMOL/L (ref 0–11.9)
BUN SERPL-MCNC: 4 MG/DL (ref 8–22)
CALCIUM SERPL-MCNC: 8.9 MG/DL (ref 8.5–10.5)
CHLORIDE SERPL-SCNC: 108 MMOL/L (ref 96–112)
CO2 SERPL-SCNC: 20 MMOL/L (ref 20–33)
CREAT SERPL-MCNC: 0.41 MG/DL (ref 0.5–1.4)
GFR SERPL CREATININE-BSD FRML MDRD: >60 ML/MIN/1.73 M 2
GLUCOSE SERPL-MCNC: 90 MG/DL (ref 65–99)
MAGNESIUM SERPL-MCNC: 1.9 MG/DL (ref 1.5–2.5)
POTASSIUM SERPL-SCNC: 3.4 MMOL/L (ref 3.6–5.5)
SODIUM SERPL-SCNC: 135 MMOL/L (ref 135–145)

## 2017-09-14 PROCEDURE — 36415 COLL VENOUS BLD VENIPUNCTURE: CPT

## 2017-09-14 PROCEDURE — 80048 BASIC METABOLIC PNL TOTAL CA: CPT

## 2017-09-14 PROCEDURE — 770020 HCHG ROOM/CARE - TELE (206)

## 2017-09-14 PROCEDURE — 92526 ORAL FUNCTION THERAPY: CPT

## 2017-09-14 PROCEDURE — A9270 NON-COVERED ITEM OR SERVICE: HCPCS | Performed by: INTERNAL MEDICINE

## 2017-09-14 PROCEDURE — 700102 HCHG RX REV CODE 250 W/ 637 OVERRIDE(OP): Performed by: STUDENT IN AN ORGANIZED HEALTH CARE EDUCATION/TRAINING PROGRAM

## 2017-09-14 PROCEDURE — 83735 ASSAY OF MAGNESIUM: CPT

## 2017-09-14 PROCEDURE — 99231 SBSQ HOSP IP/OBS SF/LOW 25: CPT | Mod: GC | Performed by: INTERNAL MEDICINE

## 2017-09-14 PROCEDURE — 700102 HCHG RX REV CODE 250 W/ 637 OVERRIDE(OP): Performed by: INTERNAL MEDICINE

## 2017-09-14 PROCEDURE — A9270 NON-COVERED ITEM OR SERVICE: HCPCS | Performed by: STUDENT IN AN ORGANIZED HEALTH CARE EDUCATION/TRAINING PROGRAM

## 2017-09-14 RX ADMIN — STANDARDIZED SENNA CONCENTRATE AND DOCUSATE SODIUM 2 TABLET: 8.6; 5 TABLET, FILM COATED ORAL at 08:20

## 2017-09-14 RX ADMIN — VALSARTAN 160 MG: 80 TABLET ORAL at 08:17

## 2017-09-14 RX ADMIN — LEVOTHYROXINE SODIUM 100 MCG: 100 TABLET ORAL at 05:45

## 2017-09-14 RX ADMIN — ASPIRIN 325 MG: 325 TABLET, COATED ORAL at 08:19

## 2017-09-14 RX ADMIN — METOPROLOL SUCCINATE 12.5 MG: 25 TABLET, EXTENDED RELEASE ORAL at 08:16

## 2017-09-14 RX ADMIN — ATORVASTATIN CALCIUM 80 MG: 80 TABLET, FILM COATED ORAL at 20:29

## 2017-09-14 ASSESSMENT — PAIN SCALES - GENERAL
PAINLEVEL_OUTOF10: 0

## 2017-09-14 ASSESSMENT — LIFESTYLE VARIABLES: DO YOU DRINK ALCOHOL: NO

## 2017-09-14 NOTE — DISCHARGE PLANNING
Received call from Abigail with [facility], patient can be accepted pending Auth. [facility] will need an Soc. Sec# for patient.     Call from Rose Marie Loera with [facility], patient can be accepted pending Auth.     Call from Cheryl with [facility], patient can be accepted pending Auth.     Received notice from [facility], patient can be accepted pending bed and Auth.

## 2017-09-14 NOTE — PROGRESS NOTES
Internal Medicine Interval Note    Name Aurora Farias 10/19/1931   Age/Sex 85 y.o. female   MRN [id number]   Code Status DNR     After 5PM or if no immediate response to page, please call for cross-coverage  Attending/Team: Dr. Waterman/Jorge Luis See Patient List for primary contact information  Call [phone] to page    1st Call - Day Intern (R1):   Dr. Hunter 2nd Call - Day Sr. Resident (R2/R3):   Dr. Beyer         Reason for interval visit  (Principal Problem)   CVA (cerebral vascular accident) (CMS-[facility])    Interval Problem Daily Status Update  (24 hours)   Pt seen and examined at bedside. Daughter Treva present. Patient reports no new symptoms. No CP/SOB. No abdominal pain. No fevers/chills. Tolerating PO dysphagia diet. Plan for SNF discussed with patient, and patient is amenable to this. She would like to continue PT upon D/C.    Case discussed with SW. They are working on finding her placement.     ROS  Constitutional: Negative for chills and fever.   HENT: Negative.    Respiratory: Negative for shortness of breath.   Cardiovascular: Negative for chest pain and leg swelling.   Gastrointestinal: Negative.  Negative for abdominal pain, diarrhea and vomiting.   Genitourinary: Negative.    Musculoskeletal: Negative.    Neurological:        Confused at baseline.   Endo/Heme/Allergies: Bruises/bleeds easily.        Senile purpura      Consultants/Specialty  Neuro    Disposition  Awaiting SNF placement    Quality Measures  Quality-Core Measures  Reviewed items::  EKG reviewed, Labs reviewed, Medications reviewed and Radiology images reviewed  DVT prophylaxis - mechanical:  SCDs  Antibiotics:  Treating active infection/contamination beyond 24 hours perioperative coverage      Physical Exam       Vitals:    17 2334 17 0400 17 0804 17 1149   BP: 142/71 128/69 149/68 129/64   Pulse: 67 84 76 67   Resp: 18 17 16 16   Temp: 36.4 °C (97.6 °F) 36.9 °C (98.4 °F) 36.5 °C (97.7 °F)  36.4 °C (97.5 °F)   SpO2: 92% 93% 94% 93%   Weight:       Height:         Body mass index is 24.92 kg/m². Weight: 63.8 kg (140 lb 10.5 oz)  Oxygen Therapy:  Pulse Oximetry: 93 %, O2 (LPM): 0, O2 Delivery: None (Room Air)    Physical Exam  Constitutional: No distress. Conversational.   HENT:   Head: Normocephalic and atraumatic.   Eyes: Conjunctivae are normal. Pupils are equal, round, and reactive to light.   Neck: Neck supple.   Cardiovascular: Normal rate and regular rhythm.    Pulmonary/Chest: Effort normal. No respiratory distress.   Abdominal: Soft. Bowel sounds are normal.   Musculoskeletal: She exhibits no edema.   Neurological: She is alert.   Symmetric weakness of upper extremities.    Skin: She is not diaphoretic    Lab Data Review:         9/14/2017  4:11 PM    Recent Labs      09/12/17   0445  09/13/17   0525  09/14/17   0455   SODIUM  134*  135  135   POTASSIUM  3.8  3.6  3.4*   CHLORIDE  108  110  108   CO2  17*  17*  20   BUN  4*  4*  4*   CREATININE  0.40*  0.34*  0.41*   MAGNESIUM  2.1  1.9  1.9   CALCIUM  8.2*  8.2*  8.9       Recent Labs      09/12/17   0445  09/13/17   0525  09/14/17   0455   GLUCOSE  76  89  90       Recent Labs      09/12/17   0005  09/12/17   0617  09/13/17   0525   APTT  96.6*  93.6*  80.4*                 Assessment/Plan     * CVA (cerebral vascular accident) (CMS-HCC)   Assessment & Plan    RRT called on 09/11 after acute onset slurred speech and right sided weakness. Found to have new onset A. Fib at time of RRT. CT head negative, CTA head/neck negative for acute thrombus/dissection, previously fixed aortic dissection present. MRI negative for acute infarct.  Evaluated by neurology. As per neuro, likely TIA secondary to embolic event from afib. No residual on MRI  - Neuro checks q 2H  - continue ASA, statin  - Neuro will communicate with palliative care for advanced care planning  - Patient will not proceed with long term AC         Paroxysmal a-fib (CMS-HCC)   Assessment  & Plan    New onset as per family, likely paroxysmal. TSH normal. Troponins were elevated on presentation but have downtrended. Echo showed EF 40% with moderate/severe AR.  Currently NSR.   - Heparin weight based protocol stopped this morning. Discussion regarding risks vs. benefits oflong term AC with warfarin with Pt and Daughter. Decision not to proceed with treatment at this time.   - Rate control with metoprolol 12.5 mg q d        Fall from ground level- (present on admission)   Assessment & Plan    Patient presented with GLF; mechanical trying to sit in chair, with no prior dizziness/palpitation/syncope/seizure. Increased falls/unsteady gait over past few months. CT[facility] from 07/17 at [facility] showed incidental finding 1.7cm mass likely meningioma. X ray pelvis showed no pelvic fracture,  X Ray femur no fracture. CT head w/o shows no change in meningioma from July. 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  Likely chronic given the patients history of aortic dissection        Hypertension- (present on admission)   Assessment & Plan    BP has been stable.  - Hold hydrochlorothiazide  - Continue valsartan and metoprolol

## 2017-09-14 NOTE — DISCHARGE PLANNING
Transitional Care Navigator:    Met with the patient at the bedside regarding choice for skilled facility.  Pt has no preference and was agreeable to sending to the four facilities that take her insurance. Choice form completed and faxed. Message left for the patient's daughter for feedback as well.

## 2017-09-14 NOTE — THERAPY
"Speech Language Therapy dysphagia treatment completed.     Functional Status:  Pt was seen at breakfast with a dysphagia II/NTL meal tray.  Pt was AAOx2, some confusion noted.  She had dentures in place, vocal quality was clear but reduced in volume.  Pt consumed soft solids (6 bites) and nectars without any overt s/sx of aspiration.  No oral residue was noted, and mastication was effective as compared to last session due to having dentures.  Pt was able to self feed with more control today, given some hand over hand assistance.  Pt politely refusing any further PO due to \"not feeling well\".  Recommend to continue a dysphagia II diet with nectars, with direct assistance.      Recommendations: dysphagia II with nectars, with direct assistance     Plan of Care: Will benefit from Speech Therapy 3 times per week    Post-Acute Therapy: Discharge to a transitional care facility for continued skilled therapy services.    See \"Rehab Therapy-Acute\" Patient Summary Report for complete documentation.     "

## 2017-09-14 NOTE — DISCHARGE PLANNING
Received choice form from LONNIE Tripp for SNF services, referral has been sent to [facility] (3rd) and [facility] (4th) per patient request.

## 2017-09-14 NOTE — PALLIATIVE CARE
Palliative Care follow-up  Met with alva Tilley at bedside.  Patient resting comfortably not engaging in this conversation, at the beginning.  We discussed patient's ongoing status, placement options (list of group homes provided to daughter) SNF, group home vs home with Hospice.      I provided placement and Hospice education, SNF & Hospice choice forms, group home list and education regarding categories/levels, and private hire caregivers.  Daughter was able to meet with [facility] MDs at bedside and review current status, plan of care and review of discharge plan.  Daughter stated understanding and agreed with ongoing planning.    Contact information provided to to Treva for Palliative Care.        Updated: [facility], PHYLLIS     Plan: alva Tilley, to investigate placement options and level of care.  Palliative Care will continue to follow for support and education.      Thank you for allowing Palliative Care to participate in this patient's care. Please feel free to call [phone] with any questions or concerns.

## 2017-09-14 NOTE — PROGRESS NOTES
Bedside report received from Day RN. Patient's chart and MAR reviewed. 12 hour chart check complete. Pt is awake in bed. Patient was updated on plan of care for the day. Questions answered and concerns addressed.  Pt denies any additional needs at this time. White board updated. Call light and personal belongings within reach, bed in lowest position.

## 2017-09-14 NOTE — PROGRESS NOTES
Monitor summary: .20/.08/.42 SR 63-79 with rare PACs and PVCs; HR up to 130s unsustained with mobility.

## 2017-09-14 NOTE — CARE PLAN
Problem: Safety  Goal: Will remain free from injury  Outcome: PROGRESSING AS EXPECTED  Fall precautions in place. Bed in lowest position. Non-skid socks in place. Personal possessions within reach. Mobility sign on door. Bed-alarm on. Call light within reach. Pt educated regarding fall prevention and states understanding.       Emani Wilson Fall Risk Assessment:     Last Known Fall: Within the last year  Mobility: Use of assistive device/requires assist of two people  Medications: Cardiovascular or central nervous system meds  Mental Status/LOC/Awareness: Oriented to person and place  Toileting Needs: Incontinence  Volume/Electrolyte Status: Use of IV fluids/tube feeds  Communication/Sensory: Visual (Glasses)/hearing deficit  Behavior: Appropriate behavior  Emani Wilson Fall Risk Total: 15  Fall Risk Level: HIGH RISK    Universal Fall Precautions:  call light/belongings in reach, bed in low position and locked, siderails up x 2, use non-slip footwear, adequate lighting, clutter free and spill free environment, educate on level of risk, educate to call for assistance    Fall Risk Level Interventions:     TRIAL (TELE 8, NEURO, MED ALBIN 5) High Fall Risk Interventions  Place yellow fall risk ID band on patient: verified  Provide patient/family education based on risk assessment: completed  Educate patient/family to call staff for assistance when getting out of bed: completed  Place fall precaution signage outside patient door: verified  Place patient in room close to nursing station: verified  Utilize bed/chair fall alarm: verified  Notify charge of high risk for huddle: completed    Patient Specific Interventions:     Medication: review medications with patient and family and limit combination of prn medications  Mental Status/LOC/Awareness: reorient patient, check on patient hourly and utilize bed/chair fall alarm  Toileting: provide frquent toileting  Volume/Electrolyte Status: ensure patient remains hydrated,  monitor abnormal lab values and ensure IV fluids are appropriate  Communication/Sensory: update plan of care on whiteboard  Behavioral: instruct/reinforce fall program rationale  Mobility: utilize bed/chair fall alarm and ensure bed is locked and in lowest position

## 2017-09-15 LAB
ANION GAP SERPL CALC-SCNC: 10 MMOL/L (ref 0–11.9)
BUN SERPL-MCNC: 5 MG/DL (ref 8–22)
CALCIUM SERPL-MCNC: 9.1 MG/DL (ref 8.5–10.5)
CHLORIDE SERPL-SCNC: 106 MMOL/L (ref 96–112)
CO2 SERPL-SCNC: 21 MMOL/L (ref 20–33)
CREAT SERPL-MCNC: 0.42 MG/DL (ref 0.5–1.4)
GFR SERPL CREATININE-BSD FRML MDRD: >60 ML/MIN/1.73 M 2
GLUCOSE SERPL-MCNC: 87 MG/DL (ref 65–99)
POTASSIUM SERPL-SCNC: 3.8 MMOL/L (ref 3.6–5.5)
SODIUM SERPL-SCNC: 137 MMOL/L (ref 135–145)

## 2017-09-15 PROCEDURE — A9270 NON-COVERED ITEM OR SERVICE: HCPCS | Performed by: STUDENT IN AN ORGANIZED HEALTH CARE EDUCATION/TRAINING PROGRAM

## 2017-09-15 PROCEDURE — 80048 BASIC METABOLIC PNL TOTAL CA: CPT

## 2017-09-15 PROCEDURE — 700102 HCHG RX REV CODE 250 W/ 637 OVERRIDE(OP): Performed by: STUDENT IN AN ORGANIZED HEALTH CARE EDUCATION/TRAINING PROGRAM

## 2017-09-15 PROCEDURE — A9270 NON-COVERED ITEM OR SERVICE: HCPCS | Performed by: INTERNAL MEDICINE

## 2017-09-15 PROCEDURE — 97535 SELF CARE MNGMENT TRAINING: CPT

## 2017-09-15 PROCEDURE — 99231 SBSQ HOSP IP/OBS SF/LOW 25: CPT | Mod: GC | Performed by: INTERNAL MEDICINE

## 2017-09-15 PROCEDURE — 700102 HCHG RX REV CODE 250 W/ 637 OVERRIDE(OP): Performed by: INTERNAL MEDICINE

## 2017-09-15 PROCEDURE — 36415 COLL VENOUS BLD VENIPUNCTURE: CPT

## 2017-09-15 PROCEDURE — 97530 THERAPEUTIC ACTIVITIES: CPT

## 2017-09-15 PROCEDURE — 770020 HCHG ROOM/CARE - TELE (206)

## 2017-09-15 RX ADMIN — VALSARTAN 160 MG: 80 TABLET ORAL at 11:08

## 2017-09-15 RX ADMIN — LEVOTHYROXINE SODIUM 100 MCG: 100 TABLET ORAL at 06:07

## 2017-09-15 RX ADMIN — METOPROLOL SUCCINATE 12.5 MG: 25 TABLET, EXTENDED RELEASE ORAL at 11:09

## 2017-09-15 RX ADMIN — STANDARDIZED SENNA CONCENTRATE AND DOCUSATE SODIUM 2 TABLET: 8.6; 5 TABLET, FILM COATED ORAL at 11:09

## 2017-09-15 RX ADMIN — ASPIRIN 325 MG: 325 TABLET, COATED ORAL at 11:08

## 2017-09-15 RX ADMIN — ATORVASTATIN CALCIUM 80 MG: 80 TABLET, FILM COATED ORAL at 21:22

## 2017-09-15 ASSESSMENT — COGNITIVE AND FUNCTIONAL STATUS - GENERAL
SUGGESTED CMS G CODE MODIFIER DAILY ACTIVITY: CK
PERSONAL GROOMING: A LITTLE
DAILY ACTIVITIY SCORE: 15
DRESSING REGULAR UPPER BODY CLOTHING: A LITTLE
HELP NEEDED FOR BATHING: A LOT
EATING MEALS: A LITTLE
TOILETING: A LOT
DRESSING REGULAR LOWER BODY CLOTHING: A LOT

## 2017-09-15 ASSESSMENT — PAIN SCALES - GENERAL
PAINLEVEL_OUTOF10: 0

## 2017-09-15 NOTE — THERAPY
"Occupational Therapy Treatment completed with focus on ADLs.  Functional Status:  MAX A supine to sit. MOD assist SPT to and from chair. Set-up/supervision self feeding.  Plan of Care: Will benefit from Occupational Therapy 3 times per week  Discharge Recommendations:  Equipment will continue to assess. Post-acute therapy Discharge to a transitional care facility for continued skilled therapy services.    See \"Rehab Therapy-Acute\" Patient Summary Report for complete documentation.   "

## 2017-09-15 NOTE — PROGRESS NOTES
Emani Wilson Fall Risk Assessment:     Last Known Fall: Within the last year  Mobility: Use of assistive device/requires assist of two people  Medications: Cardiovascular or central nervous system meds  Mental Status/LOC/Awareness: Oriented to person and place  Toileting Needs: Incontinence  Volume/Electrolyte Status: No problems  Communication/Sensory: Visual (Glasses)/hearing deficit  Behavior: Appropriate behavior  Emani Wilson Fall Risk Total: 13  Fall Risk Level: MODERATE RISK    Universal Fall Precautions:  call light/belongings in reach, bed in low position and locked, use non-slip footwear, adequate lighting, clutter free and spill free environment, educate on level of risk, educate to call for assistance    Fall Risk Level Interventions:    TRIAL (TELE 8, NEURO, MED ALBIN 5) Moderate Fall Risk Interventions  Place yellow fall risk ID band on patient: verified  Provide patient/family education based on risk assessment : verified  Educate patient/family to call staff for assistance when getting out of bed: verified  Place fall precaution signage outside patient door: verified  Utilize bed/chair fall alarm: verifiedTRIAL (TELE 8, NEURO, [employer] on [employer] ALBIN 5) High Fall Risk Interventions  Place yellow fall risk ID band on patient: verified  Provide patient/family education based on risk assessment: completed  Educate patient/family to call staff for assistance when getting out of bed: completed  Place fall precaution signage outside patient door: verified  Place patient in room close to nursing station: verified  Utilize bed/chair fall alarm: verified  Notify charge of high risk for huddle: completed    Patient Specific Interventions:     Medication: review medications with patient and family and assess for medications that can be discontinued or dosage decreased  Mental Status/LOC/Awareness: reorient patient, reinforce falls education, check on patient hourly, utilize bed/chair fall alarm and reinforce the use of  call light  Toileting: provide frquent toileting and instruct patient/family on the need to call for assistance when toileting  Volume/Electrolyte Status: not applicable  Communication/Sensory: update plan of care on whiteboard and ensure patient has glasses/contacts and hearing aids/dentures  Behavioral: engage patient in daily activities and administer medication as ordered  Mobility: utilize bed/chair fall alarm and ensure bed is locked and in lowest position

## 2017-09-15 NOTE — CARE PLAN
Problem: Communication  Goal: The ability to communicate needs accurately and effectively will improve    Intervention: Educate patient and significant other/support system about the plan of care, procedures, treatments, medications and allow for questions  Discussed POC and medications with patient. Pt verbalized understanding.        Problem: Safety  Goal: Will remain free from injury    Intervention: Educate patient and significant other/support system about adaptive mobility strategies and safe transfers  Bed locked and in lowest position. Bed alarm on. Treaded socks provided. Call light and belongings within reach.  Patient educated to call for assistance. Pt verbalized understanding. Hourly rounding in place.

## 2017-09-15 NOTE — DISCHARGE PLANNING
Call placed to [facility], info given on patient [employer] to process Auth.

## 2017-09-15 NOTE — PROGRESS NOTES
Bedside report received from night shift RN. Assumed care. Pt is A&Ox3, disoriented to time. Pt is in bed. Pt denies pain at this time. Pt was updated on the plan of care for the day. All questions answered. Pt has call light within reach, bed is in lowest position and bed alarm is on. All fall precautions in place. Pt has non-slip socks on, and appropriate signs are on the door. Pt has no other needs at this time.

## 2017-09-15 NOTE — DISCHARGE PLANNING
SW called and spoke with [facility] requesting f/u with [facility]. Per CCS, needs pt's Social Security number.    SW will meet with pt to get SS number

## 2017-09-15 NOTE — CARE PLAN
Problem: Pain Management  Goal: Pain level will decrease to patient's comfort goal  Outcome: PROGRESSING AS EXPECTED  Pt educated on 0 - 10 pain rating scale, denies pain at this time

## 2017-09-15 NOTE — PROGRESS NOTES
Emani Wilson Fall Risk Assessment:     Last Known Fall: Within the last month  Mobility: Use of assistive device/requires assist of two people  Medications: Cardiovascular or central nervous system meds  Mental Status/LOC/Awareness: Oriented to person and place, Memory loss/confusion and requires reorienting  Toileting Needs: Incontinence, Diarrhea/frequency/urgency  Volume/Electrolyte Status: No problems  Communication/Sensory: Visual (Glasses)/hearing deficit  Behavior: Appropriate behavior  Emani Wilson Fall Risk Total: 22  Fall Risk Level: HIGH RISK    Universal Fall Precautions:  call light/belongings in reach, bed in low position and locked, siderails up x 2, use non-slip footwear, adequate lighting, clutter free and spill free environment    Fall Risk Level Interventions:    TRIAL (TELE 8, NEURO, MED ALBIN 5) Moderate Fall Risk Interventions  Place yellow fall risk ID band on patient: verified  Provide patient/family education based on risk assessment : verified  Educate patient/family to call staff for assistance when getting out of bed: verified  Place fall precaution signage outside patient door: verified  Utilize bed/chair fall alarm: verifiedTRIAL (TELE 8, NEURO, [employer] ALBIN 5) High Fall Risk Interventions  Place yellow fall risk ID band on patient: verified  Provide patient/family education based on risk assessment: completed  Educate patient/family to call staff for assistance when getting out of bed: completed  Place fall precaution signage outside patient door: verified  Place patient in room close to nursing station: verified  Utilize bed/chair fall alarm: verified  Notify charge of high risk for huddle: completed    Patient Specific Interventions:     Medication: review medications with patient and family  Mental Status/LOC/Awareness: reorient patient, reinforce falls education, check on patient hourly, utilize bed/chair fall alarm and reinforce the use of call light  Toileting: provide frquent  toileting, instruct patient/family on the use of grab bars, instruct patient/family on the need to call for assistance when toileting and do not leave patient unattended in bathroom/refer to toileting scripting  Volume/Electrolyte Status: ensure patient remains hydrated and monitor abnormal lab values  Communication/Sensory: update plan of care on whiteboard, ensure proper positioning when transferrng/ambulating and ensure patient has glasses/contacts and hearing aids/dentures  Behavioral: encourage patient to voice feelings, engage patient in daily activities, administer medication as ordered and instruct/reinforce fall program rationale  Mobility: utilize bed/chair fall alarm, ensure bed is locked and in lowest position, provide appropriate assistive device, instruct patient to exit bed on their strongest side and collaborate with doctor for possible PT/OT consult

## 2017-09-15 NOTE — PROGRESS NOTES
"       Internal Medicine Interval Note    Name Aurora Farias       10/19/1931   Age/Sex 85 y.o. female   MRN [id number]   Code Status DNR     After 5PM or if no immediate response to page, please call for cross-coverage  Attending/Team: Dr. Waterman/Jorge Luis See Patient List for primary contact information  Call [phone] to page    1st Call - Day Intern (R1):    2nd Call - Day Sr. Resident (R2/R3):   Dr. Beyer         Reason for interval visit  (Principal Problem)   CVA (cerebral vascular accident) (CMS-[facility])    Interval Problem Daily Status Update  (24 hours)   Pt seen and examined at bedside. Unchanged from yesterday. Resting comfortably in bed and says \"I want to sleep.\" Patient has been accepted to numerous SNFs, awaiting insurance approval. Daughter Treva is leaning towards [facility]. No CP/SOB. No abdominal pain.    ROS  Constitutional: Negative for chills and fever.   HENT: Negative.    Respiratory: Negative for shortness of breath.   Cardiovascular: Negative for chest pain and leg swelling.   Gastrointestinal: Negative.  Negative for abdominal pain, diarrhea and vomiting.   Genitourinary: Negative.    Musculoskeletal: Negative.    Neurological:        Confused at baseline.   Endo/Heme/Allergies: Bruises/bleeds easily.        Senile purpura     Consultants/Specialty  Neuro    Disposition  Awaiting SNF    Quality Measures  Quality-Core Measures  Reviewed items::  EKG reviewed, Labs reviewed, Medications reviewed and Radiology images reviewed  DVT prophylaxis - mechanical:  SCDs        Physical Exam       Vitals:    17 2339 09/15/17 0323 09/15/17 0750 09/15/17 1142   BP: 137/64 132/62 146/62 128/63   Pulse: 71 77 72 77   Resp: 16 17 18 16   Temp: 36.2 °C (97.1 °F) 36.3 °C (97.3 °F) 36.3 °C (97.4 °F) 36.4 °C (97.5 °F)   SpO2: 94% 93% 91% 94%   Weight:       Height:         Body mass index is 24.53 kg/m². Weight: 62.8 kg (138 lb 7.2 oz)  Oxygen Therapy:  Pulse Oximetry: 94 %, O2 " (LPM): 0, O2 Delivery: None (Room Air)    Physical Exam  Constitutional: No distress. Conversational.   HENT:   Head: Normocephalic and atraumatic.   Eyes: Conjunctivae are normal. Pupils are equal, round, and reactive to light.   Neck: Neck supple.   Cardiovascular: Normal rate and regular rhythm.    Pulmonary/Chest: Effort normal. No respiratory distress.   Abdominal: Soft. Bowel sounds are normal.   Musculoskeletal: She exhibits no edema.   Neurological: She is alert.   Symmetric weakness of upper extremities.    Skin: She is not diaphoretic    Lab Data Review:         9/15/2017  2:31 PM    Recent Labs      09/13/17 0525 09/14/17 0455  09/15/17   0351   SODIUM  135  135  137   POTASSIUM  3.6  3.4*  3.8   CHLORIDE  110  108  106   CO2  17*  20  21   BUN  4*  4*  5*   CREATININE  0.34*  0.41*  0.42*   MAGNESIUM  1.9  1.9   --    CALCIUM  8.2*  8.9  9.1       Recent Labs      09/13/17 0525 09/14/17 0455  09/15/17   0351   GLUCOSE  89  90  87       Recent Labs      09/13/17 0525   APTT  80.4*                 Assessment/Plan     * CVA (cerebral vascular accident) (CMS-HCC)   Assessment & Plan    RRT called on 09/11 after acute onset slurred speech and right sided weakness. Found to have new onset A. Fib at time of RRT. CT head negative, CTA head/neck negative for acute thrombus/dissection, previously fixed aortic dissection present. MRI negative for acute infarct.  Evaluated by neurology. As per neuro, likely TIA secondary to embolic event from afib. No residual on MRI  - Neuro checks q 2H  - continue ASA, statin  - Palliative care for advanced care planning  - Patient will not proceed with long term AC         Paroxysmal a-fib (CMS-HCC)   Assessment & Plan    New onset as per family, likely paroxysmal. TSH normal. Troponins were elevated on presentation but have downtrended. Echo showed EF 40% with moderate/severe AR.  Currently NSR.   - S/p heparin weight based protocol. Discussion regarding risks vs.  benefits oflong term AC with warfarin with Pt and Daughter. Decision not to proceed with treatment at this time.   - Rate control with metoprolol 12.5 mg q d        Fall from ground level- (present on admission)   Assessment & Plan    Patient presented with GLF; mechanical trying to sit in chair, with no prior dizziness/palpitation/syncope/seizure. Increased falls/unsteady gait over past few months. CT-head from 07/17 at [facility] showed incidental finding 1.7cm mass likely meningioma. X ray pelvis showed no pelvic fracture,  X Ray femur no fracture. CT head w/o shows no change in meningioma from July. Daughter reports increased frequency of falls.  - PT/OT eval recommend SNF  - Pt awaiting SNF placement        Aortic aneurysm (CMS-[facility])   Assessment & Plan    Past history of aortic dissection with mesh placement  Chest x ray showed dilation of thoracic aortic arch aneurysm.  Likely chronic given the patients history of aortic dissection        Hypertension- (present on admission)   Assessment & Plan    BP has been stable.  - Hold hydrochlorothiazide  - Continue valsartan and metoprolol

## 2017-09-15 NOTE — PALLIATIVE CARE
"Palliative Care follow-up  Call from Daughter regarding placement call from [facility].  I reviewed the chart and met with Treva, daughter at bedside.  SW met with patient and received choice from her.  Daughter has been going to local SNF's and feels that she may be \"leaning toward [facility]\".        Updated: Discussed with SW and Palliative Care team.    Plan: Continue to follow and provide assistance when/where able.      Thank you for allowing Palliative Care to participate in this patient's care. Please feel free to call [phone] with any questions or concerns.  "

## 2017-09-15 NOTE — PROGRESS NOTES
Bedside report received. Patient A&O x3. Disoriented to time.  No complaints of pain. Pt SR on monitor.  RA.  POC discussed with patient. Patient verbalized understanding. Call light and belongings within reach. Bed locked and in lowest position, alarm and fall precautions in place.

## 2017-09-15 NOTE — PROGRESS NOTES
Emani Wilson Fall Risk Assessment:     Last Known Fall: Within the last year  Mobility: Use of assistive device/requires assist of two people  Medications: Cardiovascular or central nervous system meds  Mental Status/LOC/Awareness: Awake, alert, and oriented to date, place, and person  Toileting Needs: Incontinence  Volume/Electrolyte Status: No problems  Communication/Sensory: Visual (Glasses)/hearing deficit  Behavior: Appropriate behavior  Emani Wilson Fall Risk Total: 12  Fall Risk Level: MODERATE RISK    Universal Fall Precautions:  call light/belongings in reach, bed in low position and locked, siderails up x 2, use non-slip footwear, adequate lighting, clutter free and spill free environment    Fall Risk Level Interventions:    TRIAL (TELE 8, NEURO, MED ALBIN 5) Moderate Fall Risk Interventions  Place yellow fall risk ID band on patient: verified  Provide patient/family education based on risk assessment : completed  Educate patient/family to call staff for assistance when getting out of bed: completed  Place fall precaution signage outside patient door: verified  Utilize bed/chair fall alarm: verifiedTRIAL (TELE 8, NEURO, [place] ALBIN 5) High Fall Risk Interventions  Place yellow fall risk ID band on patient: verified  Provide patient/family education based on risk assessment: completed  Educate patient/family to call staff for assistance when getting out of bed: completed  Place fall precaution signage outside patient door: verified  Place patient in room close to nursing station: verified  Utilize bed/chair fall alarm: verified  Notify charge of high risk for huddle: completed    Patient Specific Interventions:     Medication: review medications with patient and family and assess for medications that can be discontinued or dosage decreased  Mental Status/LOC/Awareness: reorient patient, reinforce falls education, check on patient hourly, utilize bed/chair fall alarm and reinforce the use of call  light  Toileting: provide frquent toileting, monitor intake and output/use of appropriate interventions, instruct patient/family on the need to call for assistance when toileting and do not leave patient unattended in bathroom/refer to toileting scripting  Volume/Electrolyte Status: ensure patient remains hydrated, monitor abnormal lab values and ensure IV fluids are appropriate  Communication/Sensory: update plan of care on whiteboard and ensure patient has glasses/contacts and hearing aids/dentures  Behavioral: encourage patient to voice feelings, administer medication as ordered and instruct/reinforce fall program rationale  Mobility: utilize bed/chair fall alarm, ensure bed is locked and in lowest position, provide appropriate assistive device and instruct patient to exit bed on their strongest side

## 2017-09-15 NOTE — DISCHARGE SUMMARY
Internal Medicine Discharge Summary      Admit Date:  9/8/2017       Discharge Date:   9/18/2017    Service:   [facility] Internal Medicine Green Team  Attending Physician(s):   Dr. Capps     Senior Resident(s):   Dr. Beyer  Benigno Resident(s):   Dr. Hunter      Primary Diagnosis:   CVA    Secondary Diagnoses:                  Fall from ground level POA: Yes    Paroxysmal a-fib (CMS-HCC) POA: Unknown    Weakness generalized POA: Yes    Falls frequently POA: Yes    Hypertension POA: Yes    Aortic aneurysm (CMS-HCC) POA: Unknown    Elevated troponin POA: Unknown      Overview: Pt had elevations in troponins from 9/11-9/12 that have begun to trend       down(0.13, 1.61, 1.64, 1.05). She had ST depressions seen on EKG on 9/11.       Questionable history of CP in the ICU. Patient may have had an NSTEMI. She       currently denies any CP/SOB.      - continue ASA, statin, b-blocker  Resolved Problems:    Urinary tract infection POA: Yes    Dehydration POA: Yes    anion gap metabolic acidosis POA: Yes    Hospital Summary (Brief Narrative):       85 y.o. female with PMH aortic dissection s/p repair, hypothyroidism, femoral fracture s/p internal fixation, osteopenia and recent diagnosis of UTI presented to the ED on 9/8 s/p ground level fall at home with R hip pain. Per patient and family, she has frequent falls (3-4) during the last few months, seems to be mechanical without associated dizziness, palpitations or syncope. Family notes poor oral intake and gait instability on ambulation, with most recent incident earlier on day of presentation, where patient was trying to sit on a chair and slipped out of chair and fell to ground on her right side. No reported head trauma, confusion, seizures or syncope.   Of note, patient was confused few days prior to presentation, went to , was diagnosed with urinary tract infection and started on Cefdinir (initial dose was on 9/7).    In the emergency department an IV was  established. The patient was placed on a cardiac monitor.She had received intravenous fentanyl from the EMS crew prior to arrival and required oxygen by nasal cannula thereafter. The patient was given a dose of ceftriaxone and home dose cefdinir was resumed.  ray pelvis showed no pelvic fracture,  X Ray femur showed no fracture. She was started on IVF. UA showed +leukocyte esterase. She was found to have an AG of 13, with Na and K WNL. CXR showed dilation of thoracic aortic arch aneurysm consistent with h/o aortic dissection. EKG showed NSR with non-specific ST/T changes with LVH and a HR of 64.     On 9/9, it was reported that CT head from July at [facility] showed an incidental finding of 1.7 cm mass, likely meningioma. A CT head w/o contrast was ordered on 9/10 to evaluate any changes in meningioma and reported no change.    On 9/11 a rapid response was called at 1:35 am for chest pain. Evaluated at bedside, patient had an acute change in her mental status. As per chart review, patient had been noted to have gradually worsening mental status in the last few months. However, as per RN patient was able to hold a conversation. CT head was negative for hemorrhagic stroke and EKG showed new onset A. Fib with new ST depression in anterior leads. Serial troponins were elevated and peaked around 1.6. Transferred to ICU for further management and close observation. Heparin drip was started in the ICU. Pt was continued on IV ceftriaxone for UTI.  An echocardiogram showed an ejection fraction of 40%   with moderate-to-severe aortic insufficiency. Neuro was consulted to r/o stroke vs. TIA. As per neurology, she was thought to have   a transient ischemic attack with no residual deficits or findings on MRI.  This was likely embolic in nature due to the new onset atrial fibrillation. Aspirin was continued. PT/OT and palliative care was consulted. The patient returned to [facility].     On 9/12 pt returned to medical floor. Pt  was in no distress. Medical management of a suspected type II MI resumed with a BB, ARB, ASA, statin. Heparin drip for Presumed cardioembolic TIA was continued. Conversation regarding long term anticoagulation was started. Antbiotic course for UTI was completed.     On 9/13 Heparin drip was stopped. Pt and daughter made the decision not to anticoagulate after discussion of risks vs. Benefits.     Since 9/14, no acute events have occurred. SNF options discussed with patient.     Pt is now medically clear for discharge to [facility]    Consultants:     Neurology    Procedures:        None    Imaging/ Testing:      ECHOCARDIOGRAM COMP W/O CONT   Final Result      MR-BRAIN-WITH & W/O   Final Result      1.  No acute infarct.   2.  There is an approximately 1.9 cm sized enhancing extra-axial lesion in the anterior interhemispheric fissure likely representing an incidental meningioma.   3.  Moderate chronic microvascular ischemic disease.   4.  Moderate cerebral atrophy.      CT-CTA NECK WITH & W/O-POST PROCESSING   Final Result      1.  Type B dissection of the aorta, partially visualized. Surgical clips adjacent to the aorta and aortic appearance suggests prior surgical repair.      2.  Atherosclerosis. No significant stenosis is identified in the carotid arteries      3.  Small bilateral pleural effusions with adjacent atelectasis.         Comment: Results discussed with Dr. Hooker at 3:17 AM      DX-CHEST-PORTABLE (1 VIEW)   Final Result      1.  Interval development in mild interstitial edema.      2.  Prominence of the cardiac silhouette and mediastinal contours may be related to patient rotation and portable AP technique.      CT-CTA HEAD WITH & W/O-POST PROCESS   Final Result      1.  No acute thrombus or aneurysm identified.      2.  Atherosclerosis.      CT-HEAD W/O   Final Result      1.  No acute intracranial findings.      2.  Diffuse atrophy and periventricular white matter changes, consistent with  chronic small vessel disease.      3.  Hyperdense extra-axial masses in the vertex and left posterior fossa likely represent meningiomas.         CT-HEAD W/O   Final Result      1.  No evidence of acute intracranial process.      2.  Cerebral atrophy as well as periventricular chronic small vessel ischemic change.      3.  18 mm extra-axial mass present at the midline within the frontal region along the level of the falx. This has an appearance most consistent with meningioma.      DX-PELVIS-1 OR 2 VIEWS   Final Result      1.  Internal fixation of RIGHT proximal femur.   2.  Diffuse osteopenia.   3.  No pelvic fracture.      DX-FEMUR-1 VIEW RIGHT   Final Result      1.  No fracture of the RIGHT femur.   2.  Prior internal fixation of proximal RIGHT femur.   3.  Moderate to severe degenerative change of RIGHT hip and knee.      DX-CHEST-LIMITED (1 VIEW)   Final Result      1.  There is aneurysmal dilatation of the thoracic aortic arch, probably chronic.   2.  Possible right mid hemithorax lung nodule.   3.  There are surgical clips in the right axilla.          Discharge Medications:         Medication Reconciliation: Completed       Medication List      ASK your doctor about these medications      Instructions   aspirin 81 MG tablet   Take 81 mg by mouth every evening.  Dose:  81 mg     atorvastatin 20 MG Tabs  Commonly known as:  LIPITOR   Take 20 mg by mouth every evening.  Dose:  20 mg     cefdinir 300 MG Caps  Commonly known as:  OMNICEF  Ask about: Should I take this medication?   Take 1 Cap by mouth every 12 hours for 7 days.  Dose:  300 mg     CRANBERRY PLUS VITAMIN C 4200-20-3 MG-MG-UNIT Caps  Generic drug:  Cranberry-Vitamin C-Vitamin E   Take 2 Caps by mouth 2 Times a Day.  Dose:  2 Cap     Iron 240 (27 Fe) MG Tabs   Take 1 Tab by mouth every evening.  Dose:  1 Tab     levothyroxine 100 MCG Tabs  Commonly known as:  SYNTHROID   Take 100 mcg by mouth Every morning on an empty stomach.  Dose:  100 mcg      Melatonin 10 MG Caps   Take 1 Cap by mouth every bedtime.  Dose:  1 Cap     metoprolol 200 MG XL tablet  Commonly known as:  TOPROL-XL   Take 100 mg by mouth every evening.  Dose:  100 mg     MULTIVITAMIN GUMMIES WOMENS Chew   Take 1 Tab by mouth every morning.  Dose:  1 Tab     OMEGA-3 PO   Take 1 Cap by mouth every evening.  Dose:  1 Cap     SUPER B COMPLEX PO   Take 1 Tab by mouth every evening.  Dose:  1 Tab     valsartan-hydrochlorothiazide 160-12.5 MG per tablet  Commonly known as:  DIOVAN-HCT   Take 1 Tab by mouth every morning.  Dose:  1 Tab     vitamin D 1000 UNIT Tabs  Commonly known as:  cholecalciferol   Take 1,000 Units by mouth every morning.  Dose:  1000 Units            Can use .DISCHARGEMEDSLIST if going to another facility         Disposition:   SNF    Diet:   Dysphagia     Activity:   As tolerated    Instructions:        The patient was instructed to return to the ER in the event of worsening symptoms. I have counseled the patient on the importance of compliance and the patient has agreed to proceed with all medical recommendations and follow up plan indicated above.   The patient understands that all medications come with benefits and risks. Risks may include permanent injury or death and these risks can be minimized with close reassessment and monitoring.        Follow up appointment details :      Follow up with PMD    Pending Studies:        None    Time spent on discharge day patient visit, preparing discharge paperwork and arranging for patient follow up.    Summary of follow up issues:   None    Discharge Time (Minutes) :    60        Condition on Discharge    ______________________________________________________________________    Interval history/exam for day of discharge:     Stable    Vitals:    09/17/17 2307 09/18/17 0306 09/18/17 0758 09/18/17 1143   BP: 155/61 153/61 122/67 135/64   Pulse: 74 67 77 75   Resp: 15 17 16 16   Temp: 36.1 °C (97 °F) 36.1 °C (96.9 °F) 36.3 °C (97.4 °F)  36.7 °C (98 °F)   SpO2: 94% 94% 94% 96%   Weight:       Height:         Weight/BMI: Body mass index is 23 kg/m².  Pulse Oximetry: 96 %, O2 (LPM): 0, O2 Delivery: None (Room Air)    General: NAD  CVS: RRR. No m/r/g  PULM: CTAB      Most Recent Labs:    Lab Results   Component Value Date/Time    WBC 10.3 09/11/2017 01:49 AM    RBC 4.91 09/11/2017 01:49 AM    HEMOGLOBIN 14.2 09/11/2017 01:49 AM    HEMATOCRIT 41.5 09/11/2017 01:49 AM    MCV 84.5 09/11/2017 01:49 AM    MCH 28.9 09/11/2017 01:49 AM    MCHC 34.2 09/11/2017 01:49 AM    MPV 10.2 09/11/2017 01:49 AM    NEUTSPOLYS 66.00 09/11/2017 01:49 AM    LYMPHOCYTES 23.70 09/11/2017 01:49 AM    MONOCYTES 7.00 09/11/2017 01:49 AM    EOSINOPHILS 2.30 09/11/2017 01:49 AM    BASOPHILS 0.60 09/11/2017 01:49 AM      Lab Results   Component Value Date/Time    SODIUM 134 (L) 09/18/2017 05:01 AM    POTASSIUM 3.9 09/18/2017 05:01 AM    CHLORIDE 105 09/18/2017 05:01 AM    CO2 20 09/18/2017 05:01 AM    GLUCOSE 86 09/18/2017 05:01 AM    BUN 7 (L) 09/18/2017 05:01 AM    CREATININE 0.44 (L) 09/18/2017 05:01 AM      Lab Results   Component Value Date/Time    ALTSGPT 10 09/11/2017 01:48 AM    ASTSGOT 16 09/11/2017 01:48 AM    ALKPHOSPHAT 82 09/11/2017 01:48 AM    TBILIRUBIN 4.1 (H) 09/11/2017 01:48 AM    ALBUMIN 3.4 09/11/2017 01:48 AM    GLOBULIN 2.4 09/11/2017 01:48 AM    INR 1.22 (H) 09/08/2017 06:21 PM     Lab Results   Component Value Date/Time    PROTHROMBTM 15.8 (H) 09/08/2017 06:21 PM    INR 1.22 (H) 09/08/2017 06:21 PM

## 2017-09-15 NOTE — PALLIATIVE CARE
Palliative Care follow-up  Treva, daughter called informing me that she would like the patient to transfer to [facility].  Message left for Tenaya      Updated: SW and Palliative Care team    Plan: Transfer to SNF for Therapy.    Thank you for allowing Palliative Care to participate in this patient's care. Please feel free to call [phone] with any questions or concerns.

## 2017-09-15 NOTE — DISCHARGE PLANNING
Patient will need to pick a facility for SNF and Auth will be obtained at that point. Patients Soc. Sec # will be need to process auth for SNF.     Per PHYLLIS Kiran, patient has selected [facility]. PHYLLIS was notified that Soc Sec # is needed.

## 2017-09-16 LAB
ANION GAP SERPL CALC-SCNC: 6 MMOL/L (ref 0–11.9)
BUN SERPL-MCNC: 11 MG/DL (ref 8–22)
CALCIUM SERPL-MCNC: 9 MG/DL (ref 8.5–10.5)
CHLORIDE SERPL-SCNC: 107 MMOL/L (ref 96–112)
CO2 SERPL-SCNC: 23 MMOL/L (ref 20–33)
CREAT SERPL-MCNC: 0.54 MG/DL (ref 0.5–1.4)
GFR SERPL CREATININE-BSD FRML MDRD: >60 ML/MIN/1.73 M 2
GLUCOSE SERPL-MCNC: 90 MG/DL (ref 65–99)
POTASSIUM SERPL-SCNC: 3.9 MMOL/L (ref 3.6–5.5)
SODIUM SERPL-SCNC: 136 MMOL/L (ref 135–145)

## 2017-09-16 PROCEDURE — 700102 HCHG RX REV CODE 250 W/ 637 OVERRIDE(OP): Performed by: STUDENT IN AN ORGANIZED HEALTH CARE EDUCATION/TRAINING PROGRAM

## 2017-09-16 PROCEDURE — 36415 COLL VENOUS BLD VENIPUNCTURE: CPT

## 2017-09-16 PROCEDURE — 770020 HCHG ROOM/CARE - TELE (206)

## 2017-09-16 PROCEDURE — A9270 NON-COVERED ITEM OR SERVICE: HCPCS | Performed by: STUDENT IN AN ORGANIZED HEALTH CARE EDUCATION/TRAINING PROGRAM

## 2017-09-16 PROCEDURE — 99231 SBSQ HOSP IP/OBS SF/LOW 25: CPT | Mod: GC | Performed by: INTERNAL MEDICINE

## 2017-09-16 PROCEDURE — 80048 BASIC METABOLIC PNL TOTAL CA: CPT

## 2017-09-16 RX ADMIN — METOPROLOL SUCCINATE 12.5 MG: 25 TABLET, EXTENDED RELEASE ORAL at 09:00

## 2017-09-16 RX ADMIN — STANDARDIZED SENNA CONCENTRATE AND DOCUSATE SODIUM 2 TABLET: 8.6; 5 TABLET, FILM COATED ORAL at 20:52

## 2017-09-16 RX ADMIN — ATORVASTATIN CALCIUM 80 MG: 80 TABLET, FILM COATED ORAL at 20:52

## 2017-09-16 RX ADMIN — VALSARTAN 160 MG: 80 TABLET ORAL at 08:58

## 2017-09-16 RX ADMIN — LEVOTHYROXINE SODIUM 100 MCG: 100 TABLET ORAL at 05:34

## 2017-09-16 RX ADMIN — POLYETHYLENE GLYCOL 3350 1 PACKET: 17 POWDER, FOR SOLUTION ORAL at 08:59

## 2017-09-16 RX ADMIN — ASPIRIN 81 MG: 81 TABLET, COATED ORAL at 08:55

## 2017-09-16 RX ADMIN — STANDARDIZED SENNA CONCENTRATE AND DOCUSATE SODIUM 2 TABLET: 8.6; 5 TABLET, FILM COATED ORAL at 09:00

## 2017-09-16 ASSESSMENT — PAIN SCALES - GENERAL
PAINLEVEL_OUTOF10: 0

## 2017-09-16 NOTE — PROGRESS NOTES
Internal Medicine Interval Note    Name Aurora Farias 10/19/1931   Age/Sex 85 y.o. female   MRN [id number]   Code Status DNR     After 5PM or if no immediate response to page, please call for cross-coverage  Attending/Team: Dr. Waterman/Jorge Luis See Patient List for primary contact information  Call [phone] to page    1st Call - Day Intern (R1):   Dr. Hunter 2nd Call - Day Sr. Resident (R2/R3):   Dr. Beyer         Reason for interval visit  (Principal Problem)   CVA (cerebral vascular accident) (CMS-[facility])    Interval Problem Daily Status Update  (24 hours)   Pt seen and examined at bedside. No CP/SOB. No urinary symptoms. Good appetite. No changes from yesterday. Pt is clinically stable. Awaiting d/c to SNF.     ROS  Constitutional: Pleasant mood. Resting. Negative for chills and fever.   HENT: Negative.    Respiratory: Negative for shortness of breath.   Cardiovascular: Negative for chest pain and leg swelling.   Gastrointestinal: Negative.  Negative for abdominal pain, diarrhea and vomiting.   Genitourinary: Negative.    Musculoskeletal: Negative.    Neurological:        Confused at baseline.   Endo/Heme/Allergies: Bruises/bleeds easily.        Senile purpura     Consultants/Specialty  Neuro     Disposition  Awaiting SNF       Quality-Core Measures    Reviewed items::  Labs reviewed, Medications reviewed   DVT prophylaxis - mechanical:  SCDs    Physical Exam       Vitals:    09/15/17 2008 09/16/17 0006 17 0410 17 0739   BP: 128/64 128/62 136/59 148/63   Pulse: 64 65 68 89   Resp: 16 18 18 20   Temp: 36.6 °C (97.9 °F) 36.3 °C (97.3 °F) 36.1 °C (96.9 °F) 36.5 °C (97.7 °F)   SpO2: 95% 96% 94% 95%   Weight: 63 kg (138 lb 14.2 oz)      Height:         Body mass index is 24.6 kg/m². Weight: 63 kg (138 lb 14.2 oz)  Oxygen Therapy:  Pulse Oximetry: 95 %, O2 (LPM): 0, O2 Delivery: None (Room Air)    Physical Exam  Constitutional: No distress. Conversational.   HENT:   Head: Normocephalic and  atraumatic.   Eyes: Conjunctivae are normal. Pupils are equal, round, and reactive to light.   Neck: Neck supple.   Cardiovascular: Normal rate and regular rhythm.    Pulmonary/Chest: Effort normal. No respiratory distress.   Abdominal: Soft. Bowel sounds are normal.   Musculoskeletal: She exhibits no edema.   Neurological: She is alert.   Symmetric weakness of upper extremities.    Skin: She is not diaphoretic    Lab Data Review:         9/16/2017  11:08 AM    Recent Labs      09/14/17 0455  09/15/17   0351  09/16/17   0500   SODIUM  135  137  136   POTASSIUM  3.4*  3.8  3.9   CHLORIDE  108  106  107   CO2  20  21  23   BUN  4*  5*  11   CREATININE  0.41*  0.42*  0.54   MAGNESIUM  1.9   --    --    CALCIUM  8.9  9.1  9.0       Recent Labs      09/14/17   0455  09/15/17   0351  09/16/17   0500   GLUCOSE  90  87  90       No results for input(s): RBC, HEMOGLOBIN, HEMATOCRIT, PLATELETCT, PROTHROMBTM, APTT, INR, IRON, FERRITIN, TOTIRONBC in the last 72 hours.              Assessment/Plan     * CVA (cerebral vascular accident) (CMS-HCC)   Assessment & Plan    RRT called on 09/11 after acute onset slurred speech and right sided weakness. Found to have new onset A. Fib at time of RRT. CT head negative, CTA head/neck negative for acute thrombus/dissection, previously fixed aortic dissection present. MRI negative for acute infarct.  Evaluated by neurology. As per neuro, likely TIA secondary to embolic event from afib. No residual on MRI.  switched to ASA 81 today.  - Neuro checks q 2H  - continue ASA 81, statin  - Palliative care for advanced care planning  - Patient will not proceed with long term AC         Paroxysmal a-fib (CMS-HCC)   Assessment & Plan    New onset as per family, likely paroxysmal. TSH normal. Troponins were elevated on presentation but have downtrended. Echo showed EF 40% with moderate/severe AR.  Currently NSR.   - S/p heparin weight based protocol. Discussion regarding risks vs. benefits  oflong term AC with warfarin with Pt and Daughter. Decision not to proceed with treatment at this time.   - Rate control with metoprolol 12.5 mg q d        Fall from ground level- (present on admission)   Assessment & Plan    Patient presented with GLF; mechanical trying to sit in chair, with no prior dizziness/palpitation/syncope/seizure. Increased falls/unsteady gait over past few months. CT[facility] 07/17 at [facility] showed incidental finding 1.7cm mass likely meningioma. X ray pelvis showed no pelvic fracture,  X Ray femur no fracture. CT head w/o shows no change in meningioma from July. Daughter reports increased frequency of falls.  - PT/OT eval recommend SNF  - Pt awaiting SNF placement        Aortic aneurysm (CMS-[facility])   Assessment & Plan    Past history of aortic dissection with mesh placement  Chest x ray showed dilation of thoracic aortic arch aneurysm.  Likely chronic given the patients history of aortic dissection        Hypertension- (present on admission)   Assessment & Plan    BP has been stable.  - Hold hydrochlorothiazide  - Continue valsartan and metoprolol

## 2017-09-16 NOTE — PROGRESS NOTES
Emani Wilson Fall Risk Assessment:     Last Known Fall: Within the last month  Mobility: Use of assistive device/requires assist of two people  Medications: Cardiovascular or central nervous system meds  Mental Status/LOC/Awareness: Memory loss/confusion and requires reorienting  Toileting Needs: Incontinence  Volume/Electrolyte Status: No problems  Communication/Sensory: Visual (Glasses)/hearing deficit  Behavior: Appropriate behavior  Emani Wilson Fall Risk Total: 17  Fall Risk Level: HIGH RISK    Universal Fall Precautions:  call light/belongings in reach, bed in low position and locked, siderails up x 2, use non-slip footwear, adequate lighting    Fall Risk Level Interventions:    TRIAL (TELE 8, NEURO, MED ALBIN 5) Moderate Fall Risk Interventions  Place yellow fall risk ID band on patient: verified  Provide patient/family education based on risk assessment : verified  Educate patient/family to call staff for assistance when getting out of bed: verified  Place fall precaution signage outside patient door: verified  Utilize bed/chair fall alarm: verifiedTRIAL (TELE 8, NEURO, [employer] ALBIN 5) High Fall Risk Interventions  Place yellow fall risk ID band on patient: verified  Provide patient/family education based on risk assessment: completed  Educate patient/family to call staff for assistance when getting out of bed: completed  Place fall precaution signage outside patient door: verified  Place patient in room close to nursing station: verified  Utilize bed/chair fall alarm: verified  Notify charge of high risk for huddle: completed    Patient Specific Interventions:     Medication: review medications with patient and family and assess for medications that can be discontinued or dosage decreased  Mental Status/LOC/Awareness: reorient patient, reinforce falls education, check on patient hourly, utilize bed/chair fall alarm and reinforce the use of call light  Toileting: provide frquent toileting, monitor intake and  output/use of appropriate interventions and instruct patient/family on the need to call for assistance when toileting  Volume/Electrolyte Status: ensure patient remains hydrated, advance diet as tolerated and monitor abnormal lab values  Communication/Sensory: update plan of care on whiteboard and ensure patient has glasses/contacts and hearing aids/dentures  Behavioral: administer medication as ordered and instruct/reinforce fall program rationale  Mobility: utilize bed/chair fall alarm, ensure bed is locked and in lowest position, provide appropriate assistive device and instruct patient to exit bed on their strongest side

## 2017-09-16 NOTE — CARE PLAN
Problem: Safety  Goal: Will remain free from injury  Outcome: PROGRESSING AS EXPECTED  Bed locked and in lowest position. Bed alarm on. Treaded socks provided. Call light and belongings within reach.  Patient educated to call for assistance. Pt verbalized understanding. Hourly rounding in place.      Problem: Skin Integrity  Goal: Risk for impaired skin integrity will decrease    Intervention: Assess risk factors for impaired skin integrity and/or pressure ulcers  Skin assessment completed. Q2 turns in place. Pillows in use for positioning and to float heels. Frequent linen changes to ensure patient stays dry. Hourly rounding in place.

## 2017-09-16 NOTE — PROGRESS NOTES
Bedside report received. Patient A&O x3. Disoriented to time. RA. SR/ST on monitor.  No complaints of pain. POC discussed with patient. Patient verbalized understanding. Call light and belongings within reach. Bed locked and in lowest position, alarm and fall precautions in place.

## 2017-09-16 NOTE — PROGRESS NOTES
Emani Wilson Fall Risk Assessment:     Last Known Fall: Within the last month  Mobility: Use of assistive device/requires assist of two people  Medications: Cardiovascular or central nervous system meds  Mental Status/LOC/Awareness: Memory loss/confusion and requires reorienting  Toileting Needs: Incontinence  Volume/Electrolyte Status: No problems  Communication/Sensory: Visual (Glasses)/hearing deficit  Behavior: Appropriate behavior  Emani Wilson Fall Risk Total: 17  Fall Risk Level: HIGH RISK    Universal Fall Precautions:  call light/belongings in reach, bed in low position and locked, siderails up x 2, use non-slip footwear, adequate lighting, clutter free and spill free environment, educate on level of risk, educate to call for assistance    Fall Risk Level Interventions:   TRIAL (TELE 8, NEURO, MED ALBIN 5) Low Fall Risk Interventions  Place yellow fall risk ID band on patient: completed  Provide patient/family education based on risk assessment: completed  Educate patient/family to call staff for assistance when getting out of bed: completed  Place fall precaution signage outside patient door: completedTRIAL (TELE 8, NEURO, MED ALBIN 5) Moderate Fall Risk Interventions  Place yellow fall risk ID band on patient: verified  Provide patient/family education based on risk assessment : verified  Educate patient/family to call staff for assistance when getting out of bed: verified  Place fall precaution signage outside patient door: verified  Utilize bed/chair fall alarm: verifiedTRIAL (TELE 8, NEURO, [employer] ALBIN 5) High Fall Risk Interventions  Place yellow fall risk ID band on patient: verified  Provide patient/family education based on risk assessment: completed  Educate patient/family to call staff for assistance when getting out of bed: completed  Place fall precaution signage outside patient door: verified  Place patient in room close to nursing station: verified  Utilize bed/chair fall alarm:  verified  Notify charge of high risk for huddle: completed    Patient Specific Interventions:     Medication: review medications with patient and family, assess for medications that can be discontinued or dosage decreased and limit combination of prn medications  Mental Status/LOC/Awareness: reinforce falls education, check on patient hourly, utilize bed/chair fall alarm, reinforce the use of call light and provide activity  Toileting: instruct patient/family on the need to call for assistance when toileting, do not leave patient unattended in bathroom/refer to toileting scripting and toilet prior to giving pain medications  Volume/Electrolyte Status: ensure patient remains hydrated, monitor abnormal lab values and teach patients to dangle before rising if hypotensive  Communication/Sensory: update plan of care on whiteboard, for visually impaired patients orient to their room surrounding and do not change their surroundings and have patients with hearing deficit repeat information back to you to ensure proper understanding  Behavioral: encourage patient to voice feelings, engage patient in daily activities, administer medication as ordered and instruct/reinforce fall program rationale  Mobility: schedule physical activity throughout the day, provide comfort measures during transport, dangle prior to standing, utilize bed/chair fall alarm, ensure bed is locked and in lowest position, provide appropriate assistive device, instruct patient to exit bed on their strongest side and collaborate with doctor for possible PT/OT consult

## 2017-09-16 NOTE — CARE PLAN
Problem: Knowledge Deficit  Goal: Knowledge of disease process/condition, treatment plan, diagnostic tests, and medications will improve  Discussed POC and medications with patient, pt. verbalized understanding.

## 2017-09-16 NOTE — PROGRESS NOTES
Bedside report received. Patient A&O x3. RA. SR on the monitor. Disoriented to time. No complaints of pain at this time. POC discussed with patient. Patient verbalized understanding. Call light and belongings within reach. Bed locked and in lowest position, alarm and fall precautions in place.

## 2017-09-16 NOTE — DISCHARGE PLANNING
Spoke with Sabrina at [facility], they are awaiting insurance Auth.  Per Sabrina they will call when received.

## 2017-09-16 NOTE — PROGRESS NOTES
Emani Wilson Fall Risk Assessment:     Last Known Fall: Within the last month  Mobility: Use of assistive device/requires assist of two people  Medications: Cardiovascular or central nervous system meds  Mental Status/LOC/Awareness: Memory loss/confusion and requires reorienting  Toileting Needs: Incontinence  Volume/Electrolyte Status: No problems  Communication/Sensory: Visual (Glasses)/hearing deficit  Behavior: Appropriate behavior  Emani Wilson Fall Risk Total: 17  Fall Risk Level: HIGH RISK    Universal Fall Precautions:  call light/belongings in reach, bed in low position and locked, use non-slip footwear, siderails up x 2, adequate lighting, clutter free and spill free environment, educate on level of risk, educate to call for assistance    Fall Risk Level Interventions:    TRIAL (TELE 8, NEURO, MED ALBIN 5) Moderate Fall Risk Interventions  Place yellow fall risk ID band on patient: verified  Provide patient/family education based on risk assessment : verified  Educate patient/family to call staff for assistance when getting out of bed: verified  Place fall precaution signage outside patient door: verified  Utilize bed/chair fall alarm: verifiedTRIAL (TELE 8, NEURO, [employer] ALBIN 5) High Fall Risk Interventions  Place yellow fall risk ID band on patient: verified  Provide patient/family education based on risk assessment: completed  Educate patient/family to call staff for assistance when getting out of bed: completed  Place fall precaution signage outside patient door: verified  Place patient in room close to nursing station: verified  Utilize bed/chair fall alarm: verified  Notify charge of high risk for huddle: completed    Patient Specific Interventions:     Medication: assess for medications that can be discontinued or dosage decreased, limit combination of prn medications and provide patients who received diuretics/laxatives frequent toileting  Mental Status/LOC/Awareness: reorient patient, reinforce  falls education, check on patient hourly, utilize bed/chair fall alarm, reinforce the use of call light and provide activity  Toileting: consider obtaining elevated toilet seat or bedside commode (BSC), provide frquent toileting, instruct patient/family on the need to call for assistance when toileting and do not leave patient unattended in bathroom/refer to toileting scripting  Volume/Electrolyte Status: not applicable  Communication/Sensory: update plan of care on whiteboard, ensure proper positioning when transferrng/ambulating and ensure patient has glasses/contacts and hearing aids/dentures  Behavioral: encourage patient to voice feelings and engage patient in daily activities  Mobility: utilize bed/chair fall alarm, ensure bed is locked and in lowest position and instruct patient to exit bed on their strongest side

## 2017-09-16 NOTE — CARE PLAN
Problem: Pain Management  Goal: Pain level will decrease to patient's comfort goal    Intervention: Follow pain managment plan developed in collaboration with patient and Interdisciplinary Team  Educated on 0 - 10 pain rating scale, denies pain at this time

## 2017-09-16 NOTE — PROGRESS NOTES
Bedside report received. No overnight events. Patient A&O x 3 disoriented to time. No complaints of pain or SOB at this time. Waiting for SW to follow up with [facility] in [place]. NIHSS this morning = 1. Patient states she has had poor appetite. Last MB 9/12. NO IV ACCESS. POC discussed with patient. Pt verbalizes understanding. Call light and belongings with in reach. Bed locked and in lowest position, alarm and fall precautions in place.

## 2017-09-17 LAB
ANION GAP SERPL CALC-SCNC: 6 MMOL/L (ref 0–11.9)
BUN SERPL-MCNC: 9 MG/DL (ref 8–22)
CALCIUM SERPL-MCNC: 8.8 MG/DL (ref 8.5–10.5)
CHLORIDE SERPL-SCNC: 108 MMOL/L (ref 96–112)
CO2 SERPL-SCNC: 20 MMOL/L (ref 20–33)
CREAT SERPL-MCNC: 0.41 MG/DL (ref 0.5–1.4)
GFR SERPL CREATININE-BSD FRML MDRD: >60 ML/MIN/1.73 M 2
GLUCOSE SERPL-MCNC: 91 MG/DL (ref 65–99)
POTASSIUM SERPL-SCNC: 3.8 MMOL/L (ref 3.6–5.5)
SODIUM SERPL-SCNC: 134 MMOL/L (ref 135–145)

## 2017-09-17 PROCEDURE — 700102 HCHG RX REV CODE 250 W/ 637 OVERRIDE(OP): Performed by: STUDENT IN AN ORGANIZED HEALTH CARE EDUCATION/TRAINING PROGRAM

## 2017-09-17 PROCEDURE — 700111 HCHG RX REV CODE 636 W/ 250 OVERRIDE (IP): Performed by: HOSPITALIST

## 2017-09-17 PROCEDURE — 36415 COLL VENOUS BLD VENIPUNCTURE: CPT

## 2017-09-17 PROCEDURE — A9270 NON-COVERED ITEM OR SERVICE: HCPCS | Performed by: STUDENT IN AN ORGANIZED HEALTH CARE EDUCATION/TRAINING PROGRAM

## 2017-09-17 PROCEDURE — 80048 BASIC METABOLIC PNL TOTAL CA: CPT

## 2017-09-17 PROCEDURE — 770020 HCHG ROOM/CARE - TELE (206)

## 2017-09-17 PROCEDURE — 99231 SBSQ HOSP IP/OBS SF/LOW 25: CPT | Mod: GC | Performed by: INTERNAL MEDICINE

## 2017-09-17 RX ADMIN — ATORVASTATIN CALCIUM 80 MG: 80 TABLET, FILM COATED ORAL at 20:40

## 2017-09-17 RX ADMIN — MAGNESIUM HYDROXIDE 30 ML: 400 SUSPENSION ORAL at 04:26

## 2017-09-17 RX ADMIN — VALSARTAN 160 MG: 80 TABLET ORAL at 09:06

## 2017-09-17 RX ADMIN — LEVOTHYROXINE SODIUM 100 MCG: 100 TABLET ORAL at 05:43

## 2017-09-17 RX ADMIN — ASPIRIN 81 MG: 81 TABLET, COATED ORAL at 09:06

## 2017-09-17 RX ADMIN — METOPROLOL SUCCINATE 12.5 MG: 25 TABLET, EXTENDED RELEASE ORAL at 09:06

## 2017-09-17 RX ADMIN — STANDARDIZED SENNA CONCENTRATE AND DOCUSATE SODIUM 2 TABLET: 8.6; 5 TABLET, FILM COATED ORAL at 09:06

## 2017-09-17 RX ADMIN — ENOXAPARIN SODIUM 40 MG: 100 INJECTION SUBCUTANEOUS at 13:40

## 2017-09-17 RX ADMIN — STANDARDIZED SENNA CONCENTRATE AND DOCUSATE SODIUM 2 TABLET: 8.6; 5 TABLET, FILM COATED ORAL at 20:40

## 2017-09-17 ASSESSMENT — PAIN SCALES - GENERAL
PAINLEVEL_OUTOF10: 0

## 2017-09-17 ASSESSMENT — ENCOUNTER SYMPTOMS
VOMITING: 0
SEIZURES: 0
PALPITATIONS: 0
STRIDOR: 0
CHILLS: 0
FEVER: 0
HEMOPTYSIS: 0
HALLUCINATIONS: 0
SHORTNESS OF BREATH: 0
FALLS: 0
BRUISES/BLEEDS EASILY: 1

## 2017-09-17 NOTE — PROGRESS NOTES
Report received. Assessment complete. Pt A&O x 3 and forgetful. C/O pain with movement, declines pain meds at this time, but has been repositioned. POC discussed. Call light & belongings in reach. Bed locked and low. Alarm on & fall precautions in place.

## 2017-09-17 NOTE — CARE PLAN
Problem: Communication  Goal: The ability to communicate needs accurately and effectively will improve  Outcome: PROGRESSING AS EXPECTED  Even though pt confused, she is able to appropriately express comfort needs effectively. Pt. Denies pain or discomfort at this time and is able to vocalize wants and needs.

## 2017-09-17 NOTE — PROGRESS NOTES
Emani Wilson Fall Risk Assessment:     Last Known Fall: Within the last month  Mobility: Use of assistive device/requires assist of two people  Medications: Cardiovascular or central nervous system meds  Mental Status/LOC/Awareness: Memory loss/confusion and requires reorienting  Toileting Needs: Incontinence  Volume/Electrolyte Status: No problems  Communication/Sensory: Visual (Glasses)/hearing deficit  Behavior: Appropriate behavior  Emani Wilson Fall Risk Total: 17  Fall Risk Level: HIGH RISK    Universal Fall Precautions:  call light/belongings in reach, bed in low position and locked, wheelchairs and assistive devices out of sight, siderails up x 2, use non-slip footwear, adequate lighting, clutter free and spill free environment, educate on level of risk, educate to call for assistance    Fall Risk Level Interventions:   TRIAL ([employer], NEURO, MED ALBIN 5) Low Fall Risk Interventions  Place yellow fall risk ID band on patient: completed  Provide patient/family education based on risk assessment: completed  Educate patient/family to call staff for assistance when getting out of bed: completed  Place fall precaution signage outside patient door: completedTRIAL ([employer] 8, NEURO, MED ALBIN 5) Moderate Fall Risk Interventions  Place yellow fall risk ID band on patient: verified  Provide patient/family education based on risk assessment : verified  Educate patient/family to call staff for assistance when getting out of bed: verified  Place fall precaution signage outside patient door: verified  Utilize bed/chair fall alarm: verifiedTRIAL ([employer] 8, NEURO, [employer] ALBIN 5) High Fall Risk Interventions  Place yellow fall risk ID band on patient: verified  Provide patient/family education based on risk assessment: completed  Educate patient/family to call staff for assistance when getting out of bed: completed  Place fall precaution signage outside patient door: verified  Place patient in room close to nursing station:  verified  Utilize bed/chair fall alarm: verified  Notify charge of high risk for huddle: completed    Patient Specific Interventions:     Medication: review medications with patient and family and limit combination of prn medications  Mental Status/LOC/Awareness: reorient patient, reinforce falls education, check on patient hourly, utilize bed/chair fall alarm and reinforce the use of call light  Toileting: consider obtaining elevated toilet seat or bedside commode (BSC), provide frquent toileting, instruct patient/family on the use of grab bars and instruct patient/family on the need to call for assistance when toileting  Volume/Electrolyte Status: ensure patient remains hydrated and monitor abnormal lab values  Communication/Sensory: update plan of care on whiteboard, ensure proper positioning when transferrng/ambulating and ensure patient has glasses/contacts and hearing aids/dentures  Behavioral: encourage patient to voice feelings, administer medication as ordered and instruct/reinforce fall program rationale  Mobility: provide comfort measures during transport, utilize bed/chair fall alarm, ensure bed is locked and in lowest position, provide appropriate assistive device, instruct patient to exit bed on their strongest side and collaborate with doctor for possible PT/OT consult

## 2017-09-17 NOTE — PROGRESS NOTES
Assumed care at 1900, bedside report received from day shift RN. Patient is AOx3 with no signs of distress. Pt. Is SR 78 on the monitor. Initial assessment completed, orders reviewed, call light within reach, bed alarm in use, and hourly rounding in place. POC addressed with patient, no additional questions at this time.

## 2017-09-17 NOTE — PROGRESS NOTES
2 Rn Skin check done with DRE Jeff    Pt. Ears, elbows, and heels are intact and blanching. She has redness noted under her left panus. 2+ pitting edema noted on her bilateral feet. She has a bruise/ redness on her right lower side. Her sacrum is intact and blanching. She has a healing surgical incision on her lower back. Well approximated. Scabbing. No signs of infection noted. Open to air.

## 2017-09-17 NOTE — PROGRESS NOTES
Internal Medicine Interval Note    Name Aurora Farias 10/19/1931   Age/Sex 85 y.o. female   MRN [id number]   Code Status DNR     After 5PM or if no immediate response to page, please call for cross-coverage  Attending/Team: Dr. Waterman/Jorge Luis See Patient List for primary contact information  Call [phone] to page    1st Call - Day Intern (R1):   Dr. Hunter 2nd Call - Day Sr. Resident (R2/R3):   Dr. Beyer         Reason for interval visit  (Principal Problem)   CVA (cerebral vascular accident) (CMS-[facility])    Interval Problem Daily Status Update  (24 hours)     2017  Vitally stable, on room air, awaiting discharge to SNF.   No labs for tomorrow.    Review of Systems   Constitutional: Negative for chills and fever.   Respiratory: Negative for hemoptysis, shortness of breath and stridor.    Cardiovascular: Negative for chest pain and palpitations.   Gastrointestinal: Negative for vomiting.   Genitourinary: Negative for hematuria.   Musculoskeletal: Negative for falls.   Neurological: Negative for seizures.   Endo/Heme/Allergies: Bruises/bleeds easily.   Psychiatric/Behavioral: Negative for hallucinations.     Consultants/Specialty  Neuro     Disposition  Awaiting SNF         Reviewed items::  Labs reviewed and Medications reviewed  DVT prophylaxis pharmacological::  Enoxaparin (Lovenox)      Physical Exam       Vitals:    17 1929 17 2319 17 0325 17 0749   BP: 131/60 140/65 152/59 135/58   Pulse: 71 69 63 61   Resp:  18   Temp: 36.5 °C (97.7 °F) 36.3 °C (97.3 °F) 36.4 °C (97.6 °F) 36.6 °C (97.9 °F)   SpO2: 95% 95% 94% 97%   Weight:  59.8 kg (131 lb 13.4 oz)     Height:         Body mass index is 23.35 kg/m². Weight: 59.8 kg (131 lb 13.4 oz)  Oxygen Therapy:  Pulse Oximetry: 97 %, O2 (LPM): 0, O2 Delivery: None (Room Air)    Physical Exam  Constitutional: No distress. Conversational.   HENT:   Head: Normocephalic and atraumatic.   Eyes: Conjunctivae are normal.  Pupils are equal, round, and reactive to light.   Neck: Neck supple.   Cardiovascular: Normal rate and regular rhythm.    Pulmonary/Chest: Effort normal. No respiratory distress.   Abdominal: Soft. Bowel sounds are normal.   Musculoskeletal: She exhibits no edema.   Neurological: She is alert.   Symmetric weakness of upper extremities.    Skin: She is not diaphoretic    Lab Data Review:         9/16/2017  11:08 AM    Recent Labs      09/15/17   0351  09/16/17   0500  09/17/17   0210   SODIUM  137  136  134*   POTASSIUM  3.8  3.9  3.8   CHLORIDE  106  107  108   CO2  21  23  20   BUN  5*  11  9   CREATININE  0.42*  0.54  0.41*   CALCIUM  9.1  9.0  8.8       Recent Labs      09/15/17   0351  09/16/17   0500  09/17/17   0210   GLUCOSE  87  90  91       No results for input(s): RBC, HEMOGLOBIN, HEMATOCRIT, PLATELETCT, PROTHROMBTM, APTT, INR, IRON, FERRITIN, TOTIRONBC in the last 72 hours.          Assessment/Plan     * CVA (cerebral vascular accident) (CMS-HCC)   Assessment & Plan    RRT called on 09/11 after acute onset slurred speech and right sided weakness. Found to have new onset A. Fib at time of RRT. CT head negative, CTA head/neck negative for acute thrombus/dissection, previously fixed aortic dissection present. MRI negative for acute infarct.  As per neuro, likely TIA secondary to embolic event from afib. No residual on MRI.   - continue ASA 81, statin  - Palliative care for advanced care planning  - Patient will not proceed with long term AC         Paroxysmal a-fib (CMS-HCC)   Assessment & Plan    New onset as per family, likely paroxysmal. TSH normal. Troponins were elevated on presentation but have downtrended. Echo showed EF 40% with moderate/severe AR.  Currently NSR.    - S/p heparin weight based protocol. Discussion regarding risks vs. benefits oflong term AC with warfarin with Pt and Daughter. Decision not to proceed with treatment at this time.   - Rate control with metoprolol 12.5 mg q d        Fall  from ground level- (present on admission)   Assessment & Plan    Patient presented with GLF; mechanical trying to sit in chair, with no prior dizziness/palpitation/syncope/seizure. Increased falls/unsteady gait over past few months. [facility] 07[facility] showed incidental finding 1.7cm mass likely meningioma. X ray pelvis showed no pelvic fracture,  X Ray femur no fracture. CT head w/o shows no change in meningioma from July. Daughter reports increased frequency of falls.  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  Likely chronic given the patients history of aortic dissection        Hypertension- (present on admission)   Assessment & Plan    BP has been stable.  Hold hydrochlorothiazide  Continue valsartan and metoprolol

## 2017-09-18 VITALS
SYSTOLIC BLOOD PRESSURE: 139 MMHG | HEIGHT: 63 IN | DIASTOLIC BLOOD PRESSURE: 65 MMHG | TEMPERATURE: 97 F | HEART RATE: 70 BPM | RESPIRATION RATE: 16 BRPM | OXYGEN SATURATION: 95 % | BODY MASS INDEX: 23.01 KG/M2 | WEIGHT: 129.85 LBS

## 2017-09-18 LAB
ANION GAP SERPL CALC-SCNC: 9 MMOL/L (ref 0–11.9)
BUN SERPL-MCNC: 7 MG/DL (ref 8–22)
CALCIUM SERPL-MCNC: 9.2 MG/DL (ref 8.5–10.5)
CHLORIDE SERPL-SCNC: 105 MMOL/L (ref 96–112)
CO2 SERPL-SCNC: 20 MMOL/L (ref 20–33)
CREAT SERPL-MCNC: 0.44 MG/DL (ref 0.5–1.4)
GFR SERPL CREATININE-BSD FRML MDRD: >60 ML/MIN/1.73 M 2
GLUCOSE SERPL-MCNC: 86 MG/DL (ref 65–99)
POTASSIUM SERPL-SCNC: 3.9 MMOL/L (ref 3.6–5.5)
SODIUM SERPL-SCNC: 134 MMOL/L (ref 135–145)

## 2017-09-18 PROCEDURE — 700102 HCHG RX REV CODE 250 W/ 637 OVERRIDE(OP): Performed by: STUDENT IN AN ORGANIZED HEALTH CARE EDUCATION/TRAINING PROGRAM

## 2017-09-18 PROCEDURE — 36415 COLL VENOUS BLD VENIPUNCTURE: CPT

## 2017-09-18 PROCEDURE — A9270 NON-COVERED ITEM OR SERVICE: HCPCS | Performed by: STUDENT IN AN ORGANIZED HEALTH CARE EDUCATION/TRAINING PROGRAM

## 2017-09-18 PROCEDURE — 700111 HCHG RX REV CODE 636 W/ 250 OVERRIDE (IP): Performed by: HOSPITALIST

## 2017-09-18 PROCEDURE — 99239 HOSP IP/OBS DSCHRG MGMT >30: CPT | Mod: GC | Performed by: HOSPITALIST

## 2017-09-18 PROCEDURE — 80048 BASIC METABOLIC PNL TOTAL CA: CPT

## 2017-09-18 RX ORDER — VALSARTAN 160 MG/1
160 TABLET ORAL DAILY
Qty: 30 TAB | Refills: 3
Start: 2017-09-18 | End: 2018-09-06

## 2017-09-18 RX ADMIN — ENOXAPARIN SODIUM 40 MG: 100 INJECTION SUBCUTANEOUS at 09:39

## 2017-09-18 RX ADMIN — LEVOTHYROXINE SODIUM 100 MCG: 100 TABLET ORAL at 05:54

## 2017-09-18 RX ADMIN — METOPROLOL SUCCINATE 12.5 MG: 25 TABLET, EXTENDED RELEASE ORAL at 09:39

## 2017-09-18 RX ADMIN — VALSARTAN 160 MG: 80 TABLET ORAL at 09:39

## 2017-09-18 RX ADMIN — STANDARDIZED SENNA CONCENTRATE AND DOCUSATE SODIUM 2 TABLET: 8.6; 5 TABLET, FILM COATED ORAL at 09:39

## 2017-09-18 RX ADMIN — ASPIRIN 81 MG: 81 TABLET, COATED ORAL at 09:39

## 2017-09-18 ASSESSMENT — LIFESTYLE VARIABLES: EVER_SMOKED: NEVER

## 2017-09-18 ASSESSMENT — PAIN SCALES - GENERAL
PAINLEVEL_OUTOF10: 0

## 2017-09-18 NOTE — CARE PLAN
Problem: Communication  Goal: The ability to communicate needs accurately and effectively will improve    Intervention: Use communication aids and/or /Language Line as appropriate  White board updated with POC and care team information during bedside report.

## 2017-09-18 NOTE — PROGRESS NOTES
Bedside report received from DRE Oconnor. POC discussed with pt; all questions answered at this time. White board updated. Appropriate functioning of tele monitor confirmed. All needs met at this time.

## 2017-09-18 NOTE — DISCHARGE INSTRUCTIONS
Discharge Instructions    Discharged to other by medical transportation with escort. Discharged via wheelchair, hospital escort: Refused.  Special equipment needed: Wheelchair    Be sure to schedule a follow-up appointment with your primary care doctor or any specialists as instructed.     Discharge Plan:   Diet Plan: Discussed  Activity Level: Discussed  Confirmed Follow up Appointment: No (Comments)  Confirmed Symptoms Management: Discussed  Medication Reconciliation Updated: Yes  Pneumococcal Vaccine Given - only chart on this line when given: Given (See MAR)  Influenza Vaccine Indication: Indicated: 65 years and older  Influenza Vaccine Given - only chart on this line when given: Influenza Vaccine Given (See MAR)    I understand that a diet low in cholesterol, fat, and sodium is recommended for good health. Unless I have been given specific instructions below for another diet, I accept this instruction as my diet prescrition.     Other diet: Dysphagia with nectar thick liquids    Special Instructions:  and None    · Is patient discharged on Warfarin / Coumadin?   No     · Is patient Post Blood Transfusion?  No    Depression / Suicide Risk    As you are discharged from this [facility] facility, it is important to learn how to keep safe from harming yourself.    Recognize the warning signs:  · Abrupt changes in personality, positive or negative- including increase in energy   · Giving away possessions  · Change in eating patterns- significant weight changes-  positive or negative  · Change in sleeping patterns- unable to sleep or sleeping all the time   · Unwillingness or inability to communicate  · Depression  · Unusual sadness, discouragement and loneliness  · Talk of wanting to die  · Neglect of personal appearance   · Rebelliousness- reckless behavior  · Withdrawal from people/activities they love  · Confusion- inability to concentrate     If you or a loved one observes any of these behaviors or has  concerns about self-harm, here's what you can do:  · Talk about it- your feelings and reasons for harming yourself  · Remove any means that you might use to hurt yourself (examples: pills, rope, extension cords, firearm)  · Get professional help from the community (Mental Health, Substance Abuse, psychological counseling)  · Do not be alone:Call your Safe Contact- someone whom you trust who will be there for you.  · Call your local CRISIS HOTLINE 653-1205 or 041-706-7771  · Call your local Children's Mobile Crisis Response Team [place] (762) 227-3080 or www.Mod[employer]Mi  · Call the toll free National Suicide Prevention Hotlines   · National Suicide Prevention Lifeline 544-240-ZWNM (3369)  · National Hope Line Network 800-SUICIDE (744-0488)

## 2017-09-18 NOTE — PROGRESS NOTES
Emani Wilson Fall Risk Assessment:     Last Known Fall: Within the last month  Mobility: Use of assistive device/requires assist of two people  Medications: Cardiovascular or central nervous system meds  Mental Status/LOC/Awareness: Memory loss/confusion and requires reorienting  Toileting Needs: Incontinence, Use of assistive device (Bedside commode, bedpan, urinal)  Volume/Electrolyte Status: No problems  Communication/Sensory: Visual (Glasses)/hearing deficit  Behavior: Appropriate behavior  Emani Wilson Fall Risk Total: 19  Fall Risk Level: HIGH RISK    Universal Fall Precautions:  call light/belongings in reach, bed in low position and locked, wheelchairs and assistive devices out of sight, siderails up x 2, use non-slip footwear, adequate lighting, clutter free and spill free environment, educate on level of risk, educate to call for assistance    Fall Risk Level Interventions:   TRIAL (TELE 8, NEURO, MED ALBIN 5) Low Fall Risk Interventions  Place yellow fall risk ID band on patient: completed  Provide patient/family education based on risk assessment: completed  Educate patient/family to call staff for assistance when getting out of bed: completed  Place fall precaution signage outside patient door: completedTRIAL (TELE 8, NEURO, MED ALBIN 5) Moderate Fall Risk Interventions  Place yellow fall risk ID band on patient: verified  Provide patient/family education based on risk assessment : verified  Educate patient/family to call staff for assistance when getting out of bed: verified  Place fall precaution signage outside patient door: verified  Utilize bed/chair fall alarm: verifiedTRIAL (TELE 8, NEURO, [employer] ALBIN 5) High Fall Risk Interventions  Place yellow fall risk ID band on patient: verified  Provide patient/family education based on risk assessment: completed  Educate patient/family to call staff for assistance when getting out of bed: completed  Place fall precaution signage outside patient door:  verified  Place patient in room close to nursing station: verified  Utilize bed/chair fall alarm: verified  Notify charge of high risk for huddle: completed    Patient Specific Interventions:     Medication: review medications with patient and family  Mental Status/LOC/Awareness: reorient patient, reinforce falls education, check on patient hourly, utilize bed/chair fall alarm and reinforce the use of call light  Toileting: provide frquent toileting  Volume/Electrolyte Status: ensure patient remains hydrated and monitor abnormal lab values  Communication/Sensory: update plan of care on whiteboard and ensure patient has glasses/contacts and hearing aids/dentures  Behavioral: encourage patient to voice feelings  Mobility: dangle prior to standing, utilize bed/chair fall alarm, ensure bed is locked and in lowest position and provide appropriate assistive device

## 2017-09-18 NOTE — PROGRESS NOTES
Emani Wilson Fall Risk Assessment:     Last Known Fall: Within the last month  Mobility: Use of assistive device/requires assist of two people  Medications: Cardiovascular or central nervous system meds  Mental Status/LOC/Awareness: Memory loss/confusion and requires reorienting  Toileting Needs: Incontinence, Use of assistive device (Bedside commode, bedpan, urinal)  Volume/Electrolyte Status: No problems  Communication/Sensory: Visual (Glasses)/hearing deficit  Behavior: Appropriate behavior  Emani Wilson Fall Risk Total: 19  Fall Risk Level: HIGH RISK    Universal Fall Precautions:  call light/belongings in reach, bed in low position and locked, siderails up x 2, use non-slip footwear, adequate lighting, clutter free and spill free environment, educate on level of risk, educate to call for assistance    Fall Risk Level Interventions:   TRIAL (Datezr, NEURO, MED ALBIN 5) Low Fall Risk Interventions  Place yellow fall risk ID band on patient: completed  Provide patient/family education based on risk assessment: completed  Educate patient/family to call staff for assistance when getting out of bed: completed  Place fall precaution signage outside patient door: completedTRIAL (TELE 8, NEURO, MED ALBIN 5) Moderate Fall Risk Interventions  Place yellow fall risk ID band on patient: verified  Provide patient/family education based on risk assessment : verified  Educate patient/family to call staff for assistance when getting out of bed: verified  Place fall precaution signage outside patient door: verified  Utilize bed/chair fall alarm: verifiedTRIAL ([employer] 8, NEURO, [employer] ALBIN 5) High Fall Risk Interventions  Place yellow fall risk ID band on patient: verified  Provide patient/family education based on risk assessment: completed  Educate patient/family to call staff for assistance when getting out of bed: completed  Place fall precaution signage outside patient door: verified  Place patient in room close to nursing  station: verified  Utilize bed/chair fall alarm: verified  Notify charge of high risk for huddle: completed    Patient Specific Interventions:     Medication: review medications with patient and family  Mental Status/LOC/Awareness: reinforce falls education, check on patient hourly, utilize bed/chair fall alarm and reinforce the use of call light  Toileting: provide frquent toileting  Volume/Electrolyte Status: ensure patient remains hydrated  Communication/Sensory: update plan of care on whiteboard  Behavioral: not applicable  Mobility: utilize bed/chair fall alarm and ensure bed is locked and in lowest position

## 2017-09-18 NOTE — DISCHARGE PLANNING
Call from Rose Marie Loera with [facility] [place], patient can be accepted today and they have obtained Auth from Insurance.    PHYLLIS Kiran has been notified via phone.

## 2017-09-18 NOTE — DISCHARGE PLANNING
Transportation request from PHYLLIS Kiran has been received for patient to transfer to [facility]. Call placed to Rose Marie Loera with [facility] and transport time was arranged for 1830    PHYLLIS Kiran has been notified via phone

## 2017-09-18 NOTE — CARE PLAN
Problem: Venous Thromboembolism (VTW)/Deep Vein Thrombosis (DVT) Prevention:  Goal: Patient will participate in Venous Thrombosis (VTE)/Deep Vein Thrombosis (DVT)Prevention Measures  Outcome: PROGRESSING AS EXPECTED  Pt on lovenox and SCDs    Problem: Pain Management  Goal: Pain level will decrease to patient's comfort goal  Outcome: PROGRESSING AS EXPECTED  Pt verbalizes discomfort, relieved by repositioning.     Problem: Skin Integrity  Goal: Risk for impaired skin integrity will decrease  Outcome: PROGRESSING AS EXPECTED  Turn Q2, barrier cream, will monitor.     Problem: Urinary Elimination:  Goal: Ability to reestablish a normal urinary elimination pattern will improve  Outcome: PROGRESSING AS EXPECTED  Pt incontinent PTA

## 2017-09-18 NOTE — PROGRESS NOTES
Assumed care of patient at this time. Bedside report received from night RN. Pt resting comfortably in bed. Denies needs or concerns. Bed alarm in place, call light in reach. Will monitor.

## 2017-09-18 NOTE — CARE PLAN
Problem: Skin Integrity  Goal: Risk for impaired skin integrity will decrease    Intervention: Implement precautions to protect skin integrity in collaboration with the interdisciplinary team  Pillows in use for positioning. Q2 turns in place. Barrier cream used.

## 2017-09-19 NOTE — PROGRESS NOTES
Pt discharged to [facility]. Pt transported by [facility] transport Nelia Velasquez CNA. Confirmed by work ID and drivers license.

## 2017-09-19 NOTE — DISCHARGE PLANNING
PHYLLIS notified pt's nurse that pt leaving at 18:30 and handed her transfer packet. SW called pt's daughter and left vm message. Per pt's nurse, she spoke with pt's daughter earlier today. SW requested nurse to contact pt's daughter again to let her know that pt will d/c to [facility] at 18:30. Nurse replied she will contact her.      Nurse to report to [phone].

## 2017-09-19 NOTE — DISCHARGE SUMMARY
PLEASE NOTE: THIS MAY BE A DUPLICATE NOTE. THERE HAVE BEEN SOME ISSUES WITH ACCESSIBILITY TO THIS NOTE, SO IT HAS BEEN RESUBMITTED.                 Internal Medicine Discharge Summary        Admit Date:  9/8/2017       Discharge Date:   9/18/2017     Service:   [facility] Internal Medicine Green Team  Attending Physician(s):   Dr. Capps     Senior Resident(s):   Dr. Beyer  Benigno Resident(s):   Dr. Hunter        Primary Diagnosis:   CVA     Secondary Diagnoses:                  Fall from ground level POA: Yes    Paroxysmal a-fib (CMS-HCC) POA: Unknown    Weakness generalized POA: Yes    Falls frequently POA: Yes    Hypertension POA: Yes    Aortic aneurysm (CMS-HCC) POA: Unknown    Elevated troponin POA: Unknown      Overview: Pt had elevations in troponins from 9/11-9/12 that have begun to trend       down(0.13, 1.61, 1.64, 1.05). She had ST depressions seen on EKG on 9/11.       Questionable history of CP in the ICU. Patient may have had an NSTEMI. She       currently denies any CP/SOB.      - continue ASA, statin, b-blocker  Resolved Problems:    Urinary tract infection POA: Yes    Dehydration POA: Yes    anion gap metabolic acidosis POA: Yes     Hospital Summary (Brief Narrative):       85 y.o. female with PMH aortic dissection s/p repair, hypothyroidism, femoral fracture s/p internal fixation, osteopenia and recent diagnosis of UTI presented to the ED on 9/8 s/p ground level fall at home with R hip pain. Per patient and family, she has frequent falls (3-4) during the last few months, seems to be mechanical without associated dizziness, palpitations or syncope. Family notes poor oral intake and gait instability on ambulation, with most recent incident earlier on day of presentation, where patient was trying to sit on a chair and slipped out of chair and fell to ground on her right side. No reported head trauma, confusion, seizures or syncope.   Of note, patient was confused few days prior to presentation,  went to , was diagnosed with urinary tract infection and started on Cefdinir (initial dose was on 9/7).     In the emergency department an IV was established. The patient was placed on a cardiac monitor.She had received intravenous fentanyl from the EMS crew prior to arrival and required oxygen by nasal cannula thereafter. The patient was given a dose of ceftriaxone and home dose cefdinir was resumed.  ray pelvis showed no pelvic fracture,  X Ray femur showed no fracture. She was started on IVF. UA showed +leukocyte esterase. She was found to have an AG of 13, with Na and K WNL. CXR showed dilation of thoracic aortic arch aneurysm consistent with h/o aortic dissection. EKG showed NSR with non-specific ST/T changes with LVH and a HR of 64.      On 9/9, it was reported that CT head from July at [facility] showed an incidental finding of 1.7 cm mass, likely meningioma. A CT head w/o contrast was ordered on 9/10 to evaluate any changes in meningioma and reported no change.     On 9/11 a rapid response was called at 1:35 am for chest pain. Evaluated at bedside, patient had an acute change in her mental status. As per chart review, patient had been noted to have gradually worsening mental status in the last few months. However, as per RN patient was able to hold a conversation. CT head was negative for hemorrhagic stroke and EKG showed new onset A. Fib with new ST depression in anterior leads. Serial troponins were elevated and peaked around 1.6. Transferred to ICU for further management and close observation. Heparin drip was started in the ICU. Pt was continued on IV ceftriaxone for UTI.  An echocardiogram showed an ejection fraction of 40%   with moderate-to-severe aortic insufficiency. Neuro was consulted to r/o stroke vs. TIA. As per neurology, she was thought to have   a transient ischemic attack with no residual deficits or findings on MRI.  This was likely embolic in nature due to the new onset atrial  fibrillation. Aspirin was continued. PT/OT and palliative care was consulted. The patient returned to NSR.      On 9/12 pt returned to medical floor. Pt was in no distress. Medical management of a suspected type II MI resumed with a BB, ARB, ASA, statin. Heparin drip for Presumed cardioembolic TIA was continued. Conversation regarding long term anticoagulation was started. Antbiotic course for UTI was completed.      On 9/13 Heparin drip was stopped. Pt and daughter made the decision not to anticoagulate after discussion of risks vs. Benefits.      Since 9/14, no acute events have occurred. SNF options discussed with patient.      Pt is now medically clear for discharge to [facility]     Consultants:     Neurology     Procedures:        None     Imaging/ Testing:      ECHOCARDIOGRAM COMP W/O CONT   Final Result       MR-BRAIN-WITH & W/O   Final Result       1.  No acute infarct.   2.  There is an approximately 1.9 cm sized enhancing extra-axial lesion in the anterior interhemispheric fissure likely representing an incidental meningioma.   3.  Moderate chronic microvascular ischemic disease.   4.  Moderate cerebral atrophy.       CT-CTA NECK WITH & W/O-POST PROCESSING   Final Result       1.  Type B dissection of the aorta, partially visualized. Surgical clips adjacent to the aorta and aortic appearance suggests prior surgical repair.       2.  Atherosclerosis. No significant stenosis is identified in the carotid arteries       3.  Small bilateral pleural effusions with adjacent atelectasis.           Comment: Results discussed with Dr. Hooker at 3:17 AM       DX-CHEST-PORTABLE (1 VIEW)   Final Result       1.  Interval development in mild interstitial edema.       2.  Prominence of the cardiac silhouette and mediastinal contours may be related to patient rotation and portable AP technique.       CT-CTA HEAD WITH & W/O-POST PROCESS   Final Result       1.  No acute thrombus or aneurysm identified.       2.   Atherosclerosis.       CT-HEAD W/O   Final Result       1.  No acute intracranial findings.       2.  Diffuse atrophy and periventricular white matter changes, consistent with chronic small vessel disease.       3.  Hyperdense extra-axial masses in the vertex and left posterior fossa likely represent meningiomas.           CT-HEAD W/O   Final Result       1.  No evidence of acute intracranial process.       2.  Cerebral atrophy as well as periventricular chronic small vessel ischemic change.       3.  18 mm extra-axial mass present at the midline within the frontal region along the level of the falx. This has an appearance most consistent with meningioma.       DX-PELVIS-1 OR 2 VIEWS   Final Result       1.  Internal fixation of RIGHT proximal femur.   2.  Diffuse osteopenia.   3.  No pelvic fracture.       DX-FEMUR-1 VIEW RIGHT   Final Result       1.  No fracture of the RIGHT femur.   2.  Prior internal fixation of proximal RIGHT femur.   3.  Moderate to severe degenerative change of RIGHT hip and knee.       DX-CHEST-LIMITED (1 VIEW)   Final Result       1.  There is aneurysmal dilatation of the thoracic aortic arch, probably chronic.   2.  Possible right mid hemithorax lung nodule.   3.  There are surgical clips in the right axilla.             Discharge Medications:         Medication Reconciliation: Completed             Medication List       ASK your doctor about these medications      Instructions   aspirin 81 MG tablet Take 81 mg by mouth every evening.  Dose:  81 mg   atorvastatin 20 MG Tabs  Commonly known as:  LIPITOR Take 20 mg by mouth every evening.  Dose:  20 mg   cefdinir 300 MG Caps  Commonly known as:  OMNICEF  Ask about: Should I take this medication? Take 1 Cap by mouth every 12 hours for 7 days.  Dose:  300 mg   CRANBERRY PLUS VITAMIN C 4200-20-3 MG-MG-UNIT Caps  Generic drug:  Cranberry-Vitamin C-Vitamin E Take 2 Caps by mouth 2 Times a Day.  Dose:  2 Cap   Iron 240 (27 Fe) MG Tabs Take 1 Tab  by mouth every evening.  Dose:  1 Tab   levothyroxine 100 MCG Tabs  Commonly known as:  SYNTHROID Take 100 mcg by mouth Every morning on an empty stomach.  Dose:  100 mcg   Melatonin 10 MG Caps Take 1 Cap by mouth every bedtime.  Dose:  1 Cap   metoprolol 200 MG XL tablet  Commonly known as:  TOPROL-XL Take 100 mg by mouth every evening.  Dose:  100 mg   MULTIVITAMIN GUMMIES WOMENS Chew Take 1 Tab by mouth every morning.  Dose:  1 Tab   OMEGA-3 PO Take 1 Cap by mouth every evening.  Dose:  1 Cap   SUPER B COMPLEX PO Take 1 Tab by mouth every evening.  Dose:  1 Tab   valsartan-hydrochlorothiazide 160-12.5 MG per tablet  Commonly known as:  DIOVAN-HCT Take 1 Tab by mouth every morning.  Dose:  1 Tab   vitamin D 1000 UNIT Tabs  Commonly known as:  cholecalciferol Take 1,000 Units by mouth every morning.  Dose:  1000 Units             Can use .DISCHARGEMEDSLIST if going to another facility           Disposition:   SNF     Diet:   Dysphagia      Activity:   As tolerated    Instructions:         The patient was instructed to return to the ER in the event of worsening symptoms. I have counseled the patient on the importance of compliance and the patient has agreed to proceed with all medical recommendations and follow up plan indicated above.   The patient understands that all medications come with benefits and risks. Risks may include permanent injury or death and these risks can be minimized with close reassessment and monitoring.         Follow up appointment details :      Follow up with PMD     Pending Studies:        None     Time spent on discharge day patient visit, preparing discharge paperwork and arranging for patient follow up.     Summary of follow up issues:   None     Discharge Time (Minutes) :    60           Condition on Discharge    ______________________________________________________________________     Interval history/exam for day of discharge:     Stable     Vitals          Vitals:     09/17/17 2307  09/18/17 0306 09/18/17 0758 09/18/17 1143   BP: 155/61 153/61 122/67 135/64   Pulse: 74 67 77 75   Resp: 15 17 16 16   Temp: 36.1 °C (97 °F) 36.1 °C (96.9 °F) 36.3 °C (97.4 °F) 36.7 °C (98 °F)   SpO2: 94% 94% 94% 96%   Weight:           Height:                 Weight/BMI: Body mass index is 23 kg/m².  Pulse Oximetry: 96 %, O2 (LPM): 0, O2 Delivery: None (Room Air)     General: NAD  CVS: RRR. No m/r/g  PULM: CTAB        Most Recent Labs:          Lab Results   Component Value Date/Time     WBC 10.3 09/11/2017 01:49 AM     RBC 4.91 09/11/2017 01:49 AM     HEMOGLOBIN 14.2 09/11/2017 01:49 AM     HEMATOCRIT 41.5 09/11/2017 01:49 AM     MCV 84.5 09/11/2017 01:49 AM     MCH 28.9 09/11/2017 01:49 AM     MCHC 34.2 09/11/2017 01:49 AM     MPV 10.2 09/11/2017 01:49 AM     NEUTSPOLYS 66.00 09/11/2017 01:49 AM     LYMPHOCYTES 23.70 09/11/2017 01:49 AM     MONOCYTES 7.00 09/11/2017 01:49 AM     EOSINOPHILS 2.30 09/11/2017 01:49 AM     BASOPHILS 0.60 09/11/2017 01:49 AM            Lab Results   Component Value Date/Time     SODIUM 134 (L) 09/18/2017 05:01 AM     POTASSIUM 3.9 09/18/2017 05:01 AM     CHLORIDE 105 09/18/2017 05:01 AM     CO2 20 09/18/2017 05:01 AM     GLUCOSE 86 09/18/2017 05:01 AM     BUN 7 (L) 09/18/2017 05:01 AM     CREATININE 0.44 (L) 09/18/2017 05:01 AM            Lab Results   Component Value Date/Time     ALTSGPT 10 09/11/2017 01:48 AM     ASTSGOT 16 09/11/2017 01:48 AM     ALKPHOSPHAT 82 09/11/2017 01:48 AM     TBILIRUBIN 4.1 (H) 09/11/2017 01:48 AM     ALBUMIN 3.4 09/11/2017 01:48 AM     GLOBULIN 2.4 09/11/2017 01:48 AM     INR 1.22 (H) 09/08/2017 06:21 PM            Lab Results   Component Value Date/Time     PROTHROMBTM 15.8 (H) 09/08/2017 06:21 PM     INR 1.22 (H) 09/08/2017 06:21 PM

## 2017-11-02 ENCOUNTER — OFFICE VISIT (OUTPATIENT)
Dept: URGENT CARE | Facility: PHYSICIAN GROUP | Age: 82
End: 2017-11-02
Payer: COMMERCIAL

## 2017-11-02 VITALS
RESPIRATION RATE: 20 BRPM | OXYGEN SATURATION: 95 % | WEIGHT: 130 LBS | BODY MASS INDEX: 23.03 KG/M2 | HEART RATE: 100 BPM | SYSTOLIC BLOOD PRESSURE: 112 MMHG | TEMPERATURE: 97.6 F | DIASTOLIC BLOOD PRESSURE: 62 MMHG

## 2017-11-02 DIAGNOSIS — K59.00 CONSTIPATION, UNSPECIFIED CONSTIPATION TYPE: ICD-10-CM

## 2017-11-02 PROCEDURE — 99214 OFFICE O/P EST MOD 30 MIN: CPT | Performed by: EMERGENCY MEDICINE

## 2017-11-02 ASSESSMENT — ENCOUNTER SYMPTOMS
MYALGIAS: 0
FEVER: 0
CHILLS: 0
SPEECH CHANGE: 0
NAUSEA: 0
SENSORY CHANGE: 0
NECK PAIN: 0
CONSTIPATION: 1
ABDOMINAL PAIN: 0
DIARRHEA: 0
VOMITING: 0

## 2017-11-02 NOTE — PROGRESS NOTES
Subjective:      Aurora Farias is a 86 y.o. female who presents with Constipation (no BM x 2 days, having rectal pain )            HPI patient is a pleasant 86-year-old female complaining of no bowel movement for the past 2 days. According to her daughter she has a large mass of stool at the anal sphincter but cannot pass. Patient denies any fever chills nausea vomiting diarrhea.  Allergies   Allergen Reactions   • Penicillins      Social History     Social History   • Marital status:      Spouse name: N/A   • Number of children: N/A   • Years of education: N/A     Occupational History   • Not on file.     Social History Main Topics   • Smoking status: Never Smoker   • Smokeless tobacco: Never Used   • Alcohol use No   • Drug use: No   • Sexual activity: Not on file     Other Topics Concern   • Not on file     Social History Narrative   • No narrative on file     Past Medical History:   Diagnosis Date   • Aortic aneurysm without rupture (CMS-HCC)     also 2nd AA   • Hypertension    • Thyroid disorder      Current Outpatient Prescriptions on File Prior to Visit   Medication Sig Dispense Refill   • valsartan (DIOVAN) 160 MG Tab Take 1 Tab by mouth every day. 30 Tab 3   • metoprolol (TOPROL-XL) 200 MG XL tablet Take 100 mg by mouth every evening.     • aspirin 81 MG tablet Take 81 mg by mouth every evening.     • Cranberry-Vitamin C-Vitamin E (CRANBERRY PLUS VITAMIN C) 4200-20-3 MG-MG-UNIT Cap Take 2 Caps by mouth 2 Times a Day.     • Ferrous Gluconate (IRON) 240 (27 Fe) MG Tab Take 1 Tab by mouth every evening.     • B Complex-C (SUPER B COMPLEX PO) Take 1 Tab by mouth every evening.     • Omega-3 Fatty Acids (OMEGA-3 PO) Take 1 Cap by mouth every evening.     • levothyroxine (SYNTHROID) 100 MCG Tab Take 100 mcg by mouth Every morning on an empty stomach.     • Multiple Vitamins-Minerals (MULTIVITAMIN GUMMIES WOMENS) Chew Tab Take 1 Tab by mouth every morning.     • Melatonin 10 MG Cap Take 1 Cap by mouth  every bedtime.     • vitamin D (CHOLECALCIFEROL) 1000 UNIT Tab Take 1,000 Units by mouth every morning.     • atorvastatin (LIPITOR) 20 MG Tab Take 20 mg by mouth every evening.       No current facility-administered medications on file prior to visit.    History reviewed. No pertinent family history.  Review of Systems   Constitutional: Negative for chills and fever.   Cardiovascular: Negative for chest pain.   Gastrointestinal: Positive for constipation. Negative for abdominal pain, diarrhea, nausea and vomiting.   Musculoskeletal: Negative for myalgias and neck pain.   Skin: Negative for rash.   Neurological: Negative for sensory change and speech change.          Objective:     /62   Pulse 100   Temp 36.4 °C (97.6 °F)   Resp 20   Wt 59 kg (130 lb) Comment: Wheelchair only  SpO2 95%   BMI 23.03 kg/m²      Physical Exam   Constitutional: She appears well-developed and well-nourished. No distress.   HENT:   Head: Normocephalic and atraumatic.   Right Ear: External ear normal.   Left Ear: External ear normal.   Neck: Normal range of motion.   Cardiovascular: Normal rate and regular rhythm.    Pulmonary/Chest: Effort normal and breath sounds normal.   Genitourinary:   Genitourinary Comments: Rectal exam patient has a 8 cm rectal mass ( stool) that was easily disimpacted and a 20-30 small 1 cm balls of hard stool.   Neurological: She is alert.   Skin: Skin is warm and dry. She is not diaphoretic. No erythema.               Assessment/Plan:     Diagnosis rectal impaction    I have been able to get the patient's bowels started with manual disimpaction. The patient's daughter will stop and get a fleets enema. When given 3 checks to use to protect their car/bed during the procedure. In the future the patient will decrease the amount of cheese she eats and increase hydration plus psyllium seed supplement in the form of Perdiem Citrucel or Metamucil.

## 2017-12-06 ENCOUNTER — APPOINTMENT (OUTPATIENT)
Dept: RADIOLOGY | Facility: IMAGING CENTER | Age: 82
End: 2017-12-06
Attending: PHYSICIAN ASSISTANT
Payer: COMMERCIAL

## 2017-12-06 ENCOUNTER — OFFICE VISIT (OUTPATIENT)
Dept: URGENT CARE | Facility: CLINIC | Age: 82
End: 2017-12-06
Payer: COMMERCIAL

## 2017-12-06 VITALS
TEMPERATURE: 98.6 F | HEART RATE: 84 BPM | WEIGHT: 130 LBS | SYSTOLIC BLOOD PRESSURE: 120 MMHG | BODY MASS INDEX: 23.03 KG/M2 | OXYGEN SATURATION: 96 % | RESPIRATION RATE: 14 BRPM | DIASTOLIC BLOOD PRESSURE: 74 MMHG

## 2017-12-06 DIAGNOSIS — J04.0 LARYNGITIS: ICD-10-CM

## 2017-12-06 PROCEDURE — 99214 OFFICE O/P EST MOD 30 MIN: CPT | Performed by: PHYSICIAN ASSISTANT

## 2017-12-06 PROCEDURE — 71010 DX-CHEST-PORTABLE (1 VIEW): CPT | Mod: TC | Performed by: PHYSICIAN ASSISTANT

## 2017-12-06 ASSESSMENT — ENCOUNTER SYMPTOMS
SINUS PAIN: 0
STRIDOR: 0
FEVER: 0
MYALGIAS: 0
COUGH: 1
VOMITING: 0
FLANK PAIN: 0
PALPITATIONS: 0
CONSTIPATION: 0
SORE THROAT: 0
NAUSEA: 0

## 2017-12-06 NOTE — PROGRESS NOTES
Subjective:      Aurora Farias is a 86 y.o. female who presents with Other (scratchy voice x 3 days .)            Subjective: Patient is an 86-year-old female who is brought in by daughter. Patient lives in assisted living and is wheelchair bound. Daughter brings her in stating that assisted living called her to state that she has signs of laryngitis. More of a hoarse sounding voice. Denies significant cough but has had slight cough over the last few days. No runny nose or congestion. Denies fever. Denies altered mental state more so than baseline. Baseline is already limited in terms of patient having a prior TIA and also dementia. Daughter denies any complaint of burning with urination but notes she does P frequently. Denies increased urge or hesitancy.    Past Medical History:  No date: Aortic aneurysm without rupture (CMS-HCC)      Comment: also 2nd AA  No date: Hypertension  No date: Thyroid disorder  Past Surgical History:  No date: OTHER CARDIAC SURGERY      Comment: aneurysm repair    Current Outpatient Prescriptions:   •  valsartan (DIOVAN) 160 MG Tab, Take 1 Tab by mouth every day., Disp: 30 Tab, Rfl: 3  •  metoprolol (TOPROL-XL) 200 MG XL tablet, Take 100 mg by mouth every evening., Disp: , Rfl:   •  aspirin 81 MG tablet, Take 81 mg by mouth every evening., Disp: , Rfl:   •  B Complex-C (SUPER B COMPLEX PO), Take 1 Tab by mouth every evening., Disp: , Rfl:   •  levothyroxine (SYNTHROID) 100 MCG Tab, Take 100 mcg by mouth Every morning on an empty stomach., Disp: , Rfl:   •  Multiple Vitamins-Minerals (MULTIVITAMIN GUMMIES WOMENS) Chew Tab, Take 1 Tab by mouth every morning., Disp: , Rfl:   •  vitamin D (CHOLECALCIFEROL) 1000 UNIT Tab, Take 1,000 Units by mouth every morning., Disp: , Rfl:   •  atorvastatin (LIPITOR) 20 MG Tab, Take 20 mg by mouth every evening., Disp: , Rfl:   •  Cranberry-Vitamin C-Vitamin E (CRANBERRY PLUS VITAMIN C) 4200-20-3 MG-MG-UNIT Cap, Take 2 Caps by mouth 2 Times a Day.,  Disp: , Rfl:   •  Ferrous Gluconate (IRON) 240 (27 Fe) MG Tab, Take 1 Tab by mouth every evening., Disp: , Rfl:   •  Omega-3 Fatty Acids (OMEGA-3 PO), Take 1 Cap by mouth every evening., Disp: , Rfl:   •  Melatonin 10 MG Cap, Take 1 Cap by mouth every bedtime., Disp: , Rfl:   Penicillins          Review of Systems   Constitutional: Negative for fever.   HENT: Negative for congestion, sinus pain and sore throat.    Respiratory: Positive for cough. Negative for stridor.    Cardiovascular: Negative for palpitations.   Gastrointestinal: Negative for constipation, nausea and vomiting.   Genitourinary: Positive for frequency. Negative for dysuria, flank pain and hematuria.   Musculoskeletal: Negative for myalgias.   Skin: Negative for rash.          Objective:     /74   Pulse 84 Comment: irregular  Temp 37 °C (98.6 °F)   Resp 14   Wt 59 kg (130 lb)   SpO2 96%   BMI 23.03 kg/m²      Physical Exam     patient is elderly and slowly responsive to commands. She is not toxic, but not very verbal. Again daughter states this is her normal baseline    HEENT: Is unremarkable  Neck soft supple without cervical  Cardiac: Regular rate and rhythm  Lungs: Have decreased breath sounds this patient does not take deep breaths even with commands  Abdomen soft nontender nondistended with good bowel sounds  Skin is well perfused without evidence of rash or lesion    Urgent care course: Urinalysis is pending and chest x-ray is negative for acute cardiopulmonary process        Assessment/Plan:     1. Laryngitis/uri  Discussed likely viral etiology. Supportive care measures. Follow up with primary care  - DX-CHEST-2 VIEWS; Future  - POCT Urinalysis    2. Patient unable to urinate. Discussed doing Urinalysis at her [facility]. Patient is not symptomatic for UTI but daughter is requesting. Given her age this is a good idea. I will put in an order for her to get done at the lab

## 2018-09-06 ENCOUNTER — APPOINTMENT (OUTPATIENT)
Dept: RADIOLOGY | Facility: MEDICAL CENTER | Age: 83
End: 2018-09-06
Attending: EMERGENCY MEDICINE
Payer: COMMERCIAL

## 2018-09-06 ENCOUNTER — HOSPITAL ENCOUNTER (OUTPATIENT)
Facility: MEDICAL CENTER | Age: 83
End: 2018-09-11
Attending: EMERGENCY MEDICINE | Admitting: INTERNAL MEDICINE
Payer: COMMERCIAL

## 2018-09-06 DIAGNOSIS — R53.81 MALAISE AND FATIGUE: ICD-10-CM

## 2018-09-06 DIAGNOSIS — R53.83 MALAISE AND FATIGUE: ICD-10-CM

## 2018-09-06 DIAGNOSIS — R53.1 GENERALIZED WEAKNESS: ICD-10-CM

## 2018-09-06 DIAGNOSIS — I63.9 CEREBROVASCULAR ACCIDENT (CVA), UNSPECIFIED MECHANISM (HCC): ICD-10-CM

## 2018-09-06 DIAGNOSIS — N39.0 ACUTE UTI: ICD-10-CM

## 2018-09-06 DIAGNOSIS — N30.00 ACUTE CYSTITIS WITHOUT HEMATURIA: ICD-10-CM

## 2018-09-06 DIAGNOSIS — I71.20 THORACIC AORTIC ANEURYSM WITHOUT RUPTURE (HCC): ICD-10-CM

## 2018-09-06 LAB
ALBUMIN SERPL BCP-MCNC: 2.8 G/DL (ref 3.2–4.9)
ALBUMIN/GLOB SERPL: 1.1 G/DL
ALP SERPL-CCNC: 65 U/L (ref 30–99)
ALT SERPL-CCNC: 10 U/L (ref 2–50)
ANION GAP SERPL CALC-SCNC: 10 MMOL/L (ref 0–11.9)
APPEARANCE UR: ABNORMAL
AST SERPL-CCNC: 18 U/L (ref 12–45)
BACTERIA #/AREA URNS HPF: ABNORMAL /HPF
BASOPHILS # BLD AUTO: 0.3 % (ref 0–1.8)
BASOPHILS # BLD: 0.03 K/UL (ref 0–0.12)
BILIRUB SERPL-MCNC: 2 MG/DL (ref 0.1–1.5)
BILIRUB UR QL STRIP.AUTO: NEGATIVE
BUN SERPL-MCNC: 24 MG/DL (ref 8–22)
CALCIUM SERPL-MCNC: 8.9 MG/DL (ref 8.5–10.5)
CHLORIDE SERPL-SCNC: 115 MMOL/L (ref 96–112)
CO2 SERPL-SCNC: 20 MMOL/L (ref 20–33)
COLOR UR: YELLOW
CREAT SERPL-MCNC: 0.63 MG/DL (ref 0.5–1.4)
EOSINOPHIL # BLD AUTO: 0.09 K/UL (ref 0–0.51)
EOSINOPHIL NFR BLD: 0.8 % (ref 0–6.9)
EPI CELLS #/AREA URNS HPF: ABNORMAL /HPF
ERYTHROCYTE [DISTWIDTH] IN BLOOD BY AUTOMATED COUNT: 48.7 FL (ref 35.9–50)
GLOBULIN SER CALC-MCNC: 2.6 G/DL (ref 1.9–3.5)
GLUCOSE SERPL-MCNC: 143 MG/DL (ref 65–99)
GLUCOSE UR STRIP.AUTO-MCNC: NEGATIVE MG/DL
HCT VFR BLD AUTO: 45.3 % (ref 37–47)
HGB BLD-MCNC: 14.2 G/DL (ref 12–16)
HYALINE CASTS #/AREA URNS LPF: ABNORMAL /LPF
IMM GRANULOCYTES # BLD AUTO: 0.06 K/UL (ref 0–0.11)
IMM GRANULOCYTES NFR BLD AUTO: 0.5 % (ref 0–0.9)
KETONES UR STRIP.AUTO-MCNC: NEGATIVE MG/DL
LACTATE BLD-SCNC: 2 MMOL/L (ref 0.5–2)
LEUKOCYTE ESTERASE UR QL STRIP.AUTO: ABNORMAL
LYMPHOCYTES # BLD AUTO: 1.9 K/UL (ref 1–4.8)
LYMPHOCYTES NFR BLD: 16.3 % (ref 22–41)
MCH RBC QN AUTO: 28.1 PG (ref 27–33)
MCHC RBC AUTO-ENTMCNC: 31.3 G/DL (ref 33.6–35)
MCV RBC AUTO: 89.5 FL (ref 81.4–97.8)
MICRO URNS: ABNORMAL
MONOCYTES # BLD AUTO: 1.09 K/UL (ref 0–0.85)
MONOCYTES NFR BLD AUTO: 9.3 % (ref 0–13.4)
NEUTROPHILS # BLD AUTO: 8.49 K/UL (ref 2–7.15)
NEUTROPHILS NFR BLD: 72.8 % (ref 44–72)
NITRITE UR QL STRIP.AUTO: POSITIVE
NRBC # BLD AUTO: 0 K/UL
NRBC BLD-RTO: 0 /100 WBC
PH UR STRIP.AUTO: 7.5 [PH]
PLATELET # BLD AUTO: 166 K/UL (ref 164–446)
PMV BLD AUTO: 10.3 FL (ref 9–12.9)
POTASSIUM SERPL-SCNC: 3.4 MMOL/L (ref 3.6–5.5)
PROT SERPL-MCNC: 5.4 G/DL (ref 6–8.2)
PROT UR QL STRIP: 100 MG/DL
RBC # BLD AUTO: 5.06 M/UL (ref 4.2–5.4)
RBC # URNS HPF: ABNORMAL /HPF
RBC UR QL AUTO: ABNORMAL
SODIUM SERPL-SCNC: 145 MMOL/L (ref 135–145)
SP GR UR STRIP.AUTO: 1.02
T4 FREE SERPL-MCNC: 1.55 NG/DL (ref 0.53–1.43)
TROPONIN I SERPL-MCNC: 0.01 NG/ML (ref 0–0.04)
TSH SERPL DL<=0.005 MIU/L-ACNC: 0.59 UIU/ML (ref 0.38–5.33)
UROBILINOGEN UR STRIP.AUTO-MCNC: 1 MG/DL
WBC # BLD AUTO: 11.7 K/UL (ref 4.8–10.8)
WBC #/AREA URNS HPF: ABNORMAL /HPF

## 2018-09-06 PROCEDURE — 87086 URINE CULTURE/COLONY COUNT: CPT

## 2018-09-06 PROCEDURE — 94760 N-INVAS EAR/PLS OXIMETRY 1: CPT

## 2018-09-06 PROCEDURE — 87040 BLOOD CULTURE FOR BACTERIA: CPT

## 2018-09-06 PROCEDURE — 99285 EMERGENCY DEPT VISIT HI MDM: CPT

## 2018-09-06 PROCEDURE — 84443 ASSAY THYROID STIM HORMONE: CPT

## 2018-09-06 PROCEDURE — 93005 ELECTROCARDIOGRAM TRACING: CPT | Performed by: EMERGENCY MEDICINE

## 2018-09-06 PROCEDURE — 80053 COMPREHEN METABOLIC PANEL: CPT

## 2018-09-06 PROCEDURE — 84484 ASSAY OF TROPONIN QUANT: CPT

## 2018-09-06 PROCEDURE — 70450 CT HEAD/BRAIN W/O DYE: CPT

## 2018-09-06 PROCEDURE — 81001 URINALYSIS AUTO W/SCOPE: CPT

## 2018-09-06 PROCEDURE — 71045 X-RAY EXAM CHEST 1 VIEW: CPT

## 2018-09-06 PROCEDURE — 87186 SC STD MICRODIL/AGAR DIL: CPT

## 2018-09-06 PROCEDURE — 85025 COMPLETE CBC W/AUTO DIFF WBC: CPT

## 2018-09-06 PROCEDURE — 83735 ASSAY OF MAGNESIUM: CPT

## 2018-09-06 PROCEDURE — 87077 CULTURE AEROBIC IDENTIFY: CPT

## 2018-09-06 PROCEDURE — 84439 ASSAY OF FREE THYROXINE: CPT

## 2018-09-06 PROCEDURE — 83605 ASSAY OF LACTIC ACID: CPT

## 2018-09-06 RX ORDER — POLYETHYLENE GLYCOL 3350 17 G/17G
17 POWDER, FOR SOLUTION ORAL
Status: ON HOLD | COMMUNITY
End: 2020-02-12

## 2018-09-06 RX ORDER — M-VIT,TX,IRON,MINS/CALC/FOLIC 27MG-0.4MG
1 TABLET ORAL EVERY MORNING
Status: ON HOLD | COMMUNITY
End: 2020-02-12

## 2018-09-06 RX ORDER — VALSARTAN AND HYDROCHLOROTHIAZIDE 160; 12.5 MG/1; MG/1
0.5 TABLET, FILM COATED ORAL EVERY MORNING
Status: ON HOLD | COMMUNITY
End: 2020-02-12

## 2018-09-06 ASSESSMENT — PAIN SCALES - GENERAL: PAINLEVEL_OUTOF10: 0

## 2018-09-07 PROBLEM — E87.6 HYPOKALEMIA: Status: ACTIVE | Noted: 2018-09-07

## 2018-09-07 PROBLEM — H02.402 PTOSIS OF EYELID, LEFT: Status: ACTIVE | Noted: 2018-09-07

## 2018-09-07 PROBLEM — R00.1 BRADYCARDIA: Status: ACTIVE | Noted: 2018-09-07

## 2018-09-07 PROBLEM — E03.9 HYPOTHYROIDISM: Status: ACTIVE | Noted: 2018-09-07

## 2018-09-07 PROBLEM — D32.9 MENINGIOMA (HCC): Status: ACTIVE | Noted: 2018-09-07

## 2018-09-07 PROBLEM — E86.0 DEHYDRATION: Status: ACTIVE | Noted: 2018-09-07

## 2018-09-07 LAB
BASOPHILS # BLD AUTO: 0.4 % (ref 0–1.8)
BASOPHILS # BLD: 0.05 K/UL (ref 0–0.12)
EOSINOPHIL # BLD AUTO: 0.06 K/UL (ref 0–0.51)
EOSINOPHIL NFR BLD: 0.4 % (ref 0–6.9)
ERYTHROCYTE [DISTWIDTH] IN BLOOD BY AUTOMATED COUNT: 47.6 FL (ref 35.9–50)
HCT VFR BLD AUTO: 43.9 % (ref 37–47)
HGB BLD-MCNC: 14.5 G/DL (ref 12–16)
IMM GRANULOCYTES # BLD AUTO: 0.13 K/UL (ref 0–0.11)
IMM GRANULOCYTES NFR BLD AUTO: 1 % (ref 0–0.9)
LYMPHOCYTES # BLD AUTO: 2.51 K/UL (ref 1–4.8)
LYMPHOCYTES NFR BLD: 18.5 % (ref 22–41)
MAGNESIUM SERPL-MCNC: 2.1 MG/DL (ref 1.5–2.5)
MCH RBC QN AUTO: 28.7 PG (ref 27–33)
MCHC RBC AUTO-ENTMCNC: 33 G/DL (ref 33.6–35)
MCV RBC AUTO: 86.8 FL (ref 81.4–97.8)
MONOCYTES # BLD AUTO: 1.11 K/UL (ref 0–0.85)
MONOCYTES NFR BLD AUTO: 8.2 % (ref 0–13.4)
NEUTROPHILS # BLD AUTO: 9.68 K/UL (ref 2–7.15)
NEUTROPHILS NFR BLD: 71.5 % (ref 44–72)
NRBC # BLD AUTO: 0 K/UL
NRBC BLD-RTO: 0 /100 WBC
PLATELET # BLD AUTO: 158 K/UL (ref 164–446)
PMV BLD AUTO: 11.1 FL (ref 9–12.9)
RBC # BLD AUTO: 5.06 M/UL (ref 4.2–5.4)
WBC # BLD AUTO: 13.5 K/UL (ref 4.8–10.8)

## 2018-09-07 PROCEDURE — 85025 COMPLETE CBC W/AUTO DIFF WBC: CPT

## 2018-09-07 PROCEDURE — 700105 HCHG RX REV CODE 258: Performed by: GENERAL PRACTICE

## 2018-09-07 PROCEDURE — 700105 HCHG RX REV CODE 258: Performed by: EMERGENCY MEDICINE

## 2018-09-07 PROCEDURE — G0378 HOSPITAL OBSERVATION PER HR: HCPCS

## 2018-09-07 PROCEDURE — 96365 THER/PROPH/DIAG IV INF INIT: CPT

## 2018-09-07 PROCEDURE — 36415 COLL VENOUS BLD VENIPUNCTURE: CPT

## 2018-09-07 PROCEDURE — 700111 HCHG RX REV CODE 636 W/ 250 OVERRIDE (IP): Performed by: EMERGENCY MEDICINE

## 2018-09-07 PROCEDURE — 96367 TX/PROPH/DG ADDL SEQ IV INF: CPT

## 2018-09-07 PROCEDURE — A9270 NON-COVERED ITEM OR SERVICE: HCPCS | Performed by: STUDENT IN AN ORGANIZED HEALTH CARE EDUCATION/TRAINING PROGRAM

## 2018-09-07 PROCEDURE — 700105 HCHG RX REV CODE 258: Performed by: STUDENT IN AN ORGANIZED HEALTH CARE EDUCATION/TRAINING PROGRAM

## 2018-09-07 PROCEDURE — 99220 PR INITIAL OBSERVATION CARE,LEVL III: CPT | Mod: GC | Performed by: INTERNAL MEDICINE

## 2018-09-07 PROCEDURE — 700111 HCHG RX REV CODE 636 W/ 250 OVERRIDE (IP): Performed by: STUDENT IN AN ORGANIZED HEALTH CARE EDUCATION/TRAINING PROGRAM

## 2018-09-07 PROCEDURE — 700102 HCHG RX REV CODE 250 W/ 637 OVERRIDE(OP): Performed by: STUDENT IN AN ORGANIZED HEALTH CARE EDUCATION/TRAINING PROGRAM

## 2018-09-07 RX ORDER — ENALAPRILAT 1.25 MG/ML
1.25 INJECTION INTRAVENOUS EVERY 6 HOURS PRN
Status: DISCONTINUED | OUTPATIENT
Start: 2018-09-07 | End: 2018-09-11 | Stop reason: HOSPADM

## 2018-09-07 RX ORDER — LEVOTHYROXINE SODIUM 0.1 MG/1
100 TABLET ORAL
Status: DISCONTINUED | OUTPATIENT
Start: 2018-09-07 | End: 2018-09-11 | Stop reason: HOSPADM

## 2018-09-07 RX ORDER — SODIUM CHLORIDE 9 MG/ML
INJECTION, SOLUTION INTRAVENOUS CONTINUOUS
Status: DISCONTINUED | OUTPATIENT
Start: 2018-09-07 | End: 2018-09-10

## 2018-09-07 RX ORDER — POTASSIUM CHLORIDE 7.45 MG/ML
10 INJECTION INTRAVENOUS
Status: COMPLETED | OUTPATIENT
Start: 2018-09-07 | End: 2018-09-07

## 2018-09-07 RX ORDER — M-VIT,TX,IRON,MINS/CALC/FOLIC 27MG-0.4MG
1 TABLET ORAL DAILY
Status: DISCONTINUED | OUTPATIENT
Start: 2018-09-07 | End: 2018-09-11 | Stop reason: HOSPADM

## 2018-09-07 RX ORDER — POLYETHYLENE GLYCOL 3350 17 G/17G
1 POWDER, FOR SOLUTION ORAL
Status: DISCONTINUED | OUTPATIENT
Start: 2018-09-07 | End: 2018-09-11 | Stop reason: HOSPADM

## 2018-09-07 RX ORDER — ACETAMINOPHEN 325 MG/1
650 TABLET ORAL EVERY 6 HOURS PRN
Status: DISCONTINUED | OUTPATIENT
Start: 2018-09-07 | End: 2018-09-11 | Stop reason: HOSPADM

## 2018-09-07 RX ORDER — AMOXICILLIN 250 MG
2 CAPSULE ORAL 2 TIMES DAILY
Status: DISCONTINUED | OUTPATIENT
Start: 2018-09-07 | End: 2018-09-11 | Stop reason: HOSPADM

## 2018-09-07 RX ORDER — BISACODYL 10 MG
10 SUPPOSITORY, RECTAL RECTAL
Status: DISCONTINUED | OUTPATIENT
Start: 2018-09-07 | End: 2018-09-11 | Stop reason: HOSPADM

## 2018-09-07 RX ORDER — ASPIRIN 81 MG/1
81 TABLET, CHEWABLE ORAL EVERY EVENING
Status: DISCONTINUED | OUTPATIENT
Start: 2018-09-07 | End: 2018-09-11 | Stop reason: HOSPADM

## 2018-09-07 RX ORDER — ATORVASTATIN CALCIUM 20 MG/1
20 TABLET, FILM COATED ORAL DAILY
Status: DISCONTINUED | OUTPATIENT
Start: 2018-09-07 | End: 2018-09-11 | Stop reason: HOSPADM

## 2018-09-07 RX ADMIN — VITAMIN D, TAB 1000IU (100/BT) 1000 UNITS: 25 TAB at 05:05

## 2018-09-07 RX ADMIN — ENOXAPARIN SODIUM 40 MG: 40 INJECTION SUBCUTANEOUS at 05:15

## 2018-09-07 RX ADMIN — ATORVASTATIN CALCIUM 20 MG: 20 TABLET, FILM COATED ORAL at 05:05

## 2018-09-07 RX ADMIN — POTASSIUM CHLORIDE 10 MEQ: 10 INJECTION, SOLUTION INTRAVENOUS at 05:00

## 2018-09-07 RX ADMIN — CEFTRIAXONE SODIUM 2 G: 2 INJECTION, POWDER, FOR SOLUTION INTRAMUSCULAR; INTRAVENOUS at 05:06

## 2018-09-07 RX ADMIN — SODIUM CHLORIDE: 9 INJECTION, SOLUTION INTRAVENOUS at 02:01

## 2018-09-07 RX ADMIN — SODIUM CHLORIDE: 9 INJECTION, SOLUTION INTRAVENOUS at 03:51

## 2018-09-07 RX ADMIN — POTASSIUM CHLORIDE 10 MEQ: 10 INJECTION, SOLUTION INTRAVENOUS at 03:30

## 2018-09-07 RX ADMIN — CEFTRIAXONE SODIUM 2 G: 2 INJECTION, POWDER, FOR SOLUTION INTRAMUSCULAR; INTRAVENOUS at 00:37

## 2018-09-07 RX ADMIN — SODIUM CHLORIDE: 9 INJECTION, SOLUTION INTRAVENOUS at 19:59

## 2018-09-07 RX ADMIN — LEVOTHYROXINE SODIUM 100 MCG: 100 TABLET ORAL at 05:05

## 2018-09-07 RX ADMIN — POTASSIUM CHLORIDE 10 MEQ: 10 INJECTION, SOLUTION INTRAVENOUS at 01:58

## 2018-09-07 RX ADMIN — ASPIRIN 81 MG: 81 TABLET, CHEWABLE ORAL at 17:27

## 2018-09-07 RX ADMIN — POTASSIUM CHLORIDE 10 MEQ: 10 INJECTION, SOLUTION INTRAVENOUS at 07:54

## 2018-09-07 RX ADMIN — MULTIPLE VITAMINS W/ MINERALS TAB 1 TABLET: TAB at 05:05

## 2018-09-07 ASSESSMENT — COGNITIVE AND FUNCTIONAL STATUS - GENERAL
STANDING UP FROM CHAIR USING ARMS: A LOT
HELP NEEDED FOR BATHING: A LOT
DRESSING REGULAR UPPER BODY CLOTHING: A LOT
EATING MEALS: A LOT
CLIMB 3 TO 5 STEPS WITH RAILING: TOTAL
DAILY ACTIVITIY SCORE: 12
SUGGESTED CMS G CODE MODIFIER DAILY ACTIVITY: CL
MOVING FROM LYING ON BACK TO SITTING ON SIDE OF FLAT BED: A LITTLE
MOBILITY SCORE: 14
WALKING IN HOSPITAL ROOM: A LOT
PERSONAL GROOMING: A LOT
SUGGESTED CMS G CODE MODIFIER MOBILITY: CL
DRESSING REGULAR LOWER BODY CLOTHING: A LOT
MOVING TO AND FROM BED TO CHAIR: A LITTLE
TURNING FROM BACK TO SIDE WHILE IN FLAT BAD: A LITTLE
TOILETING: A LOT

## 2018-09-07 ASSESSMENT — PAIN SCALES - GENERAL
PAINLEVEL_OUTOF10: 0

## 2018-09-07 ASSESSMENT — ENCOUNTER SYMPTOMS
DIAPHORESIS: 0
DEPRESSION: 0
WEIGHT LOSS: 0
CHILLS: 0
SORE THROAT: 0
DOUBLE VISION: 0
DIZZINESS: 0
BACK PAIN: 0
DIARRHEA: 0
WHEEZING: 0
MEMORY LOSS: 0
SENSORY CHANGE: 0
INSOMNIA: 0
VOMITING: 0
BLOOD IN STOOL: 0
FEVER: 0
FOCAL WEAKNESS: 1
TREMORS: 0
NERVOUS/ANXIOUS: 0
SEIZURES: 0
SHORTNESS OF BREATH: 0
TINGLING: 0
PND: 0
COUGH: 0
PALPITATIONS: 0
ABDOMINAL PAIN: 0
CONSTIPATION: 0
FALLS: 1
NAUSEA: 0
CLAUDICATION: 0
BRUISES/BLEEDS EASILY: 0
SPUTUM PRODUCTION: 0
WEAKNESS: 1
SINUS PAIN: 0
HEADACHES: 0
MYALGIAS: 0
SPEECH CHANGE: 0
HEARTBURN: 0
LOSS OF CONSCIOUSNESS: 0
ORTHOPNEA: 0
HALLUCINATIONS: 0
HEMOPTYSIS: 0
FLANK PAIN: 0
CONSTIPATION: 1
PHOTOPHOBIA: 0
WEAKNESS: 0
BLURRED VISION: 0
STRIDOR: 0

## 2018-09-07 ASSESSMENT — PATIENT HEALTH QUESTIONNAIRE - PHQ9
2. FEELING DOWN, DEPRESSED, IRRITABLE, OR HOPELESS: NOT AT ALL
1. LITTLE INTEREST OR PLEASURE IN DOING THINGS: NOT AT ALL
SUM OF ALL RESPONSES TO PHQ9 QUESTIONS 1 AND 2: 0

## 2018-09-07 ASSESSMENT — LIFESTYLE VARIABLES
ALCOHOL_USE: NO
SUBSTANCE_ABUSE: 0
EVER_SMOKED: NEVER

## 2018-09-07 ASSESSMENT — COPD QUESTIONNAIRES
DO YOU EVER COUGH UP ANY MUCUS OR PHLEGM?: NO/ONLY WITH OCCASIONAL COLDS OR INFECTIONS
IN THE PAST 12 MONTHS DO YOU DO LESS THAN YOU USED TO BECAUSE OF YOUR BREATHING PROBLEMS: DISAGREE/UNSURE
HAVE YOU SMOKED AT LEAST 100 CIGARETTES IN YOUR ENTIRE LIFE: NO/DON'T KNOW
DURING THE PAST 4 WEEKS HOW MUCH DID YOU FEEL SHORT OF BREATH: NONE/LITTLE OF THE TIME
COPD SCREENING SCORE: 2

## 2018-09-07 NOTE — PROGRESS NOTES
Patient transferred from ER to the floor with family member present. Patient AOx2 with no pain reported. Will continue to monitor.

## 2018-09-07 NOTE — ED TRIAGE NOTES
Chief Complaint   Patient presents with   • ALOC     ALOC noticed today by group home alone with dark colored urine       [facility] from [facility] in [place]. On EMS Arrival Pt had a GCS of 14.     Per EMS report: Pt was last seen normal yesterday. Staff called EMS today after noticing the dark urine.     EMS Vitals:   BP: 127/54  HR: 73  RR: 24  Spo2: 99% on RA  GCS: 14  BGL: 134    EMS conducted stroke screening: Negative    EMS Interventions: 18G IV left forearm, 500 NS    Pt has Hx significant for HTN, Possible TIAs  Pt takes: **See hardcopy list from facility    DNR paperwork received from EMS.

## 2018-09-07 NOTE — PROGRESS NOTES
2 RN skin check. Blanchable redness on the sacrum. Bruising on RUE. No open sores, or wounds. Will continue to monitor

## 2018-09-07 NOTE — ASSESSMENT & PLAN NOTE
- hx of frequent falls per nursing home staff and daughter  - has not fallen in past year  - Home health for PT/OT

## 2018-09-07 NOTE — SENIOR ADMIT NOTE
Senior Admission Note    In summary: Aurora Farias is a 86 y.o. female with past medical history history of UTI, aortic dissection s/p repair, hypothyroidism, femoral fracture s/p internal fixation, osteopenia, and frequent GLF's; who presents today, from her group home, due to weakness and dark urine.     Patient was weak and was hard to comprehend, HPI was taken from ERP and daughter who was at beside. She mentions that the group home staff called her about increased weakness, they make note of patients limited oral intake, patients daughter mentions this has been a big issue recently. Patient mentions to me that she had some abdominal discomfort, but denies fever or chills.     Patient was last hospitalized a year ago for a GLF and was noted to have been undergoing treatment at the time for a UTI, patient was on Cefdinir.      UA in the ED was indicative of active UTI.     Physical Exam   Constitutional: She is sleeping. She has a sickly appearance.   HENT:   Head: Normocephalic and atraumatic.   Ptosis   Neck: Neck supple.   Cardiovascular: Normal rate and regular rhythm.    Pulmonary/Chest: Effort normal and breath sounds normal.   Abdominal: Soft. Bowel sounds are normal. There is tenderness.   Musculoskeletal: She exhibits no edema.   Neurological: She is alert. A cranial nerve deficit is present.         Assessment and plan in summary:    UTI  Leukocytosis  Hypokalemia  Hypochloremia  Hyperglycemia  Meningioma  Plan  -Telemetry  -IVF  -UA + Cx  -Blood culutre  -Ceftriaxone with de-escalation with sensitivities      For full plan, please see Intern note for details   Willy Ortiz M.D.  PGY 2

## 2018-09-07 NOTE — ASSESSMENT & PLAN NOTE
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

## 2018-09-07 NOTE — ASSESSMENT & PLAN NOTE
- BP trending in normal range, bradycardia present  - Resume Amlodipine and Carvedilol.   - Continue to follow up as outpatient

## 2018-09-07 NOTE — H&P
Internal Medicine Admitting History and Physical    Note Author: Salo Pinto M.D.       Name Aurora Farias 10/19/1931   Age/Sex 86 y.o. female   MRN [id number]   Code Status DNR/DNI     After 5PM or if no immediate response to page, please call for cross-coverage  Attending/Team: Dr Dennis / [employer] [facility] See Patient List for primary contact information  Call [phone] to page    1st Call - Day Intern (R1):   Dr Black 2nd Call - Day Sr. Resident (R2/R3):   Dr Rae       Chief Complaint:   Foul smelling urine x some days    HPI:  Pt is a pleasant 86 yr old lady who presented from her group home and is accompanied by her daughter this evening; she was brought to the ER because of concern that she might be developing a UTI. She is somnolent and dysarthric and is a poor historian though she understands perfectly. History is provided by her daughter at bedside. She complained of not feeling well, and is known to have a chronically low oral intake of water and prescribed cranberry juice. It was noticed that she had a decreased appetite and generalized weakness today. It is also noticed that she sometimes does not regularly void urine and her bladder resultingly swells up and becomes very hard; this was the case recently, dark and foul smelling urine was not observed and she was brought in for evaluation. She has not had fever, chills, nausea, vomting, diarrhea, abdominal pain, myalgia, falls, headache, shortness of breath, cough, choking or post nasal drip. She does endorse some constipation on a regular basis which responds to Miralax.  She is edentulous and tolerates thick liquids and semisolid food since having a stroke 1 year ago. She is said to be mentally agile though not very vocal and conversant; she is able to ambulate without frequent falls with the help of her walker.        Review of Systems   Constitutional: Positive for malaise/fatigue. Negative for chills and fever.   HENT:  Negative for congestion, hearing loss, sinus pain and sore throat.    Eyes: Negative for blurred vision, double vision and photophobia.   Respiratory: Negative for cough, sputum production and shortness of breath.    Cardiovascular: Negative for chest pain, palpitations, leg swelling and PND.   Gastrointestinal: Positive for constipation. Negative for abdominal pain, blood in stool, diarrhea, melena, nausea and vomiting.   Genitourinary: Negative for dysuria, flank pain, frequency, hematuria and urgency.   Musculoskeletal: Positive for falls. Negative for back pain, joint pain and myalgias.   Skin: Negative for itching and rash.   Neurological: Positive for focal weakness and weakness. Negative for dizziness, sensory change, seizures, loss of consciousness and headaches.   Endo/Heme/Allergies: Negative for environmental allergies. Does not bruise/bleed easily.   Psychiatric/Behavioral: The patient is not nervous/anxious and does not have insomnia.              Past Medical History (Chronic medical problem, known complications and current treatment)    HTN  DLD  Hypothyroidism  AAA  Thoracic aortic aneurysm     Past Surgical History:  Past Surgical History:   Procedure Laterality Date   • OTHER CARDIAC SURGERY      aneurysm repair       Current Outpatient Medications:  Home Medications     Reviewed by Camila Abreu (Pharmacy Tech) on 09/06/18 at 2035  Med List Status: Complete   Medication Last Dose Status   aspirin 81 MG tablet 9/6/2018 Active   atorvastatin (LIPITOR) 20 MG Tab 9/6/2018 Active   Cholecalciferol (VITAMIN D) 2000 units Cap 9/6/2018 Active   Cranberry-Vitamin C-Vitamin E (CRANBERRY PLUS VITAMIN C) 4200-20-3 MG-MG-UNIT Cap 9/6/2018 Active   levothyroxine (SYNTHROID) 100 MCG Tab 9/6/2018 Active   metoprolol (TOPROL-XL) 200 MG XL tablet 9/5/2018 Active   polyethylene glycol/lytes (MIRALAX) Pack 9/6/2018 Active   therapeutic multivitamin-minerals (THERAGRAN-M) Tab 9/6/2018 Active  "  valsartan-hydrochlorothiazide (DIOVAN-HCT) 160-12.5 MG per tablet 9/6/2018 Active                Medication Allergy/Sensitivities:  Allergies   Allergen Reactions   • Penicillins          Family History (mandatory)   No hx of similar vascular pathology in family      Social History (mandatory)   Social History     Social History   • Marital status:      Spouse name: N/A   • Number of children: N/A   • Years of education: N/A     Occupational History   • Not on file.     Social History Main Topics   • Smoking status: Never Smoker   • Smokeless tobacco: Never Used   • Alcohol use No   • Drug use: No   • Sexual activity: Not on file     Other Topics Concern   • Not on file     Social History Narrative   • No narrative on file     Living situation: group home  PCP : Pcp Pt States None    Physical Exam     Vitals:    09/06/18 2130 09/06/18 2330 09/07/18 0030 09/07/18 0200   BP:       Pulse: (!) 53 62 63 61   Resp: (!) 21 20 (!) 21 (!) 26   Temp:       SpO2: 98% 95% 97% 96%   Weight:       Height:         Body mass index is 23.03 kg/m².  /49   Pulse 61   Temp 36.2 °C (97.1 °F)   Resp (!) 26   Ht 1.6 m (5' 2.99\")   Wt 59 kg (130 lb)   SpO2 96%   BMI 23.03 kg/m²   O2 therapy: Pulse Oximetry: 96 %, O2 Delivery: None (Room Air)    Physical Exam   Constitutional:   Appears frail and poorly nourished, dehydrated   HENT:   Head: Normocephalic and atraumatic.   Right Ear: External ear normal.   Left Ear: External ear normal.   Nose: Nose normal.   Oral mucosa dry   Eyes: Pupils are equal, round, and reactive to light. Conjunctivae and EOM are normal. Right eye exhibits no discharge. Left eye exhibits no discharge. No scleral icterus.   Neck: Normal range of motion. Neck supple. No thyromegaly present.   Neurofibroma in anterolateral neck   Cardiovascular: Regular rhythm and normal heart sounds.    No murmur heard.  Bradycardia  No irregularity heard at rest   Pulmonary/Chest: Breath sounds normal. No " respiratory distress. She has no wheezes. She exhibits no tenderness.   Abdominal: Soft. She exhibits no distension and no mass. There is no tenderness. There is no guarding.   Musculoskeletal: Normal range of motion. She exhibits no edema, tenderness or deformity.   Lymphadenopathy:     She has no cervical adenopathy.   Neurological: She is alert. She exhibits normal muscle tone.   Oriented to self and situation  Left sided ptosis   Skin: Skin is warm and dry. No rash noted. No erythema.   Psychiatric: Mood and affect normal.   Vitals reviewed.        Data Review       Old Records Request:   Completed  Current Records review/summary: Completed    Lab Data Review:  Recent Results (from the past 24 hour(s))   CBC WITH DIFFERENTIAL    Collection Time: 09/06/18  7:18 PM   Result Value Ref Range    WBC 11.7 (H) 4.8 - 10.8 K/uL    RBC 5.06 4.20 - 5.40 M/uL    Hemoglobin 14.2 12.0 - 16.0 g/dL    Hematocrit 45.3 37.0 - 47.0 %    MCV 89.5 81.4 - 97.8 fL    MCH 28.1 27.0 - 33.0 pg    MCHC 31.3 (L) 33.6 - 35.0 g/dL    RDW 48.7 35.9 - 50.0 fL    Platelet Count 166 164 - 446 K/uL    MPV 10.3 9.0 - 12.9 fL    Neutrophils-Polys 72.80 (H) 44.00 - 72.00 %    Lymphocytes 16.30 (L) 22.00 - 41.00 %    Monocytes 9.30 0.00 - 13.40 %    Eosinophils 0.80 0.00 - 6.90 %    Basophils 0.30 0.00 - 1.80 %    Immature Granulocytes 0.50 0.00 - 0.90 %    Nucleated RBC 0.00 /100 WBC    Neutrophils (Absolute) 8.49 (H) 2.00 - 7.15 K/uL    Lymphs (Absolute) 1.90 1.00 - 4.80 K/uL    Monos (Absolute) 1.09 (H) 0.00 - 0.85 K/uL    Eos (Absolute) 0.09 0.00 - 0.51 K/uL    Baso (Absolute) 0.03 0.00 - 0.12 K/uL    Immature Granulocytes (abs) 0.06 0.00 - 0.11 K/uL    NRBC (Absolute) 0.00 K/uL   COMP METABOLIC PANEL    Collection Time: 09/06/18  7:18 PM   Result Value Ref Range    Sodium 145 135 - 145 mmol/L    Potassium 3.4 (L) 3.6 - 5.5 mmol/L    Chloride 115 (H) 96 - 112 mmol/L    Co2 20 20 - 33 mmol/L    Anion Gap 10.0 0.0 - 11.9    Glucose 143 (H) 65 - 99  mg/dL    Bun 24 (H) 8 - 22 mg/dL    Creatinine 0.63 0.50 - 1.40 mg/dL    Calcium 8.9 8.5 - 10.5 mg/dL    AST(SGOT) 18 12 - 45 U/L    ALT(SGPT) 10 2 - 50 U/L    Alkaline Phosphatase 65 30 - 99 U/L    Total Bilirubin 2.0 (H) 0.1 - 1.5 mg/dL    Albumin 2.8 (L) 3.2 - 4.9 g/dL    Total Protein 5.4 (L) 6.0 - 8.2 g/dL    Globulin 2.6 1.9 - 3.5 g/dL    A-G Ratio 1.1 g/dL   LACTIC ACID    Collection Time: 18  7:18 PM   Result Value Ref Range    Lactic Acid 2.0 0.5 - 2.0 mmol/L   TROPONIN    Collection Time: 18  7:18 PM   Result Value Ref Range    Troponin I 0.01 0.00 - 0.04 ng/mL   ESTIMATED GFR    Collection Time: 18  7:18 PM   Result Value Ref Range    GFR If African American >60 >60 mL/min/1.73 m 2    GFR If Non African American >60 >60 mL/min/1.73 m 2   TSH    Collection Time: 18  7:18 PM   Result Value Ref Range    TSH 0.590 0.380 - 5.330 uIU/mL   FREE THYROXINE    Collection Time: 18  7:18 PM   Result Value Ref Range    Free T-4 1.55 (H) 0.53 - 1.43 ng/dL   MAGNESIUM    Collection Time: 18  7:18 PM   Result Value Ref Range    Magnesium 2.1 1.5 - 2.5 mg/dL   EKG (NOW)    Collection Time: 18  9:13 PM   Result Value Ref Range    Report       [facility] Emergency Dept.    Test Date:  2018  Pt Name:    LOUIE ROTH            Department: ER  MRN:        [id number]                      Room:       RD 04  Gender:     Female                       Technician: DANN  :        [id number]                   Requested By:AYDIN JESUS  Order #:    [id number]                    Reading MD:    Measurements  Intervals                                Axis  Rate:       62                           P:          -30  NV:         204                          QRS:        -33  QRSD:       106                          T:          244  QT:         420  QTc:        427    Interpretive Statements  SINUS RHYTHM  LVH WITH SECONDARY REPOLARIZATION ABNORMALITY  ANTERIOR Q WAVES,  POSSIBLY DUE TO LVH  Compared to ECG 09/12/2017 06:52:15  Early repolarization now present  Q waves now present     URINALYSIS CULTURE, IF INDICATED    Collection Time: 09/06/18  9:35 PM   Result Value Ref Range    Color Yellow     Character Turbid (A)     Specific Gravity 1.018 <1.035    Ph 7.5 5.0 - 8.0    Glucose Negative Negative mg/dL    Ketones Negative Negative mg/dL    Protein 100 (A) Negative mg/dL    Bilirubin Negative Negative    Urobilinogen, Urine 1.0 Negative    Nitrite Positive (A) Negative    Leukocyte Esterase Large (A) Negative    Occult Blood Large (A) Negative    Micro Urine Req Microscopic    URINE MICROSCOPIC (W/UA)    Collection Time: 09/06/18  9:35 PM   Result Value Ref Range    WBC Packed (A) /hpf    RBC 10-20 (A) /hpf    Bacteria Many (A) None /hpf    Epithelial Cells Rare /hpf    Hyaline Cast 0-2 /lpf       Imaging/Procedures Review:    Independant Imaging Review: Completed  CT-HEAD W/O   Final Result         1.  No acute intracranial abnormality is identified, there are nonspecific white matter changes, commonly associated with small vessel ischemic disease.  Associated mild cerebral atrophy is noted.   2.  Interhemispheric extra-axial mass anteriorly, appearance favors meningioma. Appears somewhat increased in size since prior study.   3.  Atherosclerosis.      DX-CHEST-PORTABLE (1 VIEW)   Final Result         1.  No acute cardiopulmonary disease.   2.  6.6 cm aneurysm of the aortic arch, appears somewhat increased since prior. Radiographic follow-up as clinically appropriate.   3.  Atherosclerosis           EKG:   EKG Independant Review: Completed  QTc:427, HR: 62, Normal Sinus Rhythm    Records reviewed and summarized in current documentation :  Yes  [employer] teaching service handout given to patient:  No         Assessment/Plan     UTI (urinary tract infection)- (present on admission)   Assessment & Plan    - hx of frequent and several UTIs per daughter  - pt usually wears Depends in  nursing home  - pt does not report dysuria/ hematuria/ CVA tenderness  - afebrile, WBC count 10 -->11.7, trending upwards, tachypnea  - U/A shows numerous WBCs, nitrite and LE positive  - lactic acid WNL  - SIRS 1/4 criteria with focus of infection    Plan  - IVF NS @ 75 cc/hr  - Rocephin 2 gm IV Q24H  - Urine C/S  - Blood C/S x 2  - catheterization  - trend CBC, w/f sepsis        Hypokalemia   Assessment & Plan    - K on presentation 3.4  - repleted Iv (KCl 40 mEq in NS)    Plan  - CTM        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    Plan  - watchful waiting, as it is not currently producing pressure effects  - consider dilated eye exam for papilledema        Ptosis of eyelid, left   Assessment & Plan    - new onset unilateral droop of eyelid per daughter, complete ptosis  - DIANE b/l  - anhidrosis could not be assessed, absent heterochromia  - mild enophthalmos present, no anisocoria  - frontalis, buccalis, orbicularis oris and occuli intact b/l  - hx of aortic aneurysm and aortic dissection  - 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 around arch of aorta  - pt also has incidentally found meningioma in parasagittal location, which could be compression CNVII in its intracranial course   and producing symptoms  - pt also has palpable neurofibroma in left side of neck        Dehydration   Assessment & Plan    - poor PO intake per hx  - serum Na 145, Hb 14  - BUN/Cr >20    Plan  - IVF NS @ 75 cc/hr infusion        Bradycardia   Assessment & Plan    - pt has been bradycardic since presentation  - HR in 50s and 60s  - BP trending WNL  - no prior hx of SSS / infiltrative heart d/o/ amyloidosis    Plan  - hold beta blocker        Hypothyroidism   Assessment &  Plan    - continue OP medication Synthroid 100 mcg po Qdaily        Paroxysmal A-fib (HCC)   Assessment & Plan    - currently in sinus rhythm, occasional bradycardia    Plan  - telemetry and cardiac monitor        CVA (cerebral vascular accident) ([facility])   Assessment & Plan    - 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            Anticipated Hospital stay: Observation admit        Quality Measures  Quality-Core Measures   Reviewed items::  EKG reviewed, Labs reviewed, Medications reviewed and Radiology images reviewed  Nance Catheter: Nance catheter to be inserted.  DVT prophylaxis pharmacological::  Enoxaparin (Lovenox)  Ulcer Prophylaxis::  Yes    PCP: Pcp Pt States None

## 2018-09-07 NOTE — ASSESSMENT & PLAN NOTE
- Hx of frequent and several UTIs per daughter  - pt usually wears Depends in nursing home  - Today she denies dysuria/ hematuria/ CVA tenderness  - WBC count currently 7.6  - U/A shows numerous WBCs, nitrite and LE positive, Urine C/S positive for E.Coli. Blood c/s no growth to date  - Pan sensitive E.Coli    Plan  - D/C Iv fluids IVF NS @ 50 cc/hr  - D/C Rocephin   - D/C home on Bacterim to complete 7 days treatment of UTI blood c/s no growth to date.

## 2018-09-07 NOTE — CARE PLAN
Problem: Skin Integrity  Goal: Risk for impaired skin integrity will decrease  Outcome: PROGRESSING AS EXPECTED  Q2H turn and monitor any skin breakdown

## 2018-09-07 NOTE — ASSESSMENT & PLAN NOTE
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

## 2018-09-07 NOTE — ED PROVIDER NOTES
"ED Provider Note    CHIEF COMPLAINT  Chief Complaint   Patient presents with   • ALOC     ALOC noticed today by group home along with dark colored urine        HPI    Primary care provider: Pcp Pt States None   History obtained from: Patient and caretaker  History limited by: None     Aurora Farias is a 86 y.o. female who presents to the ED by EMS for increasing weakness today according to the owner of the group home.  Most of the history was obtained from patient's caretaker.  She noticed that patient has been progressively weaker today without any new focal deficits.  Patient has had previous stroke with left-sided weakness and difficulty speaking.  Patient also with decreased appetite today.  Caretaker reports that patient generally is able to ambulate but today felt so weak that she was unable to do so.  She also noticed that patient's urine was darker color and had an odor and reports that patient with history of UTIs.  There has been no fever and vital signs have been normal per caretaker.  No vomiting or diarrhea.  No cough.  No rash noted.  No injury or trauma.  She states that patient was complaining of \"I just don't feel good.\"    REVIEW OF SYSTEMS  Please see HPI for pertinent positives/negatives.  All other systems reviewed and are negative.     PAST MEDICAL HISTORY  Past Medical History:   Diagnosis Date   • Aortic aneurysm without rupture (CMS-HCC) (HCC)     also 2nd AA   • Hypertension    • Thyroid disorder         SURGICAL HISTORY  Past Surgical History:   Procedure Laterality Date   • OTHER CARDIAC SURGERY      aneurysm repair        SOCIAL HISTORY  Social History     Social History Main Topics   • Smoking status: Never Smoker   • Smokeless tobacco: Never Used   • Alcohol use No   • Drug use: No   • Sexual activity: Not on file        FAMILY HISTORY  No family history on file.     CURRENT MEDICATIONS  Home Medications     Reviewed by Guero Chan R.N. (Registered Nurse) on 09/07/18 at 0232  " "Med List Status: Complete   Medication Last Dose Status   aspirin 81 MG tablet 9/6/2018 Active   atorvastatin (LIPITOR) 20 MG Tab 9/6/2018 Active   Cholecalciferol (VITAMIN D) 2000 units Cap 9/6/2018 Active   Cranberry-Vitamin C-Vitamin E (CRANBERRY PLUS VITAMIN C) 4200-20-3 MG-MG-UNIT Cap 9/6/2018 Active   levothyroxine (SYNTHROID) 100 MCG Tab 9/6/2018 Active   metoprolol (TOPROL-XL) 200 MG XL tablet 9/5/2018 Active   polyethylene glycol/lytes (MIRALAX) Pack 9/6/2018 Active   therapeutic multivitamin-minerals (THERAGRAN-M) Tab 9/6/2018 Active   valsartan-hydrochlorothiazide (DIOVAN-HCT) 160-12.5 MG per tablet 9/6/2018 Active                 ALLERGIES  Allergies   Allergen Reactions   • Penicillins         PHYSICAL EXAM  VITAL SIGNS: /49   Pulse 61   Temp 36.2 °C (97.1 °F)   Resp (!) 26   Ht 1.6 m (5' 2.99\")   Wt 59 kg (130 lb)   SpO2 96%   Breastfeeding? No   BMI 23.03 kg/m²  @RACHEAL[[id number]::@     Pulse ox interpretation: 97% I interpret this pulse ox as normal     Constitutional: Thin patient, alert in no apparent distress, nontoxic appearance    HENT: No external signs of trauma, normocephalic, oropharynx moist and clear, nose normal    Eyes: PERRL, conjunctiva without erythema, no discharge, no icterus    Neck: Soft and supple, trachea midline, no stridor, no tenderness, no LAD, no JVD, good ROM    Cardiovascular: Regular rate and rhythm, no murmurs/rubs/gallops, strong distal pulses and good perfusion    Thorax & Lungs: No respiratory distress, CTAB   Abdomen: Soft, nontender, nondistended, no guarding, no rebound, normal BS    Back: No CVAT    Extremities: No cyanosis, no edema, no gross deformity, no tenderness, intact distal pulses with brisk cap refill    Skin: Warm, dry, no pallor/cyanosis, no rash noted    Lymphatic: No lymphadenopathy noted    Neuro: Alert and oriented to person.  GCS [id number].  Left eyelid slight facial droop.  Slurred speech.  Able to resist gravity bilateral upper and lower " extremities but slightly weaker on the left compared to the right.  Psychiatric: Cooperative but slow to respond        DIAGNOSTIC STUDIES / PROCEDURES    EKG  12 Lead EKG obtained at 2113 and interpreted by me:   Rate: 62   Rhythm: Sinus rhythm   Ectopy: None  Intervals: First-degree block  [place]: LAD  QRS: LVH  ST segments: ST depression inferior and lateral leads  T Waves: T-wave inversion in inferior and lateral leads    Clinical Impression: Sinus rhythm with first-degree block and LVH       LABS  All labs reviewed by me.     Results for orders placed or performed during the hospital encounter of 09/06/18   CBC WITH DIFFERENTIAL   Result Value Ref Range    WBC 11.7 (H) 4.8 - 10.8 K/uL    RBC 5.06 4.20 - 5.40 M/uL    Hemoglobin 14.2 12.0 - 16.0 g/dL    Hematocrit 45.3 37.0 - 47.0 %    MCV 89.5 81.4 - 97.8 fL    MCH 28.1 27.0 - 33.0 pg    MCHC 31.3 (L) 33.6 - 35.0 g/dL    RDW 48.7 35.9 - 50.0 fL    Platelet Count 166 164 - 446 K/uL    MPV 10.3 9.0 - 12.9 fL    Neutrophils-Polys 72.80 (H) 44.00 - 72.00 %    Lymphocytes 16.30 (L) 22.00 - 41.00 %    Monocytes 9.30 0.00 - 13.40 %    Eosinophils 0.80 0.00 - 6.90 %    Basophils 0.30 0.00 - 1.80 %    Immature Granulocytes 0.50 0.00 - 0.90 %    Nucleated RBC 0.00 /100 WBC    Neutrophils (Absolute) 8.49 (H) 2.00 - 7.15 K/uL    Lymphs (Absolute) 1.90 1.00 - 4.80 K/uL    Monos (Absolute) 1.09 (H) 0.00 - 0.85 K/uL    Eos (Absolute) 0.09 0.00 - 0.51 K/uL    Baso (Absolute) 0.03 0.00 - 0.12 K/uL    Immature Granulocytes (abs) 0.06 0.00 - 0.11 K/uL    NRBC (Absolute) 0.00 K/uL   COMP METABOLIC PANEL   Result Value Ref Range    Sodium 145 135 - 145 mmol/L    Potassium 3.4 (L) 3.6 - 5.5 mmol/L    Chloride 115 (H) 96 - 112 mmol/L    Co2 20 20 - 33 mmol/L    Anion Gap 10.0 0.0 - 11.9    Glucose 143 (H) 65 - 99 mg/dL    Bun 24 (H) 8 - 22 mg/dL    Creatinine 0.63 0.50 - 1.40 mg/dL    Calcium 8.9 8.5 - 10.5 mg/dL    AST(SGOT) 18 12 - 45 U/L    ALT(SGPT) 10 2 - 50 U/L    Alkaline  Phosphatase 65 30 - 99 U/L    Total Bilirubin 2.0 (H) 0.1 - 1.5 mg/dL    Albumin 2.8 (L) 3.2 - 4.9 g/dL    Total Protein 5.4 (L) 6.0 - 8.2 g/dL    Globulin 2.6 1.9 - 3.5 g/dL    A-G Ratio 1.1 g/dL   LACTIC ACID   Result Value Ref Range    Lactic Acid 2.0 0.5 - 2.0 mmol/L   TROPONIN   Result Value Ref Range    Troponin I 0.01 0.00 - 0.04 ng/mL   URINALYSIS CULTURE, IF INDICATED   Result Value Ref Range    Color Yellow     Character Turbid (A)     Specific Gravity 1.018 <1.035    Ph 7.5 5.0 - 8.0    Glucose Negative Negative mg/dL    Ketones Negative Negative mg/dL    Protein 100 (A) Negative mg/dL    Bilirubin Negative Negative    Urobilinogen, Urine 1.0 Negative    Nitrite Positive (A) Negative    Leukocyte Esterase Large (A) Negative    Occult Blood Large (A) Negative    Micro Urine Req Microscopic    ESTIMATED GFR   Result Value Ref Range    GFR If African American >60 >60 mL/min/1.73 m 2    GFR If Non African American >60 >60 mL/min/1.73 m 2   TSH   Result Value Ref Range    TSH 0.590 0.380 - 5.330 uIU/mL   FREE THYROXINE   Result Value Ref Range    Free T-4 1.55 (H) 0.53 - 1.43 ng/dL   URINE MICROSCOPIC (W/UA)   Result Value Ref Range    WBC Packed (A) /hpf    RBC 10-20 (A) /hpf    Bacteria Many (A) None /hpf    Epithelial Cells Rare /hpf    Hyaline Cast 0-2 /lpf   MAGNESIUM   Result Value Ref Range    Magnesium 2.1 1.5 - 2.5 mg/dL   EKG (NOW)   Result Value Ref Range    Report       [facility]    Test Date:  2018  Pt Name:    LOUIE ROTH            Department: ER  MRN:        [id number]                      Room:       RD 04  Gender:     Female                       Technician: DANN  :        [id number]                   Requested By:AYDIN JESUS  Order #:    [id number]                    Reading MD:    Measurements  Intervals                                Axis  Rate:       62                           P:          -30  WA:         204                          QRS:         -33  QRSD:       106                          T:          244  QT:         420  QTc:        427    Interpretive Statements  SINUS RHYTHM  LVH WITH SECONDARY REPOLARIZATION ABNORMALITY  ANTERIOR Q WAVES, POSSIBLY DUE TO LVH  Compared to ECG 09/12/2017 06:52:15  Early repolarization now present  Q waves now present          RADIOLOGY  The radiologist's interpretation of all radiological studies have been reviewed by me.     CT-HEAD W/O   Final Result         1.  No acute intracranial abnormality is identified, there are nonspecific white matter changes, commonly associated with small vessel ischemic disease.  Associated mild cerebral atrophy is noted.   2.  Interhemispheric extra-axial mass anteriorly, appearance favors meningioma. Appears somewhat increased in size since prior study.   3.  Atherosclerosis.      DX-CHEST-PORTABLE (1 VIEW)   Final Result         1.  No acute cardiopulmonary disease.   2.  6.6 cm aneurysm of the aortic arch, appears somewhat increased since prior. Radiographic follow-up as clinically appropriate.   3.  Atherosclerosis             COURSE & MEDICAL DECISION MAKING  Nursing notes, VS, PMSFHx reviewed in chart.     Review of past medical records shows the patient was last admitted September 8, 2017 for CVA and discharged on September 18, 2017.      Differential diagnoses considered include but are not limited to: CVA/TIA/intracranial hemorrhage, sepsis, UTI, pneumonia, electrolyte abnormality, thyroid disorder       0030: D/W [facility] who will admit patient      Patient brought by EMS to the ED with above complaint.  EKG showed sinus rhythm with first-degree block and LVH.  Chest x-ray and CT head with findings as above.  Labs showed mild leukocytosis, hypokalemia, elevated bilirubin and hyperglycemia without evidence of DKA.  Patient also with possible UTI.  Findings discussed with patient and her daughter and they are agreeable to admission.  Patient was started on Rocephin.  Discussed  with [facility] who graciously agreed to admit patient for further care.        FINAL IMPRESSION  1. Acute UTI    2. Generalized weakness    3. Malaise and fatigue    4. Thoracic aortic aneurysm without rupture (HCC)           DISPOSITION  Patient will be admitted by [facility] for further care      Electronically signed by: Martin Ferguson, 9/6/2018 8:04 PM      Portions of this record were made with voice recognition software.  Despite my review, spelling/grammar/context errors may still remain.  Interpretation of this chart should be taken in this context.

## 2018-09-07 NOTE — PROGRESS NOTES
Patient is awake and is resting in bed.  Daughter is at bedside.  Denies pain.  Call bell in reach. All needs addressed at this time.  Will continue to monitor.

## 2018-09-07 NOTE — CARE PLAN
Problem: Safety  Goal: Will remain free from injury  Outcome: PROGRESSING AS EXPECTED  Patient needs assistance with ADLs.    Problem: Infection  Goal: Will remain free from infection  Outcome: PROGRESSING AS EXPECTED  Source of infection identified.

## 2018-09-07 NOTE — DIETARY
"Nutrition services: Day 0 of admit.  Aurora Farias is a 86 y.o. female with admitting DX of UTI.  Pt noted with poor PO intake on nutrition admit screen.  Pt appears nourished.  RD visited pt at bedside however pt was sleeping soundly and snoring.  RD was able to speak with pt's family at bedside regarding appetite, PO intake, and wt history.  Pt's family reports pt has a good appetite for her age however goes through periods of not eating or skipping occasional meals.  Family reports overall pt has been consuming ~50% of meals recently.  She does not drink any Boost or Ensure to family's knowledge - pt resides in a group home.  We discussed snacks at this time and family suggested a chocolate high protein milkshake to start with.  Family states pt has lost wt over the last 6 months - she used to weigh ~140 lbs (UBW) and wt upon current admit is 114 lbs.  Family is unsure why this might have happened - pt did have hip surgery but she seemed to have maintained her wt post surgery for awhile.  Family had no further needs or questions at this time.  RD called Nutrition Representative to review meals with pt and family before family leaves for the day.    Assessment:  Height: 160 cm (5' 2.99\")  Weight: 51.4 kg (113 lb 5.1 oz)  Body mass index is 20.08 kg/m². - WNL.  Diet/Intake: Regular, dysphagia 2, nectar thick.  Per chart, pt consumed <25% of breakfast.    Evaluation:   1. Pt noted with dehydration, meningioma, hypokalemia, and ptosis of eyelid.  2. Pt has a hx of HTN, dyslipidemia, hypothyroidism, and thoracic aortic aneurysm.  3. Pt with a 18.6% wt loss over the last 6 months, which is severe.  4. Labs: Potassium: 3.4, Glucose: 143, BUN: 24.  Meds reviewed.  5. Last BM: PTA.    Recommendations/Plan:  1. High protein milkshakes twice a day with meals.  2. Encourage intake of meals.  3. Document intake of all meals as % taken in ADL's to provide interdisciplinary communication across all shifts.   4. Monitor " weight.  5. Nutrition rep will continue to see patient for ongoing meal and snack preferences.   6. RD to obtain supplement order as needed.    RD following.

## 2018-09-07 NOTE — ASSESSMENT & PLAN NOTE
- Noticed on admission, likely secondary to poor oral intake  - BUN/Cr >20  - Resolved with iv fluids

## 2018-09-07 NOTE — PROGRESS NOTES
Report received at bedside.  RN assumed care.  Chart reviewed.  Patient resting in bed. Updated plan of care. Patient verified on telemetry.  Bed alarm on.  Call light in reach.  Bed in lowest position.  Non-slip socks on.  Patient alert and oriented x1.  Pain addressed.

## 2018-09-07 NOTE — ED NOTES
luisa sandy [phone].  Family at bedside, the group home owner may speak for patient and nurses may talk to luisa for any questions

## 2018-09-07 NOTE — PROGRESS NOTES
Internal Medicine Interval Note  Note Author: Julius Black M.D.     Name Aurora Farias 10/19/1931   Age/Sex 86 y.o. female   MRN [id number]   Code Status DNAR/DNI     After 5PM or if no immediate response to page, please call for cross-coverage  Attending/Team: Jeannie/Nas See Patient List for primary contact information  Call [phone] to page    1st Call - Day Intern (R1):   Dr. Julius Black 2nd Call - Day Sr. Resident (R2/R3):   Dr. Osmar Rae         Reason for interval visit  (Principal Problem)   UTI    Interval Problem Daily Status Update  (24 hours, problem oriented, brief subjective history, new lab/imaging data pertinent to that problem)     Patient admitted for UTI due to urinary complaints and positive UA. Patient not currently having any complaints. On ceftriaxone & 50cc NS MIVF. Will continue to treat UTI. Have reached out to family concerning 6.6 cm aortic aneurysm & 1.9 cm meningioma noted on CXR & head CT respectively. Will hold off on active management until have spoken with family.    Review of Systems   Constitutional: Negative for chills, diaphoresis, fever, malaise/fatigue and weight loss.   HENT: Negative for congestion, ear discharge, ear pain, hearing loss, nosebleeds, sinus pain, sore throat and tinnitus.    Respiratory: Negative for cough, hemoptysis, sputum production, shortness of breath, wheezing and stridor.    Cardiovascular: Negative for chest pain, palpitations, orthopnea, claudication and leg swelling.   Gastrointestinal: Negative for abdominal pain, blood in stool, constipation, diarrhea, heartburn, melena, nausea and vomiting.   Genitourinary: Negative for dysuria, flank pain, frequency, hematuria and urgency.   Neurological: Positive for focal weakness. Negative for dizziness, tingling, tremors, sensory change, speech change, seizures, loss of consciousness, weakness and headaches.   Psychiatric/Behavioral: Negative for depression,  hallucinations, memory loss, substance abuse and suicidal ideas. The patient is not nervous/anxious and does not have insomnia.        Disposition/Barriers to discharge:   Debility    Consultants/Specialty  None  PCP: Pcp Pt States None      Quality Measures  Quality-Core Measures        Physical Exam       Vitals:    09/07/18 0200 09/07/18 0230 09/07/18 0724 09/07/18 1153   BP:  143/71 134/56 135/60   Pulse: 61 63 67 74   Resp: (!) 26 (!) 22 16 19   Temp:  36.4 °C (97.5 °F) 36.9 °C (98.4 °F) 36.9 °C (98.5 °F)   SpO2: 96% 95% 96% 93%   Weight:  51.4 kg (113 lb 5.1 oz)     Height:         Body mass index is 20.08 kg/m². Weight: 51.4 kg (113 lb 5.1 oz)  Oxygen Therapy:  Pulse Oximetry: 93 %, O2 (LPM): 0, O2 Delivery: None (Room Air)    Physical Exam   Constitutional: She is oriented to person, place, and time. Vital signs are normal. She appears dehydrated. She appears cachectic. No distress.   HENT:   Head: Normocephalic and atraumatic.   Mouth/Throat: Mucous membranes are dry. She has dentures.   Neck: No JVD present. No tracheal deviation present. No thyromegaly present.   Cardiovascular: Regular rhythm, S1 normal, S2 normal, intact distal pulses and normal pulses.  Bradycardia present.  Exam reveals no S3 and no S4.    Murmur heard.   Systolic murmur is present with a grade of 3/6   Pulmonary/Chest: Effort normal and breath sounds normal. No accessory muscle usage or stridor. No tachypnea and no bradypnea. No respiratory distress. She has no wheezes. She has no rales. She exhibits no tenderness.   Abdominal: She exhibits no distension and no mass. There is no tenderness. There is no rebound and no guarding.   Lymphadenopathy:     She has no cervical adenopathy.   Neurological: She is alert and oriented to person, place, and time. A cranial nerve deficit is present.   Skin: Skin is dry. No rash noted. She is not diaphoretic. No erythema. No pallor.   Psychiatric: Mood, memory, affect and judgment normal.              Assessment/Plan     UTI (urinary tract infection)- (present on admission)   Assessment & Plan    - hx of frequent and several UTIs per daughter  - pt usually wears Depends in nursing home  - pt does not report dysuria/ hematuria/ CVA tenderness  - afebrile, WBC count 10 -->11.7-->13.5, trending upwards  - U/A shows numerous WBCs, nitrite and LE positive  - lactic acid WNL  - SIRS 1/4 criteria with focus of infection    Plan  - IVF NS @ 50 cc/hr  - Rocephin 2 gm IV Q24H  - Urine C/S  - Blood C/S x 2  - catheterization  - trend CBC, w/f sepsis        Hypokalemia   Assessment & Plan    - K on presentation 3.4  - repleted Iv (KCl 40 mEq in NS)    Plan  - CTM        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    Plan  - watchful waiting, as it is not currently producing pressure effects  - consider dilated eye exam for papilledema        Ptosis of eyelid, left   Assessment & Plan    - new onset unilateral droop of eyelid per daughter, complete ptosis  - DIANE b/l  - anhidrosis could not be assessed, absent heterochromia  - mild enophthalmos present, no anisocoria  - frontalis, buccalis, orbicularis oris and occuli intact b/l  - hx of aortic aneurysm and aortic dissection  - 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 around arch of aorta  - pt also has incidentally found meningioma in parasagittal location, which could be compression CNVII in its intracranial course   and producing symptoms  - pt also has palpable neurofibroma in left side of neck        Dehydration   Assessment & Plan    - poor PO intake per hx  - serum Na 145, Hb 14  - BUN/Cr >20    Plan  - IVF NS @ 75 cc/hr infusion        Bradycardia   Assessment & Plan    - pt has been bradycardic  since presentation  - HR in 50s and 60s  - BP trending WNL  - no prior hx of SSS / infiltrative heart d/o/ amyloidosis    Plan  - hold beta blocker        Hypothyroidism   Assessment & Plan    - continue OP medication Synthroid 100 mcg po Qdaily        Paroxysmal A-fib ([facility])   Assessment & Plan    - currently in sinus rhythm, occasional bradycardia    Plan  - telemetry and cardiac monitor        CVA (cerebral vascular accident) ([facility])   Assessment & Plan    - 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

## 2018-09-07 NOTE — CARE PLAN
Problem: Urinary Elimination:  Goal: Ability to reestablish a normal urinary elimination pattern will improve  Outcome: PROGRESSING AS EXPECTED  Discussed with patient's family regarding possible UTI and general plan of care related to it

## 2018-09-08 LAB
ALBUMIN SERPL BCP-MCNC: 2.9 G/DL (ref 3.2–4.9)
ALBUMIN/GLOB SERPL: 1.1 G/DL
ALP SERPL-CCNC: 64 U/L (ref 30–99)
ALT SERPL-CCNC: 7 U/L (ref 2–50)
ANION GAP SERPL CALC-SCNC: 8 MMOL/L (ref 0–11.9)
AST SERPL-CCNC: 13 U/L (ref 12–45)
BASOPHILS # BLD AUTO: 0.3 % (ref 0–1.8)
BASOPHILS # BLD: 0.03 K/UL (ref 0–0.12)
BILIRUB SERPL-MCNC: 2.1 MG/DL (ref 0.1–1.5)
BUN SERPL-MCNC: 11 MG/DL (ref 8–22)
CALCIUM SERPL-MCNC: 9.1 MG/DL (ref 8.5–10.5)
CHLORIDE SERPL-SCNC: 113 MMOL/L (ref 96–112)
CO2 SERPL-SCNC: 22 MMOL/L (ref 20–33)
CREAT SERPL-MCNC: 0.48 MG/DL (ref 0.5–1.4)
EOSINOPHIL # BLD AUTO: 0.09 K/UL (ref 0–0.51)
EOSINOPHIL NFR BLD: 1 % (ref 0–6.9)
ERYTHROCYTE [DISTWIDTH] IN BLOOD BY AUTOMATED COUNT: 48.2 FL (ref 35.9–50)
GLOBULIN SER CALC-MCNC: 2.6 G/DL (ref 1.9–3.5)
GLUCOSE SERPL-MCNC: 95 MG/DL (ref 65–99)
HCT VFR BLD AUTO: 41.8 % (ref 37–47)
HGB BLD-MCNC: 13.8 G/DL (ref 12–16)
IMM GRANULOCYTES # BLD AUTO: 0.07 K/UL (ref 0–0.11)
IMM GRANULOCYTES NFR BLD AUTO: 0.8 % (ref 0–0.9)
LYMPHOCYTES # BLD AUTO: 1.98 K/UL (ref 1–4.8)
LYMPHOCYTES NFR BLD: 22.3 % (ref 22–41)
MCH RBC QN AUTO: 29.6 PG (ref 27–33)
MCHC RBC AUTO-ENTMCNC: 33 G/DL (ref 33.6–35)
MCV RBC AUTO: 89.5 FL (ref 81.4–97.8)
MONOCYTES # BLD AUTO: 0.67 K/UL (ref 0–0.85)
MONOCYTES NFR BLD AUTO: 7.5 % (ref 0–13.4)
NEUTROPHILS # BLD AUTO: 6.05 K/UL (ref 2–7.15)
NEUTROPHILS NFR BLD: 68.1 % (ref 44–72)
NRBC # BLD AUTO: 0 K/UL
NRBC BLD-RTO: 0 /100 WBC
PLATELET # BLD AUTO: 153 K/UL (ref 164–446)
PMV BLD AUTO: 10.4 FL (ref 9–12.9)
POTASSIUM SERPL-SCNC: 3.8 MMOL/L (ref 3.6–5.5)
PROT SERPL-MCNC: 5.5 G/DL (ref 6–8.2)
RBC # BLD AUTO: 4.67 M/UL (ref 4.2–5.4)
SODIUM SERPL-SCNC: 143 MMOL/L (ref 135–145)
WBC # BLD AUTO: 8.9 K/UL (ref 4.8–10.8)

## 2018-09-08 PROCEDURE — 99226 PR SUBSEQUENT OBSERVATION CARE,LEVEL III: CPT | Mod: GC | Performed by: INTERNAL MEDICINE

## 2018-09-08 PROCEDURE — G0378 HOSPITAL OBSERVATION PER HR: HCPCS

## 2018-09-08 PROCEDURE — 80053 COMPREHEN METABOLIC PANEL: CPT

## 2018-09-08 PROCEDURE — 700111 HCHG RX REV CODE 636 W/ 250 OVERRIDE (IP): Performed by: STUDENT IN AN ORGANIZED HEALTH CARE EDUCATION/TRAINING PROGRAM

## 2018-09-08 PROCEDURE — 36415 COLL VENOUS BLD VENIPUNCTURE: CPT

## 2018-09-08 PROCEDURE — 700105 HCHG RX REV CODE 258: Performed by: GENERAL PRACTICE

## 2018-09-08 PROCEDURE — 700105 HCHG RX REV CODE 258: Performed by: STUDENT IN AN ORGANIZED HEALTH CARE EDUCATION/TRAINING PROGRAM

## 2018-09-08 PROCEDURE — 700102 HCHG RX REV CODE 250 W/ 637 OVERRIDE(OP): Performed by: STUDENT IN AN ORGANIZED HEALTH CARE EDUCATION/TRAINING PROGRAM

## 2018-09-08 PROCEDURE — 85025 COMPLETE CBC W/AUTO DIFF WBC: CPT

## 2018-09-08 PROCEDURE — A9270 NON-COVERED ITEM OR SERVICE: HCPCS | Performed by: STUDENT IN AN ORGANIZED HEALTH CARE EDUCATION/TRAINING PROGRAM

## 2018-09-08 RX ADMIN — SENNOSIDES AND DOCUSATE SODIUM 2 TABLET: 8.6; 5 TABLET ORAL at 05:34

## 2018-09-08 RX ADMIN — ENOXAPARIN SODIUM 40 MG: 40 INJECTION SUBCUTANEOUS at 06:08

## 2018-09-08 RX ADMIN — CEFTRIAXONE SODIUM 2 G: 2 INJECTION, POWDER, FOR SOLUTION INTRAMUSCULAR; INTRAVENOUS at 05:35

## 2018-09-08 RX ADMIN — MULTIPLE VITAMINS W/ MINERALS TAB 1 TABLET: TAB at 05:34

## 2018-09-08 RX ADMIN — VITAMIN D, TAB 1000IU (100/BT) 1000 UNITS: 25 TAB at 05:34

## 2018-09-08 RX ADMIN — ASPIRIN 81 MG: 81 TABLET, CHEWABLE ORAL at 18:39

## 2018-09-08 RX ADMIN — ATORVASTATIN CALCIUM 20 MG: 20 TABLET, FILM COATED ORAL at 05:34

## 2018-09-08 RX ADMIN — SODIUM CHLORIDE: 9 INJECTION, SOLUTION INTRAVENOUS at 21:01

## 2018-09-08 RX ADMIN — LEVOTHYROXINE SODIUM 100 MCG: 100 TABLET ORAL at 05:34

## 2018-09-08 ASSESSMENT — ENCOUNTER SYMPTOMS
HALLUCINATIONS: 0
DIAPHORESIS: 0
FLANK PAIN: 0
LOSS OF CONSCIOUSNESS: 0
HEARTBURN: 0
TINGLING: 0
BLOOD IN STOOL: 0
NAUSEA: 0
TREMORS: 0
FOCAL WEAKNESS: 1
WHEEZING: 0
VOMITING: 0
SPEECH CHANGE: 0
DEPRESSION: 0
NERVOUS/ANXIOUS: 0
DIZZINESS: 0
HEADACHES: 0
COUGH: 0
SHORTNESS OF BREATH: 0
CHILLS: 0
SEIZURES: 0
CLAUDICATION: 0
PALPITATIONS: 0
SPUTUM PRODUCTION: 0
HEMOPTYSIS: 0
SENSORY CHANGE: 0
FEVER: 0
WEIGHT LOSS: 0
INSOMNIA: 0
ORTHOPNEA: 0
DIARRHEA: 0
SORE THROAT: 0
ABDOMINAL PAIN: 0
MEMORY LOSS: 0
STRIDOR: 0
WEAKNESS: 0
CONSTIPATION: 0
SINUS PAIN: 0

## 2018-09-08 ASSESSMENT — PAIN SCALES - GENERAL
PAINLEVEL_OUTOF10: 0

## 2018-09-08 ASSESSMENT — LIFESTYLE VARIABLES: SUBSTANCE_ABUSE: 0

## 2018-09-08 NOTE — PROGRESS NOTES
Received report from night nurse. Pt waking from sleep and somewhat confused. Requested water, thickened water provided in spoonfuls. Pt oriented to self and environment. Bed in lowest position with call light in reach. Will continue to monito

## 2018-09-08 NOTE — PROGRESS NOTES
2 RN skin check completed with Guero.    Sacrum redness, blanching.    Heels pink, blanching, floating on pillows.    Tiny scab on left upper arm, appears healing well.     All other skin intact with no redness.

## 2018-09-08 NOTE — CARE PLAN
Problem: Safety  Goal: Will remain free from injury  Outcome: PROGRESSING AS EXPECTED  Bed alarm used, bed at lowest setting and visualize patient every hour

## 2018-09-08 NOTE — PROGRESS NOTES
Internal Medicine Interval Note  Note Author: Julius Black M.D.     Name Aurora Farias 10/19/1931   Age/Sex 86 y.o. female   MRN [id number]   Code Status DNAR/DNI     After 5PM or if no immediate response to page, please call for cross-coverage  Attending/Team: Jeannie/Nas See Patient List for primary contact information  Call [phone] to page    1st Call - Day Intern (R1):   Dr. Julius Black 2nd Call - Day Sr. Resident (R2/R3):   Dr. Osmar Rae         Reason for interval visit  (Principal Problem)   UTI    Interval Problem Daily Status Update  (24 hours, problem oriented, brief subjective history, new lab/imaging data pertinent to that problem)     Patient admitted for UTI due to urinary complaints and positive UA. Patient not currently having any complaints. On ceftriaxone & 50cc NS MIVF. Will continue to treat UTI. Have reached out to family concerning 6.6 cm aortic aneurysm & 1.9 cm meningioma noted on CXR & head CT respectively. Spoke with daughter and she states that further surgical management of aortic aneurysm and meningioma are not desired at this time after consideration of patient's overall health at this time. Spoke with nursing staff in order to assist with nutritional intake.    Review of Systems   Constitutional: Negative for chills, diaphoresis, fever, malaise/fatigue and weight loss.   HENT: Negative for congestion, ear discharge, ear pain, hearing loss, nosebleeds, sinus pain, sore throat and tinnitus.    Respiratory: Negative for cough, hemoptysis, sputum production, shortness of breath, wheezing and stridor.    Cardiovascular: Negative for chest pain, palpitations, orthopnea, claudication and leg swelling.   Gastrointestinal: Negative for abdominal pain, blood in stool, constipation, diarrhea, heartburn, melena, nausea and vomiting.   Genitourinary: Negative for dysuria, flank pain, frequency, hematuria and urgency.   Neurological: Positive for focal  weakness. Negative for dizziness, tingling, tremors, sensory change, speech change, seizures, loss of consciousness, weakness and headaches.   Psychiatric/Behavioral: Negative for depression, hallucinations, memory loss, substance abuse and suicidal ideas. The patient is not nervous/anxious and does not have insomnia.        Disposition/Barriers to discharge:   Debility    Consultants/Specialty  None  PCP: Pcp Pt States None      Quality Measures  Quality-Core Measures        Physical Exam       Vitals:    09/07/18 2000 09/08/18 0000 09/08/18 0400 09/08/18 0700   BP: 121/54 107/52 140/53 151/67   Pulse: 69 72 64 60   Resp: 18 18 18 18   Temp: 36.4 °C (97.6 °F) 36.6 °C (97.8 °F) 36.3 °C (97.3 °F) 36.1 °C (97 °F)   SpO2: 94% 96% 97% 96%   Weight:       Height:         Body mass index is 20.08 kg/m².    Oxygen Therapy:  Pulse Oximetry: 96 %, O2 (LPM): 0, O2 Delivery: None (Room Air)    Physical Exam   Constitutional: She is oriented to person, place, and time. Vital signs are normal. She appears dehydrated. She appears cachectic. No distress.   HENT:   Head: Normocephalic and atraumatic.   Mouth/Throat: Mucous membranes are dry. She has dentures.   Neck: No JVD present. No tracheal deviation present. No thyromegaly present.   Cardiovascular: Regular rhythm, S1 normal, S2 normal, intact distal pulses and normal pulses.  Bradycardia present.  Exam reveals no S3 and no S4.    Murmur heard.   Systolic murmur is present with a grade of 3/6   Pulmonary/Chest: Effort normal and breath sounds normal. No accessory muscle usage or stridor. No tachypnea and no bradypnea. No respiratory distress. She has no wheezes. She has no rales. She exhibits no tenderness.   Abdominal: She exhibits no distension and no mass. There is no tenderness. There is no rebound and no guarding.   Lymphadenopathy:     She has no cervical adenopathy.   Neurological: She is alert and oriented to person, place, and time. A cranial nerve deficit is present.    Skin: Skin is dry. No rash noted. She is not diaphoretic. No erythema. No pallor.   Psychiatric: Mood, memory, affect and judgment normal.             Assessment/Plan     UTI (urinary tract infection)- (present on admission)   Assessment & Plan    - hx of frequent and several UTIs per daughter  - pt usually wears Depends in nursing home  - pt does not report dysuria/ hematuria/ CVA tenderness  - afebrile, WBC count 10 -->11.7-->13.5, trending upwards  - U/A shows numerous WBCs, nitrite and LE positive  - lactic acid WNL  - SIRS 1/4 criteria with focus of infection    Plan  - IVF NS @ 50 cc/hr  - Rocephin 2 gm IV Q24H  - Urine C/S  - Blood C/S x 2  - catheterization  - trend CBC, w/f sepsis        Hypokalemia   Assessment & Plan    - K on presentation 3.4  - repleted Iv (KCl 40 mEq in NS)    Plan  - CTM        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    Plan  - watchful waiting, as it is not currently producing pressure effects  - consider dilated eye exam for papilledema        Ptosis of eyelid, left   Assessment & Plan    - new onset unilateral droop of eyelid per daughter, complete ptosis  - DIANE b/l  - anhidrosis could not be assessed, absent heterochromia  - mild enophthalmos present, no anisocoria  - frontalis, buccalis, orbicularis oris and occuli intact b/l  - hx of aortic aneurysm and aortic dissection  - 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 around arch of aorta  - pt also has incidentally found meningioma in parasagittal location, which could be compression CNVII in its intracranial course   and producing symptoms  - pt also has palpable neurofibroma in left side of neck        Dehydration   Assessment & Plan    - poor PO intake per hx  -  serum Na 145, Hb 14  - BUN/Cr >20    Plan  - IVF NS @ 75 cc/hr infusion        Bradycardia   Assessment & Plan    - pt has been bradycardic since presentation  - HR in 50s and 60s  - BP trending WNL  - no prior hx of SSS / infiltrative heart d/o/ amyloidosis    Plan  - hold beta blocker        Hypothyroidism   Assessment & Plan    - continue OP medication Synthroid 100 mcg po Qdaily        Paroxysmal A-fib (Formerly [facility])   Assessment & Plan    - currently in sinus rhythm, occasional bradycardia    Plan  - telemetry and cardiac monitor        CVA (cerebral vascular accident) (Formerly [facility])   Assessment & Plan    - 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

## 2018-09-08 NOTE — PROGRESS NOTES
Pt resting in bed this morning, had a better appetite for breakfast but still only ate about 25-50% of meal.  Q2 turns in place, waffle mattress topper in place. Hourly rounding in place.

## 2018-09-08 NOTE — CARE PLAN
Problem: Skin Integrity  Goal: Risk for impaired skin integrity will decrease  Outcome: PROGRESSING AS EXPECTED  Q2H turn and monitor redness in left buttocks area. Monitor all other bony prominence

## 2018-09-09 LAB
ALBUMIN SERPL BCP-MCNC: 2.7 G/DL (ref 3.2–4.9)
ALBUMIN/GLOB SERPL: 1 G/DL
ALP SERPL-CCNC: 64 U/L (ref 30–99)
ALT SERPL-CCNC: 7 U/L (ref 2–50)
ANION GAP SERPL CALC-SCNC: 6 MMOL/L (ref 0–11.9)
AST SERPL-CCNC: 12 U/L (ref 12–45)
BACTERIA UR CULT: ABNORMAL
BACTERIA UR CULT: ABNORMAL
BASOPHILS # BLD AUTO: 0.4 % (ref 0–1.8)
BASOPHILS # BLD: 0.03 K/UL (ref 0–0.12)
BILIRUB SERPL-MCNC: 1.9 MG/DL (ref 0.1–1.5)
BUN SERPL-MCNC: 9 MG/DL (ref 8–22)
CALCIUM SERPL-MCNC: 8.6 MG/DL (ref 8.5–10.5)
CHLORIDE SERPL-SCNC: 115 MMOL/L (ref 96–112)
CO2 SERPL-SCNC: 20 MMOL/L (ref 20–33)
CREAT SERPL-MCNC: 0.45 MG/DL (ref 0.5–1.4)
EKG IMPRESSION: NORMAL
EOSINOPHIL # BLD AUTO: 0.19 K/UL (ref 0–0.51)
EOSINOPHIL NFR BLD: 2.3 % (ref 0–6.9)
ERYTHROCYTE [DISTWIDTH] IN BLOOD BY AUTOMATED COUNT: 48.1 FL (ref 35.9–50)
GLOBULIN SER CALC-MCNC: 2.7 G/DL (ref 1.9–3.5)
GLUCOSE SERPL-MCNC: 85 MG/DL (ref 65–99)
HCT VFR BLD AUTO: 44.2 % (ref 37–47)
HGB BLD-MCNC: 13.9 G/DL (ref 12–16)
IMM GRANULOCYTES # BLD AUTO: 0.1 K/UL (ref 0–0.11)
IMM GRANULOCYTES NFR BLD AUTO: 1.2 % (ref 0–0.9)
LYMPHOCYTES # BLD AUTO: 2.22 K/UL (ref 1–4.8)
LYMPHOCYTES NFR BLD: 27 % (ref 22–41)
MCH RBC QN AUTO: 28.4 PG (ref 27–33)
MCHC RBC AUTO-ENTMCNC: 31.4 G/DL (ref 33.6–35)
MCV RBC AUTO: 90.4 FL (ref 81.4–97.8)
MONOCYTES # BLD AUTO: 0.56 K/UL (ref 0–0.85)
MONOCYTES NFR BLD AUTO: 6.8 % (ref 0–13.4)
NEUTROPHILS # BLD AUTO: 5.12 K/UL (ref 2–7.15)
NEUTROPHILS NFR BLD: 62.3 % (ref 44–72)
NRBC # BLD AUTO: 0 K/UL
NRBC BLD-RTO: 0 /100 WBC
PLATELET # BLD AUTO: 162 K/UL (ref 164–446)
PMV BLD AUTO: 10.6 FL (ref 9–12.9)
POTASSIUM SERPL-SCNC: 3.9 MMOL/L (ref 3.6–5.5)
PROT SERPL-MCNC: 5.4 G/DL (ref 6–8.2)
RBC # BLD AUTO: 4.89 M/UL (ref 4.2–5.4)
SIGNIFICANT IND 70042: ABNORMAL
SITE SITE: ABNORMAL
SODIUM SERPL-SCNC: 141 MMOL/L (ref 135–145)
SOURCE SOURCE: ABNORMAL
WBC # BLD AUTO: 8.2 K/UL (ref 4.8–10.8)

## 2018-09-09 PROCEDURE — A9270 NON-COVERED ITEM OR SERVICE: HCPCS | Performed by: STUDENT IN AN ORGANIZED HEALTH CARE EDUCATION/TRAINING PROGRAM

## 2018-09-09 PROCEDURE — 99225 PR SUBSEQUENT OBSERVATION CARE,LEVEL II: CPT | Mod: GC | Performed by: INTERNAL MEDICINE

## 2018-09-09 PROCEDURE — 36415 COLL VENOUS BLD VENIPUNCTURE: CPT

## 2018-09-09 PROCEDURE — 700102 HCHG RX REV CODE 250 W/ 637 OVERRIDE(OP): Performed by: STUDENT IN AN ORGANIZED HEALTH CARE EDUCATION/TRAINING PROGRAM

## 2018-09-09 PROCEDURE — 96366 THER/PROPH/DIAG IV INF ADDON: CPT

## 2018-09-09 PROCEDURE — 92610 EVALUATE SWALLOWING FUNCTION: CPT

## 2018-09-09 PROCEDURE — 700111 HCHG RX REV CODE 636 W/ 250 OVERRIDE (IP): Performed by: STUDENT IN AN ORGANIZED HEALTH CARE EDUCATION/TRAINING PROGRAM

## 2018-09-09 PROCEDURE — G0378 HOSPITAL OBSERVATION PER HR: HCPCS

## 2018-09-09 PROCEDURE — 85025 COMPLETE CBC W/AUTO DIFF WBC: CPT

## 2018-09-09 PROCEDURE — 700105 HCHG RX REV CODE 258: Performed by: STUDENT IN AN ORGANIZED HEALTH CARE EDUCATION/TRAINING PROGRAM

## 2018-09-09 PROCEDURE — G8997 SWALLOW GOAL STATUS: HCPCS | Mod: CI

## 2018-09-09 PROCEDURE — 80053 COMPREHEN METABOLIC PANEL: CPT

## 2018-09-09 PROCEDURE — G8996 SWALLOW CURRENT STATUS: HCPCS | Mod: CJ

## 2018-09-09 RX ADMIN — CEFTRIAXONE SODIUM 2 G: 2 INJECTION, POWDER, FOR SOLUTION INTRAMUSCULAR; INTRAVENOUS at 06:28

## 2018-09-09 RX ADMIN — VITAMIN D, TAB 1000IU (100/BT) 1000 UNITS: 25 TAB at 06:28

## 2018-09-09 RX ADMIN — MAGNESIUM HYDROXIDE 30 ML: 400 SUSPENSION ORAL at 18:30

## 2018-09-09 RX ADMIN — SENNOSIDES AND DOCUSATE SODIUM 2 TABLET: 8.6; 5 TABLET ORAL at 06:28

## 2018-09-09 RX ADMIN — LEVOTHYROXINE SODIUM 100 MCG: 100 TABLET ORAL at 06:28

## 2018-09-09 RX ADMIN — SENNOSIDES AND DOCUSATE SODIUM 2 TABLET: 8.6; 5 TABLET ORAL at 18:30

## 2018-09-09 RX ADMIN — ENOXAPARIN SODIUM 40 MG: 40 INJECTION SUBCUTANEOUS at 06:28

## 2018-09-09 RX ADMIN — ATORVASTATIN CALCIUM 20 MG: 20 TABLET, FILM COATED ORAL at 06:28

## 2018-09-09 RX ADMIN — MULTIPLE VITAMINS W/ MINERALS TAB 1 TABLET: TAB at 06:28

## 2018-09-09 RX ADMIN — ASPIRIN 81 MG: 81 TABLET, CHEWABLE ORAL at 18:30

## 2018-09-09 ASSESSMENT — ENCOUNTER SYMPTOMS
SINUS PAIN: 0
MEMORY LOSS: 0
WEAKNESS: 0
CHILLS: 0
HEMOPTYSIS: 0
DIARRHEA: 0
SPUTUM PRODUCTION: 0
WHEEZING: 0
ABDOMINAL PAIN: 0
FOCAL WEAKNESS: 1
FLANK PAIN: 0
COUGH: 0
BLOOD IN STOOL: 0
HEADACHES: 0
PALPITATIONS: 0
DIAPHORESIS: 0
LOSS OF CONSCIOUSNESS: 0
SPEECH CHANGE: 0
DEPRESSION: 0
HALLUCINATIONS: 0
STRIDOR: 0
NAUSEA: 0
CLAUDICATION: 0
SORE THROAT: 0
TINGLING: 0
INSOMNIA: 0
FEVER: 0
SHORTNESS OF BREATH: 0
ORTHOPNEA: 0
WEIGHT LOSS: 0
NERVOUS/ANXIOUS: 0
CONSTIPATION: 0
TREMORS: 0
SENSORY CHANGE: 0
HEARTBURN: 0
VOMITING: 0
DIZZINESS: 0
SEIZURES: 0

## 2018-09-09 ASSESSMENT — PAIN SCALES - GENERAL
PAINLEVEL_OUTOF10: 0
PAINLEVEL_OUTOF10: 0

## 2018-09-09 ASSESSMENT — LIFESTYLE VARIABLES: SUBSTANCE_ABUSE: 0

## 2018-09-09 NOTE — THERAPY
"  Speech Language Therapy Clinical Swallow Evaluation completed.  Functional Status: Patient seen this date for CSE. RN reporting coughing with breakfast, particularly on chopped ham in the eggs. Patient also was not wearing dentures at AM meal. Seen for lunch with a Dysphagia 2 diet tray. Daughter present and providing some history. Patient able to answer questions, but often answered, \"I don't know\". She was able to make a choice between two options and verbalized needs with a prompt. Speech was slow and deliberate. Processing time was increased as well. Oral mech exam revealed patient is edentulous, but has top and bottom dentures present. The were fitted for the meal with Polygrip. Strength and range of motion for the oral musculature was reduced. Patient generally independent for self feeding, but requiring consistent prompts for pacing and smaller bite sized. Consumption of nectar thick liquids, pureed consistency, and soft chopped mechanicals without over s/sx of aspiration or difficulty. Coughing x1 when patient drank nectar thick liquids with consecutive swallows. Patient is tolerating diet but requires direct observation and cueing during meals. SLP following. Please hold PO and contact SLP with any questions or concerns.     Recommendations - Diet: Diet / Liquid Recommendation: Dysphagia II, Nectar Thick Liquid                          Strategies: Direct supervision during meals, No Straws and Head of Bed at 90 Degrees, Put patient's dentures in with Polygrip                          Medication Administration: Medication Administration : Crush all Medications in Puree  Plan of Care: Will benefit from Speech Therapy 3 times per week  Post-Acute Therapy: Discharge to a transitional care facility for continued skilled therapy services.    See \"Rehab Therapy-Acute\" Patient Summary Report for complete documentation.     "

## 2018-09-09 NOTE — DISCHARGE SUMMARY
Internal Medicine Discharge Summary  Note Author: Osmar Rae M.D.       Admit Date:  9/6/2018       Discharge Date:   09/10/18    Service:   [facility] Internal Medicine Purple Team  Attending Physician(s):   Dr Ware    Senior Resident(s):   Kyra  Benigno Resident(s):   Wayne  PCP: Pcp Pt States None      Primary Diagnosis:   UTI    Secondary Diagnoses:                Active Problems:    UTI (urinary tract infection) POA: Yes    Bradycardia POA: Unknown    Dehydration POA: Unknown    Ptosis of eyelid, left POA: Unknown    Meningioma (HCC) POA: Yes    Hypokalemia POA: Unknown  Resolved Problems:    * No resolved hospital problems. *      Hospital Summary (Brief Narrative):        Aurora Farias is a 86 y.o. female with past medical history history of UTI, aortic dissection s/p repair, hypothyroidism, femoral fracture s/p internal fixation, osteopenia, and frequent GLF's; who presents today, from her group home, due to weakness and dark urine. Patient was weak and was hard to comprehend due to stroke symptoms, daughter states that the group home staff called her about increased weakness. Patient mentioned some abdominal discomfort.    In the ED, UA was indicative of active UTI and CBC showed leukocytosis. Patient was started on rocephin with good response, leukocytosis resolved and mentation/weakness improved. Pt was discharged back to SNF to finish 7 day course of antibiotics via Bactrim for 4 further days with plan to follow up with PCP in 1 week for resolution of UTI.  PT/OT recommended PCP start home health PT/OT for patient at time of that visit.    Patient /Hospital Summary (Details -- Problem Oriented) :          UTI (urinary tract infection)   Assessment & Plan    - hx of frequent and several UTIs per daughter  - pt usually wears Depends in nursing home  - pt does not report dysuria/ hematuria/ CVA tenderness  - afebrile, WBC count 10 -->11.7-->13.5, trending upwards  - U/A shows numerous  WBCs, nitrite and LE positive  - lactic acid WNL  - SIRS 1/4 criteria with focus of infection    Plan  - IVF NS @ 50 cc/hr  - Rocephin 2 gm IV Q24H  - Urine C/S  - Blood C/S x 2  - catheterization  - trend CBC, w/f sepsis        Hypokalemia   Assessment & Plan    - K on presentation 3.4  - repleted Iv (KCl 40 mEq in NS)    Plan  - CTM        Meningioma (HCC)   Assessment & Plan    - interhemispheric extra axial mass anteriorly in cranium probably meningioma  - increased in size in past year  - most likely benign  - 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, 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    Plan  - watchful waiting, as it is not currently producing pressure effects  - consider dilated eye exam for papilledema        Ptosis of eyelid, left   Assessment & Plan    - new onset unilateral droop of eyelid per daughter, complete ptosis  - DIANE b/l  - anhidrosis could not be assessed, absent heterochromia  - mild enophthalmos present, no anisocoria  - frontalis, buccalis, orbicularis oris and occuli intact b/l  - hx of aortic aneurysm and aortic dissection  - 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 around arch of aorta  - pt also has incidentally found meningioma in parasagittal location, which could be compression CNVII in its intracranial course   and producing symptoms  - pt also has palpable neurofibroma in left side of neck        Dehydration   Assessment & Plan    - poor PO intake per hx  - serum Na 145, Hb 14  - BUN/Cr >20    Plan  - IVF NS @ 50 cc/hr infusion        Bradycardia   Assessment & Plan    - pt has been bradycardic since presentation  - HR in 50s and 60s  - BP trending WNL  - no prior hx of SSS / infiltrative heart d/o/ amyloidosis    Plan  - hold beta blocker        Hypothyroidism   Assessment & Plan    - continue OP medication Synthroid 100 mcg po Qdaily        Paroxysmal A-fib (HCC)   Assessment & Plan    - currently in sinus rhythm, occasional bradycardia    Plan  -  monitor vital signs        CVA (cerebral vascular accident) (Formerly [facility])   Assessment & Plan    - 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            Consultants:     None    Procedures:        None    Imaging/ Testing:      CT-HEAD W/O   Final Result         1.  No acute intracranial abnormality is identified, there are nonspecific white matter changes, commonly associated with small vessel ischemic disease.  Associated mild cerebral atrophy is noted.   2.  Interhemispheric extra-axial mass anteriorly, appearance favors meningioma. Appears somewhat increased in size since prior study.   3.  Atherosclerosis.      DX-CHEST-PORTABLE (1 VIEW)   Final Result         1.  No acute cardiopulmonary disease.   2.  6.6 cm aneurysm of the aortic arch, appears somewhat increased since prior. Radiographic follow-up as clinically appropriate.   3.  Atherosclerosis            Discharge Medications:         Medication Reconciliation: Completed       Medication List      ASK your doctor about these medications      Instructions   aspirin 81 MG tablet   Take 81 mg by mouth every evening.  Dose:  81 mg     atorvastatin 20 MG Tabs  Commonly known as:  LIPITOR   Take 20 mg by mouth every day.  Dose:  20 mg     CRANBERRY PLUS VITAMIN C 4200-20-3 MG-MG-UNIT Caps  Generic drug:  Cranberry-Vitamin C-Vitamin E   Take 1 Cap by mouth 4 times a day.  Dose:  1 Cap      levothyroxine 100 MCG Tabs  Commonly known as:  SYNTHROID   Take 100 mcg by mouth Every morning on an empty stomach.  Dose:  100 mcg     metoprolol 200 MG XL tablet  Commonly known as:  TOPROL-XL   Take 100 mg by mouth every bedtime.  Dose:  100 mg     polyethylene glycol/lytes Pack  Commonly known as:  MIRALAX   Take 17 g by mouth every day.  Dose:  17 g     therapeutic multivitamin-minerals Tabs   Take 1 Tab by mouth every day.  Dose:  1 Tab     valsartan-hydrochlorothiazide 160-12.5 MG per tablet  Commonly known as:  DIOVAN-HCT   Take 0.5 Tabs by mouth every day.  Dose:  0.5 Tab     Vitamin D 2000 units Caps   Take 1,000 Units by mouth every day.  Dose:  1000 Units             Dinora Aurora   Home Medication Instructions MALIA:[id number]    Printed on:09/10/18 1614   Medication Information                      aspirin 81 MG tablet  Take 81 mg by mouth every evening.             atorvastatin (LIPITOR) 20 MG Tab  Take 20 mg by mouth every day.             Cholecalciferol (VITAMIN D) 2000 units Cap  Take 1,000 Units by mouth every day.             Cranberry-Vitamin C-Vitamin E (CRANBERRY PLUS VITAMIN C) 4200-20-3 MG-MG-UNIT Cap  Take 1 Cap by mouth 4 times a day.             levothyroxine (SYNTHROID) 100 MCG Tab  Take 100 mcg by mouth Every morning on an empty stomach.             metoprolol (TOPROL-XL) 200 MG XL tablet  Take 100 mg by mouth every bedtime.             polyethylene glycol/lytes (MIRALAX) Pack  Take 17 g by mouth every day.             sulfamethoxazole-trimethoprim (BACTRIM DS) 800-160 MG tablet  Take 1 Tab by mouth 2 times a day for 4 days.             therapeutic multivitamin-minerals (THERAGRAN-M) Tab  Take 1 Tab by mouth every day.             valsartan-hydrochlorothiazide (DIOVAN-HCT) 160-12.5 MG per tablet  Take 0.5 Tabs by mouth every day.                   Disposition:   Discharge to [facility]    Diet:  Dysphagia    Activity:   None    Instructions:      The patient was instructed to return to  the ER in the event of worsening symptoms. I have counseled the patient on the importance of compliance and the patient has agreed to proceed with all medical recommendations and follow up plan indicated above.   The patient understands that all medications come with benefits and risks. Risks may include permanent injury or death and these risks can be minimized with close reassessment and monitoring.        Primary Care Provider:    Pcp Pt States None  Discharge summary faxed to primary care provider:  Deferred  Copy of discharge summary given to the patient: Deferred      Follow up appointment details :      Follow up with PCP in 1 week    Pending Studies:        None    Time spent on discharge day patient visit, preparing discharge paperwork and arranging for patient follow up.    Summary of follow up issues:   Follow up with PCP in 1 week    Discharge Time (Minutes) :    50  Hospital Course Type:  Inpatient Stay >2 midnights      Condition on Discharge    ______________________________________________________________________    Interval history/exam for day of discharge:     Pts mentation and labs appear improved today, appears to be responding to abx. Family contacted want no surgical interventions of aneurysm or meningioma. DC home today with abx.    Vitals:    09/08/18 1500 09/08/18 1751 09/08/18 1930 09/09/18 0355   BP: 156/60 (!) 155/37 151/42 158/46   Pulse: (!) 59 63 (!) 56 64   Resp: 16 20 19 17   Temp: 37 °C (98.6 °F) 36.2 °C (97.2 °F) 36.1 °C (97 °F) 35.8 °C (96.5 °F)   SpO2: 94% 97% 99% 97%   Weight:       Height:         Weight/BMI: Body mass index is 20.08 kg/m².  Pulse Oximetry: 97 %, O2 (LPM): 0, O2 Delivery: None (Room Air)    General: appears chronically ill  CVS: RRR, systolic 3/6 murmur  PULM: CTAB    Most Recent Labs:    Lab Results   Component Value Date/Time    WBC 8.2 09/09/2018 02:30 AM    RBC 4.89 09/09/2018 02:30 AM    HEMOGLOBIN 13.9 09/09/2018 02:30 AM    HEMATOCRIT 44.2 09/09/2018  02:30 AM    MCV 90.4 09/09/2018 02:30 AM    MCH 28.4 09/09/2018 02:30 AM    MCHC 31.4 (L) 09/09/2018 02:30 AM    MPV 10.6 09/09/2018 02:30 AM    NEUTSPOLYS 62.30 09/09/2018 02:30 AM    LYMPHOCYTES 27.00 09/09/2018 02:30 AM    MONOCYTES 6.80 09/09/2018 02:30 AM    EOSINOPHILS 2.30 09/09/2018 02:30 AM    BASOPHILS 0.40 09/09/2018 02:30 AM      Lab Results   Component Value Date/Time    SODIUM 141 09/09/2018 02:29 AM    POTASSIUM 3.9 09/09/2018 02:29 AM    CHLORIDE 115 (H) 09/09/2018 02:29 AM    CO2 20 09/09/2018 02:29 AM    GLUCOSE 85 09/09/2018 02:29 AM    BUN 9 09/09/2018 02:29 AM    CREATININE 0.45 (L) 09/09/2018 02:29 AM      Lab Results   Component Value Date/Time    ALTSGPT 7 09/09/2018 02:29 AM    ASTSGOT 12 09/09/2018 02:29 AM    ALKPHOSPHAT 64 09/09/2018 02:29 AM    TBILIRUBIN 1.9 (H) 09/09/2018 02:29 AM    ALBUMIN 2.7 (L) 09/09/2018 02:29 AM    GLOBULIN 2.7 09/09/2018 02:29 AM    INR 1.22 (H) 09/08/2017 06:21 PM     Lab Results   Component Value Date/Time    PROTHROMBTM 15.8 (H) 09/08/2017 06:21 PM    INR 1.22 (H) 09/08/2017 06:21 PM

## 2018-09-09 NOTE — PROGRESS NOTES
Assumed care of pt, received report from day shift RN, pt assessed.  Pt has no complaints of pain at this time.  Pt is A&O x2, disoriented to time and events.  Pt high fall risk, wearing treaded socks, bed locked and in lowest position, bed alarm is on.  Pt instructed to call for assistance prior to getting OOB, pt verbalized understanding, reinforcement needed.  Call light, phone, and personal belongings within reach.

## 2018-09-09 NOTE — PROGRESS NOTES
Internal Medicine Interval Note  Note Author: Julius Black M.D.     Name Aurora Farias 10/19/1931   Age/Sex 86 y.o. female   MRN [id number]   Code Status DNAR/DNI     After 5PM or if no immediate response to page, please call for cross-coverage  Attending/Team: Jeannie/Nas See Patient List for primary contact information  Call [phone] to page    1st Call - Day Intern (R1):   Dr. Julius Black 2nd Call - Day Sr. Resident (R2/R3):   Dr. Osmar Rae         Reason for interval visit  (Principal Problem)   UTI    Interval Problem Daily Status Update  (24 hours, problem oriented, brief subjective history, new lab/imaging data pertinent to that problem)     Patient admitted for UTI due to urinary complaints and positive UA. Patient not currently having any complaints. On ceftriaxone & 50cc NS MIVF. WBC continues to improve with antibiotics. Patient not tolerating her dyspahgia 2 diet per nursing report. Speech therapy consulted for swallow evaluation. Anticipate patient will go back to SNF for disposition.     Review of Systems   Constitutional: Negative for chills, diaphoresis, fever, malaise/fatigue and weight loss.   HENT: Negative for congestion, ear discharge, ear pain, hearing loss, nosebleeds, sinus pain, sore throat and tinnitus.    Respiratory: Negative for cough, hemoptysis, sputum production, shortness of breath, wheezing and stridor.    Cardiovascular: Negative for chest pain, palpitations, orthopnea, claudication and leg swelling.   Gastrointestinal: Negative for abdominal pain, blood in stool, constipation, diarrhea, heartburn, melena, nausea and vomiting.   Genitourinary: Negative for dysuria, flank pain, frequency, hematuria and urgency.   Neurological: Positive for focal weakness. Negative for dizziness, tingling, tremors, sensory change, speech change, seizures, loss of consciousness, weakness and headaches.   Psychiatric/Behavioral: Negative for depression,  hallucinations, memory loss, substance abuse and suicidal ideas. The patient is not nervous/anxious and does not have insomnia.        Disposition/Barriers to discharge:   Debility    Consultants/Specialty  None  PCP: Pcp Pt States None      Quality Measures  Quality-Core Measures        Physical Exam       Vitals:    09/08/18 1751 09/08/18 1930 09/09/18 0355 09/09/18 0715   BP: (!) 155/37 151/42 158/46 147/40   Pulse: 63 (!) 56 64 60   Resp: 20 19 17 16   Temp: 36.2 °C (97.2 °F) 36.1 °C (97 °F) 35.8 °C (96.5 °F) 36.1 °C (96.9 °F)   SpO2: 97% 99% 97% 95%   Weight:       Height:         Body mass index is 20.08 kg/m².    Oxygen Therapy:  Pulse Oximetry: 95 %, O2 (LPM): 0, O2 Delivery: None (Room Air)    Physical Exam   Constitutional: She is oriented to person, place, and time. Vital signs are normal. She appears dehydrated. She appears cachectic. No distress.   HENT:   Head: Normocephalic and atraumatic.   Mouth/Throat: Mucous membranes are dry. She has dentures.   Neck: No JVD present. No tracheal deviation present. No thyromegaly present.   Cardiovascular: Regular rhythm, S1 normal, S2 normal, intact distal pulses and normal pulses.  Bradycardia present.  Exam reveals no S3 and no S4.    Murmur heard.   Systolic murmur is present with a grade of 3/6   Pulmonary/Chest: Effort normal and breath sounds normal. No accessory muscle usage or stridor. No tachypnea and no bradypnea. No respiratory distress. She has no wheezes. She has no rales. She exhibits no tenderness.   Abdominal: She exhibits no distension and no mass. There is no tenderness. There is no rebound and no guarding.   Lymphadenopathy:     She has no cervical adenopathy.   Neurological: She is alert and oriented to person, place, and time. A cranial nerve deficit is present.   Skin: Skin is dry. No rash noted. She is not diaphoretic. No erythema. No pallor.   Psychiatric: Mood, memory, affect and judgment normal.             Assessment/Plan     UTI  (urinary tract infection)- (present on admission)   Assessment & Plan    - hx of frequent and several UTIs per daughter  - pt usually wears Depends in nursing home  - pt does not report dysuria/ hematuria/ CVA tenderness  - afebrile, WBC count currently 8.2  - U/A shows numerous WBCs, nitrite and LE positive  - lactic acid WNL  - SIRS 1/4 criteria with focus of infection    Plan  - IVF NS @ 50 cc/hr  - Rocephin 2 gm IV Q24H  - Urine C/S  - Blood C/S x 2  - catheterization  - trend CBC, w/f sepsis        Hypokalemia   Assessment & Plan    - K on presentation 3.4  - repleted Iv (KCl 40 mEq in NS)    Plan  - CTM        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    Plan  - watchful waiting, as it is not currently producing pressure effects  - consider dilated eye exam for papilledema        Ptosis of eyelid, left   Assessment & Plan    - new onset unilateral droop of eyelid per daughter, complete ptosis  - DIANE b/l  - anhidrosis could not be assessed, absent heterochromia  - mild enophthalmos present, no anisocoria  - frontalis, buccalis, orbicularis oris and occuli intact b/l  - hx of aortic aneurysm and aortic dissection  - 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 around arch of aorta  - pt also has incidentally found meningioma in parasagittal location, which could be compression CNVII in its intracranial course   and producing symptoms  - pt also has palpable neurofibroma in left side of neck        Dehydration   Assessment & Plan    - poor PO intake per hx  - serum Na 145, Hb 14  - BUN/Cr >20    Plan  - IVF NS @ 50 cc/hr infusion        Bradycardia   Assessment & Plan    - pt has been bradycardic since presentation  - HR in 50s and 60s  - BP trending WNL  -  no prior hx of SSS / infiltrative heart d/o/ amyloidosis    Plan  - hold beta blocker        Hypothyroidism   Assessment & Plan    - continue OP medication Synthroid 100 mcg po Qdaily        Paroxysmal A-fib ([facility])   Assessment & Plan    - currently in sinus rhythm, occasional bradycardia    Plan  - monitor vital signs        CVA (cerebral vascular accident) ([facility])   Assessment & Plan    - 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

## 2018-09-09 NOTE — CARE PLAN
Problem: Safety  Goal: Will remain free from falls  Outcome: PROGRESSING AS EXPECTED  Bed alarm on, bed locked in low position, treaded socks on, call light and items within reach. Hourly rounding in place, near nurses station. Assessed mobility and communicated with CNA and patient regarding risk of fall.   Educated pt on using call light and not getting up without assistance. Pt has verbalized understanding.     Problem: Skin Integrity  Goal: Risk for impaired skin integrity will decrease  Outcome: PROGRESSING AS EXPECTED  Waffle cushion mattress topper, pt refusing to get up to chair but has been up for meals, Q2 turns all day, bony prominences padded, heels floated, encouraged PO intake. Assessed skin thoroughly and ensured moisture was minimized.

## 2018-09-09 NOTE — PROGRESS NOTES
Bedside report received. Assumed care of pt.  Pt able to make needs known, some expressive aphasia 2/2 CVA defecit.  Pt AOx this morning, disoriented to time and event.  Pt shows no s/s of distress.  Breakfast given to patient sitting up in bed at 90 degree angle.  As RN was supervising breakfast, pt choked with nearly every bite or sip x5, RN removed tray and fed to patient, much less choking/coughing identified with 1:1 as RN was able to control bite size and pace of meal.  MD Black and family asked if the patients previous diet was just carried over into this admission or if pt has been seen by our SLP.  Recommended SLP Rafiq to MD Black.  Family also stated she went over dietary preferences, unsure if this was with dietitian or with dietary because pt had diced ham in eggs this morning and daughter stated she had thought they had removed all diced ham from her meals.  SLP Rafiq ordered.   Fall precautions in place, call light and belongings within reach. Q2 turns as ordered.  Plan of care discussed, all questions and concerns answered.  Hourly rounding in place.

## 2018-09-09 NOTE — CARE PLAN
Problem: Communication  Goal: The ability to communicate needs accurately and effectively will improve  Outcome: PROGRESSING SLOWER THAN EXPECTED  Pt does not utilize call light system, hourly rounding in place to insure all pt needs are being met.    Problem: Safety  Goal: Will remain free from falls  Outcome: PROGRESSING AS EXPECTED  Pt high fall risk, pt wearing treaded socks, bed locked and in lowest position, bed alarm is on.  Pt call light and phone within reach.

## 2018-09-09 NOTE — PROGRESS NOTES
2 RN skin check performed with Melquiades. No lines and or tubing. Hx of cva with left sided weakness. Patient turned and repositioned - will continue q 2 hrs + provide incontinent care prn. Mepilex present to sacrum, sacrum pink - blanchable. Scattered bruises and scabs throughout.

## 2018-09-10 LAB
ALBUMIN SERPL BCP-MCNC: 2.6 G/DL (ref 3.2–4.9)
ALBUMIN/GLOB SERPL: 1 G/DL
ALP SERPL-CCNC: 61 U/L (ref 30–99)
ALT SERPL-CCNC: 9 U/L (ref 2–50)
ANION GAP SERPL CALC-SCNC: 5 MMOL/L (ref 0–11.9)
AST SERPL-CCNC: 12 U/L (ref 12–45)
BASOPHILS # BLD AUTO: 0.5 % (ref 0–1.8)
BASOPHILS # BLD: 0.04 K/UL (ref 0–0.12)
BILIRUB SERPL-MCNC: 1.2 MG/DL (ref 0.1–1.5)
BUN SERPL-MCNC: 8 MG/DL (ref 8–22)
CALCIUM SERPL-MCNC: 8.7 MG/DL (ref 8.5–10.5)
CHLORIDE SERPL-SCNC: 114 MMOL/L (ref 96–112)
CO2 SERPL-SCNC: 22 MMOL/L (ref 20–33)
CREAT SERPL-MCNC: 0.44 MG/DL (ref 0.5–1.4)
EOSINOPHIL # BLD AUTO: 0.23 K/UL (ref 0–0.51)
EOSINOPHIL NFR BLD: 3 % (ref 0–6.9)
ERYTHROCYTE [DISTWIDTH] IN BLOOD BY AUTOMATED COUNT: 47.1 FL (ref 35.9–50)
GLOBULIN SER CALC-MCNC: 2.6 G/DL (ref 1.9–3.5)
GLUCOSE SERPL-MCNC: 89 MG/DL (ref 65–99)
HCT VFR BLD AUTO: 41.1 % (ref 37–47)
HGB BLD-MCNC: 13.3 G/DL (ref 12–16)
IMM GRANULOCYTES # BLD AUTO: 0.06 K/UL (ref 0–0.11)
IMM GRANULOCYTES NFR BLD AUTO: 0.8 % (ref 0–0.9)
LYMPHOCYTES # BLD AUTO: 2.18 K/UL (ref 1–4.8)
LYMPHOCYTES NFR BLD: 28.6 % (ref 22–41)
MCH RBC QN AUTO: 28.7 PG (ref 27–33)
MCHC RBC AUTO-ENTMCNC: 32.4 G/DL (ref 33.6–35)
MCV RBC AUTO: 88.6 FL (ref 81.4–97.8)
MONOCYTES # BLD AUTO: 0.55 K/UL (ref 0–0.85)
MONOCYTES NFR BLD AUTO: 7.2 % (ref 0–13.4)
NEUTROPHILS # BLD AUTO: 4.57 K/UL (ref 2–7.15)
NEUTROPHILS NFR BLD: 59.9 % (ref 44–72)
NRBC # BLD AUTO: 0 K/UL
NRBC BLD-RTO: 0 /100 WBC
PLATELET # BLD AUTO: 201 K/UL (ref 164–446)
PMV BLD AUTO: 10.5 FL (ref 9–12.9)
POTASSIUM SERPL-SCNC: 3.9 MMOL/L (ref 3.6–5.5)
PROT SERPL-MCNC: 5.2 G/DL (ref 6–8.2)
RBC # BLD AUTO: 4.64 M/UL (ref 4.2–5.4)
SODIUM SERPL-SCNC: 141 MMOL/L (ref 135–145)
WBC # BLD AUTO: 7.6 K/UL (ref 4.8–10.8)

## 2018-09-10 PROCEDURE — 97162 PT EVAL MOD COMPLEX 30 MIN: CPT

## 2018-09-10 PROCEDURE — G8988 SELF CARE GOAL STATUS: HCPCS | Mod: CI

## 2018-09-10 PROCEDURE — G8979 MOBILITY GOAL STATUS: HCPCS | Mod: CJ

## 2018-09-10 PROCEDURE — G8978 MOBILITY CURRENT STATUS: HCPCS | Mod: CL

## 2018-09-10 PROCEDURE — A9270 NON-COVERED ITEM OR SERVICE: HCPCS | Performed by: STUDENT IN AN ORGANIZED HEALTH CARE EDUCATION/TRAINING PROGRAM

## 2018-09-10 PROCEDURE — 700111 HCHG RX REV CODE 636 W/ 250 OVERRIDE (IP): Performed by: STUDENT IN AN ORGANIZED HEALTH CARE EDUCATION/TRAINING PROGRAM

## 2018-09-10 PROCEDURE — 36415 COLL VENOUS BLD VENIPUNCTURE: CPT

## 2018-09-10 PROCEDURE — G8987 SELF CARE CURRENT STATUS: HCPCS | Mod: CJ

## 2018-09-10 PROCEDURE — 85025 COMPLETE CBC W/AUTO DIFF WBC: CPT

## 2018-09-10 PROCEDURE — 700102 HCHG RX REV CODE 250 W/ 637 OVERRIDE(OP): Performed by: STUDENT IN AN ORGANIZED HEALTH CARE EDUCATION/TRAINING PROGRAM

## 2018-09-10 PROCEDURE — 700105 HCHG RX REV CODE 258: Performed by: STUDENT IN AN ORGANIZED HEALTH CARE EDUCATION/TRAINING PROGRAM

## 2018-09-10 PROCEDURE — 96366 THER/PROPH/DIAG IV INF ADDON: CPT

## 2018-09-10 PROCEDURE — 80053 COMPREHEN METABOLIC PANEL: CPT

## 2018-09-10 PROCEDURE — G0378 HOSPITAL OBSERVATION PER HR: HCPCS

## 2018-09-10 PROCEDURE — 99225 PR SUBSEQUENT OBSERVATION CARE,LEVEL II: CPT | Mod: GC | Performed by: INTERNAL MEDICINE

## 2018-09-10 PROCEDURE — 97166 OT EVAL MOD COMPLEX 45 MIN: CPT

## 2018-09-10 RX ORDER — SULFAMETHOXAZOLE AND TRIMETHOPRIM 800; 160 MG/1; MG/1
1 TABLET ORAL 2 TIMES DAILY
Qty: 8 TAB | Refills: 0 | Status: SHIPPED | OUTPATIENT
Start: 2018-09-10 | End: 2018-09-14

## 2018-09-10 RX ADMIN — ASPIRIN 81 MG: 81 TABLET, CHEWABLE ORAL at 18:00

## 2018-09-10 RX ADMIN — SENNOSIDES AND DOCUSATE SODIUM 2 TABLET: 8.6; 5 TABLET ORAL at 19:13

## 2018-09-10 RX ADMIN — ATORVASTATIN CALCIUM 20 MG: 20 TABLET, FILM COATED ORAL at 06:16

## 2018-09-10 RX ADMIN — VITAMIN D, TAB 1000IU (100/BT) 1000 UNITS: 25 TAB at 06:16

## 2018-09-10 RX ADMIN — CEFTRIAXONE SODIUM 2 G: 2 INJECTION, POWDER, FOR SOLUTION INTRAMUSCULAR; INTRAVENOUS at 06:15

## 2018-09-10 RX ADMIN — LEVOTHYROXINE SODIUM 100 MCG: 100 TABLET ORAL at 06:16

## 2018-09-10 RX ADMIN — ENOXAPARIN SODIUM 40 MG: 40 INJECTION SUBCUTANEOUS at 06:16

## 2018-09-10 RX ADMIN — MULTIPLE VITAMINS W/ MINERALS TAB 1 TABLET: TAB at 06:16

## 2018-09-10 RX ADMIN — SENNOSIDES AND DOCUSATE SODIUM 2 TABLET: 8.6; 5 TABLET ORAL at 06:16

## 2018-09-10 ASSESSMENT — COGNITIVE AND FUNCTIONAL STATUS - GENERAL
MOVING FROM LYING ON BACK TO SITTING ON SIDE OF FLAT BED: A LITTLE
EATING MEALS: A LOT
TURNING FROM BACK TO SIDE WHILE IN FLAT BAD: A LITTLE
HELP NEEDED FOR BATHING: A LOT
DRESSING REGULAR UPPER BODY CLOTHING: A LOT
MOBILITY SCORE: 14
DAILY ACTIVITIY SCORE: 11
MOVING TO AND FROM BED TO CHAIR: A LITTLE
SUGGESTED CMS G CODE MODIFIER MOBILITY: CL
STANDING UP FROM CHAIR USING ARMS: A LOT
SUGGESTED CMS G CODE MODIFIER DAILY ACTIVITY: CL
WALKING IN HOSPITAL ROOM: A LOT
DRESSING REGULAR LOWER BODY CLOTHING: TOTAL
CLIMB 3 TO 5 STEPS WITH RAILING: TOTAL
TOILETING: A LOT
PERSONAL GROOMING: A LOT

## 2018-09-10 ASSESSMENT — GAIT ASSESSMENTS
DISTANCE (FEET): 10
ASSISTIVE DEVICE: FRONT WHEEL WALKER
DEVIATION: SHUFFLED GAIT;DECREASED HEEL STRIKE;DECREASED TOE OFF
GAIT LEVEL OF ASSIST: MODERATE ASSIST

## 2018-09-10 ASSESSMENT — PAIN SCALES - GENERAL
PAINLEVEL_OUTOF10: 0
PAINLEVEL_OUTOF10: 0

## 2018-09-10 ASSESSMENT — ACTIVITIES OF DAILY LIVING (ADL): TOILETING: REQUIRES ASSIST

## 2018-09-10 NOTE — PROGRESS NOTES
Internal Medicine Interval Note  Note Author: Jose Rangel M.D.     Name Aurora Farias 10/19/1931   Age/Sex 86 y.o. female   MRN [id number]   Code Status DNAR/DNI     After 5PM or if no immediate response to page, please call for cross-coverage  Attending/Team: Woodrow Ware/Purple See Patient List for primary contact information  Call [phone] to page    1st Call - Day Intern (R1):   Dr.Mohamed Claire MD  2nd Call - Day Sr. Resident (R2/R3):   Dr.Patrick Rae.          Reason for interval visit  (Principal Problem)   Urinary tract infection.       Interval Problem Daily Status Update  (24 hours, problem oriented, brief subjective history, new lab/imaging data pertinent to that problem)     A 85 yo female patient with multiple comorbid conditions admitted for UTI on Ceftriaxone 2 gm iv. This morning, she denied nausea, vomiting, abdominal or loin pain. CBC today, normal WBCs count, S.creat 0.4, urine c/s positive for pan sensitive E.Coli. With improving over all general condition, she can be d/c today with plan for home health.      ROS   - Denies fever, chest pain, groin pain and no sob.     Disposition/Barriers to discharge:   Home with home health.     Consultants/Specialty  None   PCP: Pcp Pt States None      Quality Measures  Quality-Core Measures        Physical Exam       Vitals:    18 1930 09/10/18 0306 09/10/18 0705 09/10/18 1634   BP: 138/54 153/44 132/42 150/56   Pulse: 79 62 61 77   Resp: 17 16 17 17   Temp: 36.1 °C (97 °F) 36.1 °C (96.9 °F) 36.5 °C (97.7 °F) 36 °C (96.8 °F)   SpO2: 96% 96% 94% 96%   Weight:       Height:         Body mass index is 20.08 kg/m².    Oxygen Therapy:  Pulse Oximetry: 96 %, O2 (LPM): 0, O2 Delivery: None (Room Air)    Physical Exam    B.P: 153/44   T 3.6.1, R.R 16, HR 62  Physical Exam   Constitutional: She is oriented to person, place, and time. Vital signs are normal.   Head: Normocephalic and atraumatic. Lt eye lid ptosis.    Mouth/Throat: not congested  Neck: No JVD present. No tracheal deviation present. No thyromegaly present.   Cardiovascular: Regular rhythm, S1 normal, S2 normal, intact distal pulses and normal pulses.   Pulmonary/Chest: Effort normal and breath sounds normal. No accessory muscle usage or stridor. No tachypnea and no bradypnea. No respiratory distress. She has no wheezes. She has no rales. She exhibits no tenderness.   Abdominal: She exhibits no distension and no mass. There is no tenderness. There is no rebound and no guarding.   Lymphadenopathy: No cervical lymphadenopathy  Neurological: She is alert and oriented to person, place, and time. A cranial nerve deficit is present.   Skin: Skin is dry. No rash noted. She is not diaphoretic. No erythema. No pallor.   Psychiatric: Mood, memory, affect and judgment normal.        Assessment/Plan     UTI (urinary tract infection)- (present on admission)   Assessment & Plan    - Hx of frequent and several UTIs per daughter  - pt usually wears Depends in nursing home  - Today she denies dysuria/ hematuria/ CVA tenderness  - WBC count currently 7.6  - U/A shows numerous WBCs, nitrite and LE positive, Urine C/S positive for E.Coli. Blood c/s no growth to date  - Pan sensitive E.Coli    Plan  - D/C Iv fluids IVF NS @ 50 cc/hr  - D/C Rocephin   - D/C home on oral Abx today.         Hypokalemia   Assessment & Plan    - K on presentation 3.4   - repleted Iv (KCl 40 mEq in NS)  - Today 3.9.           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    Plan  - watchful waiting, as it is not currently producing pressure effects  - consider dilated eye exam for papilledema as outpatient        Ptosis of eyelid, left   Assessment & Plan    - new onset unilateral droop of  eyelid per daughter, complete ptosis  - DIANE b/l  - anhidrosis could not be assessed, absent heterochromia  - mild enophthalmos present, no anisocoria  - frontalis, buccalis, orbicularis oris and occuli intact b/l  - hx of aortic aneurysm and aortic dissection  - 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   - Today HR 62.           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   - Continue to follow up as outpatient         Falls frequently   Assessment & Plan    - hx of frequent falls per nursing home staff and daughter  - has not fallen in past year  - Home health for PT/OT         - Patient is for discharge today back to  with home health for PT/OT.

## 2018-09-10 NOTE — THERAPY
"Physical Therapy Evaluation completed.   Bed Mobility:  Supine to Sit: Contact Guard Assist  Transfers: Sit to Stand: Minimal Assist  Gait: Level Of Assist: Moderate Assist with Front-Wheel Walker       Plan of Care: Will benefit from Physical Therapy 2 times per week  Discharge Recommendations: Equipment: No Equipment Needed. Post-acute therapy Discharge to home with outpatient or home health for additional skilled therapy services.    See \"Rehab Therapy-Acute\" Patient Summary Report for complete documentation.       Pt is an 85 y/o female presenting with UTI and increased debility. Pt appears to be close to her baseline but is more debilitated and weak according to daughter who was present for evaluation. Pt would benefit from continued PT in acute setting to further work on ambulation and transfers as well as safety awareness. Pt should be able to return to group home at [facility] and recommend home health for strengthening and safety once back home.   "

## 2018-09-10 NOTE — CARE PLAN
Problem: Venous Thromboembolism (VTW)/Deep Vein Thrombosis (DVT) Prevention:  Goal: Patient will participate in Venous Thrombosis (VTE)/Deep Vein Thrombosis (DVT)Prevention Measures  Outcome: PROGRESSING AS EXPECTED  Pt on Lovenox for pharmacologic prophylaxis.    Problem: Bowel/Gastric:  Goal: Will not experience complications related to bowel motility  Outcome: PROGRESSING AS EXPECTED  LBM prior to arrival.  Bowel protocol started with milk of mag given on 09/09/2018

## 2018-09-10 NOTE — PROGRESS NOTES
Pt has not had BM for 4 days.  Pt given MOM last night with no effect.  PT notified Pt may need assistance with bowel movement when they helped transfer pt to chair.  Pt was returned back to bed after 45 minutes upright in chair and RN performed digital stimulation to remove impacted stool. Pt only partially disimpacted prior to RN ceasing, due to some discomfort.  Approximately 30-45 minutes later pt passed large, softball-shape/size formed, hard, brown stool.   Pt has been receiving stool softeners as ordered.

## 2018-09-10 NOTE — THERAPY
"Occupational Therapy Evaluation completed.   Plan of Care: Will benefit from Occupational Therapy 3 times per week  Discharge Recommendations:  Equipment: No Equipment Needed. Post-acute therapy Discharge to home with outpatient or home health for additional skilled therapy services.    Patient presents with UTI complicated by frequent falls and prior CVA resulting in R side deficits. Patient and dtr report A with ADLs and mobility with FWW / WC. Patient, upon eval, presents with decreased cognition per dtr, balance, endurance, tolerance, ADLs, and mobility necessitating Total A ADLs and Min A mobility with FWW, including physical cues for sequencing and directions. Patient would benefit from skilled OT in this setting followed by likely home with services, including therapy.     See \"Rehab Therapy-Acute\" Patient Summary Report for complete documentation.    "

## 2018-09-10 NOTE — DISCHARGE PLANNING
Medical Social Work    Anticipated Discharge Disposition: Group Home with Home Health     Action: LSW spoke with  from [facility] who states that she has still not received referral.  LSW left a message for MARIA Bond requesting referral be hand faxed to make sure that it goes through.     Barriers: Awaiting Home Health acceptance     Plan: Await Home Health acceptance

## 2018-09-10 NOTE — DISCHARGE PLANNING
Received Choice form at 9518  Agency/Facility Name: [facility]  Referral sent per Choice form @ [phone]

## 2018-09-10 NOTE — PROGRESS NOTES
Bedside report received. Assumed care of pt.  Pt able to make needs known, but is quiet with requests and only presses call light at times. Fluids being provided with hourly rounding.  Pt shows no s/s of distress.  Resting comfortably in bed.  Fall precautions in place, call light and belongings within reach. Plan of care discussed, all questions and concerns answered.  Hourly rounding in place.

## 2018-09-10 NOTE — FACE TO FACE
Face to Face Supporting Documentation - Home Health    The encounter with this patient was in whole or in part the primary reason for home health admission.    Date of encounter:   Patient:                    MRN:                       YOB: 2018  Aurora Farias  [id number]  10/19/1931     Home health to see patient for:  Physical Therapy evaluation and treatment and Occupational therapy evaluation and treatment     Skilled need for:  Exacerbation of Chronic Disease State stroke symptoms     Skilled nursing interventions to include:  Comment: PT/OT   ADL assistance    Homebound status evidenced by:  Needs the assistance of another person in order to leave the home. Leaving home requires a considerable and taxing effort. There is a normal inability to leave the home.    Community Physician to provide follow up care: Pcp Pt States None     Optional Interventions? No      I certify the face to face encounter for this home health care referral meets the CMS requirements and the encounter/clinical assessment with the patient was, in whole, or in part, for the medical condition(s) listed above, which is the primary reason for home health care. Based on my clinical findings: the service(s) are medically necessary, support the need for home health care, and the homebound criteria are met.  I certify that this patient has had a face to face encounter by myself.  Osmar Rae M.D. - NPI: [id number]

## 2018-09-10 NOTE — DISCHARGE PLANNING
Anticipated Discharge Disposition: TBD    Action: LSW paged [facility] FELIC[facility] to discuss discharge plan.    Barriers to Discharge: TBD    Plan: Await call from SHAI FELICIANO.

## 2018-09-10 NOTE — DISCHARGE PLANNING
Agency/Facility Name: [facility]  Spoke To: Paola  Outcome:  They have received referral and are reviewing insurance.  Per Paola she will return call as soon as they have acceptance by there manager.  Paola has been  Advised, patient is awaiting discharge.

## 2018-09-10 NOTE — DISCHARGE PLANNING
Anticipated Discharge Disposition: Home with Home Health    Action: LSW contacted SHAI FELICIANO and discussed Home Health per PT and OT notes. LSW informed SHAI FELICIANO will need face to face and order for home health. SHAI FELICIANO stated he would be on floor in 20 minutes and will discuss with LSW.     LSW met with pt daughter at bedside to discuss CHOICE for Home Health. LSW provided Medicare star rating. Daughter signed CHOICE for [facility]. Daughter is waiting for information from MD on discharge.      Barriers to Discharge: None    Plan: Await information on discharge from SHAI FELICIANO

## 2018-09-10 NOTE — DISCHARGE PLANNING
Anticipated Discharge Disposition: Group Home with Home Health    Action: LSW contacted [facility] for update on referral. [facility] does not show receiving referral. LSW contacted [facility] SIFUENT[facility], requested to send referral again. LSW requested House SW to follow up with [facility] for acceptance and provided nurse contact information.     Barriers to Discharge: None    Plan: Await [facility] acceptance.

## 2018-09-10 NOTE — DISCHARGE PLANNING
Anticipated Discharge Disposition: TBD    Action: LSW paged SHAI FELICIANO #2 to discuss discharge plan.    SHAI FELICIANO returned call. MD stated team will do rounds on floor soon. LSW requested team to discuss discharge needs with LSW at that time.    LSW met with pt and daughter, Treva at bedside. Daughter informed LSW Pt lives at [facility] in [place] and she buys thickening agent for group home to provide to pt. Pt uses walker in home, wheel chair for outings. Daughter will provided transport back to group home.     Barriers to Discharge: None identified    Plan: Await discharge plan from SHAI FELICIANO

## 2018-09-11 ENCOUNTER — PATIENT OUTREACH (OUTPATIENT)
Dept: HEALTH INFORMATION MANAGEMENT | Facility: OTHER | Age: 83
End: 2018-09-11

## 2018-09-11 VITALS
SYSTOLIC BLOOD PRESSURE: 148 MMHG | OXYGEN SATURATION: 99 % | DIASTOLIC BLOOD PRESSURE: 47 MMHG | HEIGHT: 63 IN | HEART RATE: 65 BPM | RESPIRATION RATE: 16 BRPM | WEIGHT: 113.32 LBS | BODY MASS INDEX: 20.08 KG/M2 | TEMPERATURE: 97.6 F

## 2018-09-11 LAB
ALBUMIN SERPL BCP-MCNC: 2.6 G/DL (ref 3.2–4.9)
ALBUMIN/GLOB SERPL: 1 G/DL
ALP SERPL-CCNC: 65 U/L (ref 30–99)
ALT SERPL-CCNC: 10 U/L (ref 2–50)
ANION GAP SERPL CALC-SCNC: 7 MMOL/L (ref 0–11.9)
AST SERPL-CCNC: 20 U/L (ref 12–45)
BASOPHILS # BLD AUTO: 0.5 % (ref 0–1.8)
BASOPHILS # BLD: 0.04 K/UL (ref 0–0.12)
BILIRUB SERPL-MCNC: 0.9 MG/DL (ref 0.1–1.5)
BUN SERPL-MCNC: 11 MG/DL (ref 8–22)
CALCIUM SERPL-MCNC: 9 MG/DL (ref 8.5–10.5)
CHLORIDE SERPL-SCNC: 111 MMOL/L (ref 96–112)
CO2 SERPL-SCNC: 21 MMOL/L (ref 20–33)
CREAT SERPL-MCNC: 0.44 MG/DL (ref 0.5–1.4)
EOSINOPHIL # BLD AUTO: 0.23 K/UL (ref 0–0.51)
EOSINOPHIL NFR BLD: 2.8 % (ref 0–6.9)
ERYTHROCYTE [DISTWIDTH] IN BLOOD BY AUTOMATED COUNT: 47.5 FL (ref 35.9–50)
GLOBULIN SER CALC-MCNC: 2.7 G/DL (ref 1.9–3.5)
GLUCOSE SERPL-MCNC: 88 MG/DL (ref 65–99)
HCT VFR BLD AUTO: 39.9 % (ref 37–47)
HGB BLD-MCNC: 12.9 G/DL (ref 12–16)
IMM GRANULOCYTES # BLD AUTO: 0.07 K/UL (ref 0–0.11)
IMM GRANULOCYTES NFR BLD AUTO: 0.9 % (ref 0–0.9)
LYMPHOCYTES # BLD AUTO: 2.47 K/UL (ref 1–4.8)
LYMPHOCYTES NFR BLD: 30.6 % (ref 22–41)
MCH RBC QN AUTO: 28.4 PG (ref 27–33)
MCHC RBC AUTO-ENTMCNC: 32.3 G/DL (ref 33.6–35)
MCV RBC AUTO: 87.9 FL (ref 81.4–97.8)
MONOCYTES # BLD AUTO: 0.65 K/UL (ref 0–0.85)
MONOCYTES NFR BLD AUTO: 8 % (ref 0–13.4)
NEUTROPHILS # BLD AUTO: 4.62 K/UL (ref 2–7.15)
NEUTROPHILS NFR BLD: 57.2 % (ref 44–72)
NRBC # BLD AUTO: 0 K/UL
NRBC BLD-RTO: 0 /100 WBC
PLATELET # BLD AUTO: 239 K/UL (ref 164–446)
PMV BLD AUTO: 10.5 FL (ref 9–12.9)
POTASSIUM SERPL-SCNC: 4.5 MMOL/L (ref 3.6–5.5)
PROT SERPL-MCNC: 5.3 G/DL (ref 6–8.2)
RBC # BLD AUTO: 4.54 M/UL (ref 4.2–5.4)
SODIUM SERPL-SCNC: 139 MMOL/L (ref 135–145)
WBC # BLD AUTO: 8.1 K/UL (ref 4.8–10.8)

## 2018-09-11 PROCEDURE — 700111 HCHG RX REV CODE 636 W/ 250 OVERRIDE (IP): Performed by: STUDENT IN AN ORGANIZED HEALTH CARE EDUCATION/TRAINING PROGRAM

## 2018-09-11 PROCEDURE — 700102 HCHG RX REV CODE 250 W/ 637 OVERRIDE(OP): Performed by: STUDENT IN AN ORGANIZED HEALTH CARE EDUCATION/TRAINING PROGRAM

## 2018-09-11 PROCEDURE — 99217 PR OBSERVATION CARE DISCHARGE: CPT | Mod: GC | Performed by: INTERNAL MEDICINE

## 2018-09-11 PROCEDURE — G0378 HOSPITAL OBSERVATION PER HR: HCPCS

## 2018-09-11 PROCEDURE — 700111 HCHG RX REV CODE 636 W/ 250 OVERRIDE (IP): Performed by: INTERNAL MEDICINE

## 2018-09-11 PROCEDURE — 90662 IIV NO PRSV INCREASED AG IM: CPT | Performed by: INTERNAL MEDICINE

## 2018-09-11 PROCEDURE — 700105 HCHG RX REV CODE 258: Performed by: STUDENT IN AN ORGANIZED HEALTH CARE EDUCATION/TRAINING PROGRAM

## 2018-09-11 PROCEDURE — 80053 COMPREHEN METABOLIC PANEL: CPT

## 2018-09-11 PROCEDURE — 36415 COLL VENOUS BLD VENIPUNCTURE: CPT

## 2018-09-11 PROCEDURE — A9270 NON-COVERED ITEM OR SERVICE: HCPCS | Performed by: STUDENT IN AN ORGANIZED HEALTH CARE EDUCATION/TRAINING PROGRAM

## 2018-09-11 PROCEDURE — 85025 COMPLETE CBC W/AUTO DIFF WBC: CPT

## 2018-09-11 PROCEDURE — 96366 THER/PROPH/DIAG IV INF ADDON: CPT

## 2018-09-11 PROCEDURE — 90471 IMMUNIZATION ADMIN: CPT

## 2018-09-11 RX ADMIN — SENNOSIDES AND DOCUSATE SODIUM 2 TABLET: 8.6; 5 TABLET ORAL at 06:50

## 2018-09-11 RX ADMIN — INFLUENZA A VIRUS A/MICHIGAN/45/2015 X-275 (H1N1) ANTIGEN (FORMALDEHYDE INACTIVATED), INFLUENZA A VIRUS A/SINGAPORE/INFIMH-16-0019/2016 IVR-186 (H3N2) ANTIGEN (FORMALDEHYDE INACTIVATED), AND INFLUENZA B VIRUS B/MARYLAND/15/2016 BX-69A (A B/COLORADO/6/2017-LIKE VIRUS) ANTIGEN (FORMALDEHYDE INACTIVATED) 0.5 ML: 60; 60; 60 INJECTION, SUSPENSION INTRAMUSCULAR at 10:00

## 2018-09-11 RX ADMIN — VITAMIN D, TAB 1000IU (100/BT) 1000 UNITS: 25 TAB at 06:49

## 2018-09-11 RX ADMIN — CEFTRIAXONE SODIUM 2 G: 2 INJECTION, POWDER, FOR SOLUTION INTRAMUSCULAR; INTRAVENOUS at 06:49

## 2018-09-11 RX ADMIN — ENOXAPARIN SODIUM 40 MG: 40 INJECTION SUBCUTANEOUS at 06:50

## 2018-09-11 RX ADMIN — ATORVASTATIN CALCIUM 20 MG: 20 TABLET, FILM COATED ORAL at 06:50

## 2018-09-11 RX ADMIN — LEVOTHYROXINE SODIUM 100 MCG: 100 TABLET ORAL at 06:50

## 2018-09-11 RX ADMIN — MULTIPLE VITAMINS W/ MINERALS TAB 1 TABLET: TAB at 06:49

## 2018-09-11 ASSESSMENT — PAIN SCALES - GENERAL: PAINLEVEL_OUTOF10: 0

## 2018-09-11 NOTE — PROGRESS NOTES
AVS reviewed with patient and daughter. Medications sent to preferred pharmacy. Pt discharging to group home with home health. IV d/c'd and site WNL. All questions answered and belongings sent with patient. Patient transported with daughter via wheelchair.

## 2018-09-11 NOTE — PROGRESS NOTES
Pt daughter was not available for transport 24/7, pt daughter was under the impression the discharge would be complete during the afternoon at the latest.  Pt daughter left the hospital and will not be available for transport until tomorrow.  She expressed disatisfaction with the lack of communication regarding discharge planning and agreed that it was not safe to discharge patient until [facility] accepted.  No acceptance at this time per notes.  Viktoria, on call PHYLLIS and Charge RN, Cynthia notified of update.

## 2018-09-11 NOTE — CARE PLAN
Problem: Infection  Goal: Will remain free from infection  Outcome: PROGRESSING AS EXPECTED  Pt on IV abx, showing no signs of new or worsening infection    Problem: Respiratory:  Goal: Respiratory status will improve  Outcome: PROGRESSING AS EXPECTED  Pt SPO2 93% on room air and has no complaints of shortness of breath or difficulty breathing.

## 2018-09-11 NOTE — DISCHARGE PLANNING
Anticipated Discharge Disposition: Group Home with Home Health.    Action: LSW contacted daughter to inform her [facility] accepted and pt is ready for discharge. Daughter will be at facility to provide transport.    LSW informed bedside nurse and [employer] MD of above.    Barriers to Discharge: None    Plan: No additional discharge needs.

## 2018-09-11 NOTE — DISCHARGE INSTRUCTIONS
Discharge Instructions    Discharged to home by car with relative. Discharged via wheelchair, hospital escort: Yes.  Special equipment needed: Not Applicable    Be sure to schedule a follow-up appointment with your primary care doctor or any specialists as instructed.     Discharge Plan:   Influenza Vaccine Indication: Indicated: 65 years and older    I understand that a diet low in cholesterol, fat, and sodium is recommended for good health. Unless I have been given specific instructions below for another diet, I accept this instruction as my diet prescription.   Other diet: chopped food with nectar thickened liquids    Special Instructions: None    · Is patient discharged on Warfarin / Coumadin?   No     Depression / Suicide Risk    As you are discharged from this [facility] facility, it is important to learn how to keep safe from harming yourself.    Recognize the warning signs:  · Abrupt changes in personality, positive or negative- including increase in energy   · Giving away possessions  · Change in eating patterns- significant weight changes-  positive or negative  · Change in sleeping patterns- unable to sleep or sleeping all the time   · Unwillingness or inability to communicate  · Depression  · Unusual sadness, discouragement and loneliness  · Talk of wanting to die  · Neglect of personal appearance   · Rebelliousness- reckless behavior  · Withdrawal from people/activities they love  · Confusion- inability to concentrate     If you or a loved one observes any of these behaviors or has concerns about self-harm, here's what you can do:  · Talk about it- your feelings and reasons for harming yourself  · Remove any means that you might use to hurt yourself (examples: pills, rope, extension cords, firearm)  · Get professional help from the community (Mental Health, Substance Abuse, psychological counseling)  · Do not be alone:Call your Safe Contact- someone whom you trust who will be there for you.  · Call  your local CRISIS HOTLINE 098-8103 or 167-973-6707  · Call your local Children's Mobile Crisis Response Team [place] (760) 278-1932 or www.[employer]  · Call the toll free National Suicide Prevention Hotlines   · National Suicide Prevention Lifeline 893-394-BODZ (2795)  · National Hope Line Network 800-SUICIDE (625-4186)

## 2018-09-11 NOTE — DISCHARGE PLANNING
Agency/Facility Name: [facility]  Spoke To: Fax notification   Outcome: Patient accepted. GAIL Gregory informed of acceptance.

## 2018-09-12 LAB
BACTERIA BLD CULT: NORMAL
BACTERIA BLD CULT: NORMAL
SIGNIFICANT IND 70042: NORMAL
SIGNIFICANT IND 70042: NORMAL
SITE SITE: NORMAL
SITE SITE: NORMAL
SOURCE SOURCE: NORMAL
SOURCE SOURCE: NORMAL

## 2018-09-12 NOTE — DISCHARGE PLANNING
Agency/Facility Name: [facility]  Spoke To:   Outcome: Needed confirmation on the signing doctor. Done

## 2018-09-12 NOTE — PROGRESS NOTES
Internal Medicine Interval Note  Note Author: Jose Rangel M.D.     Name Aurora Farias 10/19/1931   Age/Sex 86 y.o. female   MRN [id number]   Code Status DNAR/DNI      After 5PM or if no immediate response to page, please call for cross-coverage  Attending/Team: Woodrow Ware MD/[facility] team See Patient List for primary contact information  Call [phone] to page    1st Call - Day Intern (R1):   Jose Rangel MD  2nd Call - Day Sr. Resident (R2/R3):   Osmar Gomez MD          Reason for interval visit  (Principal Problem)   Urinary tract infection.       Interval Problem Daily Status Update  (24 hours, problem oriented, brief subjective history, new lab/imaging data pertinent to that problem)       A 85 yo female patient with  multiple comorbid conditions admitted for UTI on Ceftriaxone 2 gm iv. This morning, she denied nausea, vomiting, abdominal or loin pain. CBC today, normal WBCs count, S.creat 0.4, urine c/s positive for pan sensitive E.Coli.  With improving over all general condition. Patient has been d/c today back to  with [facility] accompanied by her daughter.       ROS  No headache, no sob or chest pain.  Disposition/Barriers to discharge:   None     Consultants/Specialty  None   PCP: Pcp Pt States None      Quality Measures  Quality-Core Measures        Physical Exam       Vitals:    09/10/18 1634 09/10/18 1835 18 0340 18 0720   BP: 150/56 147/61 159/55 148/47   Pulse: 77 78 (!) 56 65   Resp: 17 16 14 16   Temp: 36 °C (96.8 °F) 35.8 °C (96.5 °F) 35.9 °C (96.6 °F) 36.4 °C (97.6 °F)   SpO2: 96% 93% 95% 99%   Weight:       Height:         Body mass index is 20.08 kg/m².    Oxygen Therapy:  Pulse Oximetry: 99 %, O2 (LPM): 0, O2 Delivery: None (Room Air)    Physical Exam     Physical Exam   Constitutional: She is oriented to person, place, and time. Vital signs are normal.    Head: Normocephalic and atraumatic. Lt eye lid ptosis.   Mouth/Throat:  not congested  Neck: No JVD present. No tracheal deviation present. No thyromegaly present.   Cardiovascular: Regular rhythm, S1 normal, S2 normal, intact distal pulses and normal pulses.   Pulmonary/Chest: Effort normal and breath sounds normal. No accessory muscle usage or stridor. No tachypnea and no bradypnea. No respiratory distress. She has no wheezes. She has no rales. She exhibits no tenderness.   Abdominal: She exhibits no distension and no mass. There is no tenderness. There is no rebound and no guarding.    Lymphadenopathy: No cervical lymphadenopathy    Neurological: She is alert and oriented to person, place, and time. A cranial nerve deficit is present.    Skin: Skin is dry. No rash noted. She is not diaphoretic. No erythema. No pallor.    Psychiatric: Mood, memory, affect and judgment normal.          Assessment/Plan     UTI (urinary tract infection)- (present on admission)   Assessment & Plan    - Hx of frequent and several UTIs per daughter  - pt usually wears Depends in nursing home  - Today she denies dysuria/ hematuria/ CVA tenderness  - WBC count currently 7.6  - U/A shows numerous WBCs, nitrite and LE positive, Urine C/S positive for E.Coli. Blood c/s no growth to date  - Pan sensitive E.Coli    Plan  - D/C Iv fluids IVF NS @ 50 cc/hr  - D/C Rocephin   - D/C home on Bacterim to complete 7 days treatment of UTI blood c/s no growth to date.         Hypokalemia   Assessment & Plan    - K on presentation 3.4   - repleted Iv (KCl 40 mEq in NS)  - Today 3.9.           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    Plan  - watchful waiting, as it is not currently producing pressure effects  - consider dilated eye exam for papilledema as outpatient        Ptosis of eyelid, left    Assessment & Plan    - new onset unilateral droop of eyelid per daughter, complete ptosis  - DIANE b/l  - anhidrosis could not be assessed, absent heterochromia  - mild enophthalmos present, no anisocoria  - frontalis, buccalis, orbicularis oris and occuli intact b/l  - hx of aortic aneurysm and aortic dissection  - 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   - Today HR 62.           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   - Continue to follow up as outpatient         Falls frequently   Assessment & Plan    - hx of frequent falls per nursing home staff and daughter  - has not fallen in past year  - Home health for PT/OT

## 2018-09-12 NOTE — DISCHARGE SUMMARY
Internal Medicine Discharge Summary  Note Author: Woodrow Ware M.D.         Admit Date:  9/6/2018       Discharge Date:   09/11/18     Service:   [facility] Internal Medicine Purple Team  Attending Physician(s):   Dr Ware    Senior Resident(s):   Kyra  Benigno Resident(s):   Wayne  PCP: Pcp Pt States None        Primary Diagnosis:   UTI     Secondary Diagnoses:                Active Problems:    UTI (urinary tract infection) POA: Yes    Bradycardia POA: Unknown    Dehydration POA: Unknown    Ptosis of eyelid, left POA: Unknown    Meningioma (HCC) POA: Yes    Hypokalemia POA: Unknown  Resolved Problems:    * No resolved hospital problems. *        Hospital Summary (Brief Narrative):                   Aurora Farias is a 86 y.o. female with past medical history history of UTI, aortic dissection s/p repair, hypothyroidism, femoral fracture s/p internal fixation, osteopenia, and frequent GLF's; who presents today, from her group home, due to weakness and dark urine. Patient was weak and was hard to comprehend due to stroke symptoms, daughter states that the group home staff called her about increased weakness. Patient mentioned some abdominal discomfort.               In the ED, UA was indicative of active UTI and CBC showed leukocytosis. Patient was started on rocephin with good response, leukocytosis resolved and mentation/weakness improved. Pt was discharged back to SNF to finish 7 day course of antibiotics via Bactrim for 3 further days with plan to follow up with PCP in 1 week for resolution of UTI.  PT/OT recommended PCP start home health PT/OT for patient at time of that visit.     Patient /Hospital Summary (Details -- Problem Oriented) :               UTI (urinary tract infection)   Assessment & Plan     - hx of frequent and several UTIs per daughter  - pt usually wears Depends in nursing home  - pt does not report dysuria/ hematuria/ CVA tenderness  - afebrile, WBC count 10 -->11.7-->13.5, trending  upwards  - U/A shows numerous WBCs, nitrite and LE positive  - lactic acid WNL  - SIRS 1/4 criteria with focus of infection     Plan  - IVF NS @ 50 cc/hr  - Rocephin 2 gm IV Q24H  - Urine C/S  - Blood C/S x 2  - catheterization  - trend CBC, w/f sepsis          Hypokalemia   Assessment & Plan     - K on presentation 3.4  - repleted Iv (KCl 40 mEq in NS)     Plan  - CTM          Meningioma (HCC)   Assessment & Plan     - interhemispheric extra axial mass anteriorly in cranium probably meningioma  - increased in size in past year  - most likely benign  - 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, 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     Plan  - watchful waiting, as it is not currently producing pressure effects  - consider dilated eye exam for papilledema          Ptosis of eyelid, left   Assessment & Plan     - new onset unilateral droop of eyelid per daughter, complete ptosis  - DIANE b/l  - anhidrosis could not be assessed, absent heterochromia  - mild enophthalmos present, no anisocoria  - frontalis, buccalis, orbicularis oris and occuli intact b/l  - hx of aortic aneurysm and aortic dissection  - 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 around arch of aorta  - pt also has incidentally found meningioma in parasagittal location, which could be compression CNVII in its intracranial course   and producing symptoms  - pt also has palpable neurofibroma in left side of neck          Dehydration   Assessment & Plan     - poor PO intake per hx  - serum Na 145, Hb 14  - BUN/Cr >20     Plan  - IVF NS @ 50 cc/hr infusion          Bradycardia   Assessment & Plan     - pt has been bradycardic since presentation  - HR in 50s and 60s  - BP trending WNL  - no prior hx of SSS / infiltrative heart d/o/ amyloidosis     Plan  - hold beta blocker          Hypothyroidism   Assessment & Plan     - continue OP medication Synthroid 100 mcg po Qdaily          Paroxysmal A-fib (HCC)   Assessment & Plan     - currently  in sinus rhythm, occasional bradycardia     Plan  - monitor vital signs          CVA (cerebral vascular accident) (HCC)   Assessment & Plan     - 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  - some motor weakness in lower limb with extensor posturing on presentation  - DTRs brisk, sensory perception cannot be assessed at present (pt is somnolent and has dysarthria)  - pt able to ambulate with walker in group home     Plan  - consider PT/OT evaluation          Hypertension   Assessment & Plan     - BP trending in normal range, bradycardia present     Plan  - resume antihypertensives as clinically indicated          Falls frequently   Assessment & Plan     - hx of frequent falls per nursing home staff and daughter  - has not fallen in past year     Plan  - Fall precautions                Consultants:     None     Procedures:        None     Imaging/ Testing:      CT-HEAD W/O   Final Result           1.  No acute intracranial abnormality is identified, there are nonspecific white matter changes, commonly associated with small vessel ischemic disease.  Associated mild cerebral atrophy is noted.   2.  Interhemispheric extra-axial mass anteriorly, appearance favors meningioma. Appears somewhat increased in size since prior study.   3.  Atherosclerosis.       DX-CHEST-PORTABLE (1 VIEW)   Final Result           1.  No acute cardiopulmonary disease.   2.  6.6 cm aneurysm of the aortic arch, appears somewhat increased since prior. Radiographic follow-up as clinically appropriate.   3.  Atherosclerosis                Discharge Medications:         Medication Reconciliation: Completed          Medication List            ASK your doctor about these medications      Instructions   aspirin 81 MG tablet    Take 81 mg by mouth every evening.  Dose:  81 mg      atorvastatin 20 MG Tabs  Commonly known as:  LIPITOR    Take 20 mg by mouth every day.  Dose:  20 mg      CRANBERRY PLUS VITAMIN C 4200-20-3 MG-MG-UNIT  Caps  Generic drug:  Cranberry-Vitamin C-Vitamin E    Take 1 Cap by mouth 4 times a day.  Dose:  1 Cap      levothyroxine 100 MCG Tabs  Commonly known as:  SYNTHROID    Take 100 mcg by mouth Every morning on an empty stomach.  Dose:  100 mcg      metoprolol 200 MG XL tablet  Commonly known as:  TOPROL-XL    Take 100 mg by mouth every bedtime.  Dose:  100 mg      polyethylene glycol/lytes Pack  Commonly known as:  MIRALAX    Take 17 g by mouth every day.  Dose:  17 g      therapeutic multivitamin-minerals Tabs    Take 1 Tab by mouth every day.  Dose:  1 Tab      valsartan-hydrochlorothiazide 160-12.5 MG per tablet  Commonly known as:  DIOVAN-HCT    Take 0.5 Tabs by mouth every day.  Dose:  0.5 Tab      Vitamin D 2000 units Caps    Take 1,000 Units by mouth every day.  Dose:  1000 Units                              Aurora Farias   Home Medication Instructions MALIA:[id number]     Printed on:09/10/18 1614   Medication Information                                       aspirin 81 MG tablet  Take 81 mg by mouth every evening.                      atorvastatin (LIPITOR) 20 MG Tab  Take 20 mg by mouth every day.                      Cholecalciferol (VITAMIN D) 2000 units Cap  Take 1,000 Units by mouth every day.                      Cranberry-Vitamin C-Vitamin E (CRANBERRY PLUS VITAMIN C) 4200-20-3 MG-MG-UNIT Cap  Take 1 Cap by mouth 4 times a day.                      levothyroxine (SYNTHROID) 100 MCG Tab  Take 100 mcg by mouth Every morning on an empty stomach.                      metoprolol (TOPROL-XL) 200 MG XL tablet  Take 100 mg by mouth every bedtime.                      polyethylene glycol/lytes (MIRALAX) Pack  Take 17 g by mouth every day.                      sulfamethoxazole-trimethoprim (BACTRIM DS) 800-160 MG tablet  Take 1 Tab by mouth 2 times a day for 4 days.                      therapeutic multivitamin-minerals (THERAGRAN-M) Tab  Take 1 Tab by mouth every day.                       valsartan-hydrochlorothiazide (DIOVAN-HCT) 160-12.5 MG per tablet  Take 0.5 Tabs by mouth every day.                               Disposition:   Discharge to SNF     Diet:  Dysphagia     Activity:   None    Instructions:      The patient was instructed to return to the ER in the event of worsening symptoms. I have counseled the patient on the importance of compliance and the patient has agreed to proceed with all medical recommendations and follow up plan indicated above.   The patient understands that all medications come with benefits and risks. Risks may include permanent injury or death and these risks can be minimized with close reassessment and monitoring.         Primary Care Provider:    Pcp Pt States None  Discharge summary faxed to primary care provider:  Deferred  Copy of discharge summary given to the patient: Deferred        Follow up appointment details :      Follow up with PCP in 1 week     Pending Studies:        None     Time spent on discharge day patient visit, preparing discharge paperwork and arranging for patient follow up.     Summary of follow up issues:   Follow up with PCP in 1 week     Discharge Time (Minutes) :    50  Hospital Course Type:  Inpatient Stay >2 midnights        Condition on Discharge    ______________________________________________________________________     Interval history/exam for day of discharge:     Pts mentation and labs appear improved today, appears to be responding to abx. Family contacted want no surgical interventions of aneurysm or meningioma. DC home today with abx with [facility].     Vitals          Vitals:     09/08/18 1500 09/08/18 1751 09/08/18 1930 09/09/18 0355   BP: 156/60 (!) 155/37 151/42 158/46   Pulse: (!) 59 63 (!) 56 64   Resp: 16 20 19 17   Temp: 37 °C (98.6 °F) 36.2 °C (97.2 °F) 36.1 °C (97 °F) 35.8 °C (96.5 °F)   SpO2: 94% 97% 99% 97%   Weight:           Height:                 Weight/BMI: Body mass index is 20.08 kg/m².  Pulse Oximetry: 97  %, O2 (LPM): 0, O2 Delivery: None (Room Air)     General: appears chronically ill  CVS: RRR, systolic 3/6 murmur  PULM: CTAB     Most Recent Labs:          Lab Results   Component Value Date/Time     WBC 8.2 09/09/2018 02:30 AM     RBC 4.89 09/09/2018 02:30 AM     HEMOGLOBIN 13.9 09/09/2018 02:30 AM     HEMATOCRIT 44.2 09/09/2018 02:30 AM     MCV 90.4 09/09/2018 02:30 AM     MCH 28.4 09/09/2018 02:30 AM     MCHC 31.4 (L) 09/09/2018 02:30 AM     MPV 10.6 09/09/2018 02:30 AM     NEUTSPOLYS 62.30 09/09/2018 02:30 AM     LYMPHOCYTES 27.00 09/09/2018 02:30 AM     MONOCYTES 6.80 09/09/2018 02:30 AM     EOSINOPHILS 2.30 09/09/2018 02:30 AM     BASOPHILS 0.40 09/09/2018 02:30 AM            Lab Results   Component Value Date/Time     SODIUM 141 09/09/2018 02:29 AM     POTASSIUM 3.9 09/09/2018 02:29 AM     CHLORIDE 115 (H) 09/09/2018 02:29 AM     CO2 20 09/09/2018 02:29 AM     GLUCOSE 85 09/09/2018 02:29 AM     BUN 9 09/09/2018 02:29 AM     CREATININE 0.45 (L) 09/09/2018 02:29 AM            Lab Results   Component Value Date/Time     ALTSGPT 7 09/09/2018 02:29 AM     ASTSGOT 12 09/09/2018 02:29 AM     ALKPHOSPHAT 64 09/09/2018 02:29 AM     TBILIRUBIN 1.9 (H) 09/09/2018 02:29 AM     ALBUMIN 2.7 (L) 09/09/2018 02:29 AM     GLOBULIN 2.7 09/09/2018 02:29 AM     INR 1.22 (H) 09/08/2017 06:21 PM            Lab Results   Component Value Date/Time     PROTHROMBTM 15.8 (H) 09/08/2017 06:21 PM     INR 1.22 (H) 09/08/2017 06:21 PM

## 2019-04-08 ENCOUNTER — OFFICE VISIT (OUTPATIENT)
Dept: URGENT CARE | Facility: CLINIC | Age: 84
End: 2019-04-08
Payer: COMMERCIAL

## 2019-04-08 VITALS
RESPIRATION RATE: 18 BRPM | DIASTOLIC BLOOD PRESSURE: 68 MMHG | WEIGHT: 125 LBS | SYSTOLIC BLOOD PRESSURE: 102 MMHG | HEART RATE: 84 BPM | BODY MASS INDEX: 24.54 KG/M2 | TEMPERATURE: 97.2 F | HEIGHT: 60 IN | OXYGEN SATURATION: 97 %

## 2019-04-08 DIAGNOSIS — L03.113 CELLULITIS OF RIGHT HAND: ICD-10-CM
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
## 2019-04-08 DIAGNOSIS — S61.401A OPEN WOUND OF RIGHT HAND WITHOUT FOREIGN BODY, UNSPECIFIED WOUND TYPE, INITIAL ENCOUNTER: ICD-10-CM

## 2019-04-08 PROCEDURE — 99214 OFFICE O/P EST MOD 30 MIN: CPT | Mod: 25 | Performed by: NURSE PRACTITIONER

## 2019-04-08 RX ORDER — DOXYCYCLINE HYCLATE 100 MG/1
100 CAPSULE ORAL 2 TIMES DAILY
Qty: 14 CAP | Refills: 0 | Status: SHIPPED | OUTPATIENT
Start: 2019-04-08 | End: 2019-04-08 | Stop reason: SDUPTHER

## 2019-04-08 RX ORDER — DOXYCYCLINE HYCLATE 100 MG/1
100 CAPSULE ORAL 2 TIMES DAILY
Qty: 14 CAP | Refills: 0 | Status: SHIPPED | OUTPATIENT
Start: 2019-04-08 | End: 2019-04-15

## 2019-04-08 ASSESSMENT — ENCOUNTER SYMPTOMS
CHILLS: 0
MYALGIAS: 0
VOMITING: 0
FEVER: 0
NAUSEA: 0

## 2019-04-08 NOTE — LETTER
April 8, 2019        Aurora Farias  [address] [place] [address]        Please ensure that Ms. Farias's wound is cleaned twice daily with soap and water and redressed. Thank you.    If you have any questions or concerns, please don't hesitate to call.        Sincerely,        BERT AcevedoPYONAS.    Electronically Signed

## 2019-04-08 NOTE — PROGRESS NOTES
Subjective:      Aurora Farias is a 87 y.o. female who presents with Laceration ((R) hand fell on Saturday morning)            HPI New. 87 year old female who suffered a ground level fall on Friday and sustained open wound to dorsum of right hand. She is presenting with her daughter who is giving history. Patient has wound bandaged with erythema moving to mid arm area. She has no fever, chills, other injuries. No idea of the care she has received at facility where she resides. Bandage looks clean with some bloody discharge noted.  Penicillins  Current Outpatient Prescriptions on File Prior to Visit   Medication Sig Dispense Refill   • therapeutic multivitamin-minerals (THERAGRAN-M) Tab Take 1 Tab by mouth every day.     • valsartan-hydrochlorothiazide (DIOVAN-HCT) 160-12.5 MG per tablet Take 0.5 Tabs by mouth every day.     • polyethylene glycol/lytes (MIRALAX) Pack Take 17 g by mouth every day.     • metoprolol (TOPROL-XL) 200 MG XL tablet Take 100 mg by mouth every bedtime.     • aspirin 81 MG tablet Take 81 mg by mouth every evening.     • Cranberry-Vitamin C-Vitamin E (CRANBERRY PLUS VITAMIN C) 4200-20-3 MG-MG-UNIT Cap Take 1 Cap by mouth 4 times a day.     • levothyroxine (SYNTHROID) 100 MCG Tab Take 100 mcg by mouth Every morning on an empty stomach.     • Cholecalciferol (VITAMIN D) 2000 units Cap Take 1,000 Units by mouth every day.     • atorvastatin (LIPITOR) 20 MG Tab Take 20 mg by mouth every day.       No current facility-administered medications on file prior to visit.      Social History     Social History   • Marital status:      Spouse name: N/A   • Number of children: N/A   • Years of education: N/A     Occupational History   • Not on file.     Social History Main Topics   • Smoking status: Never Smoker   • Smokeless tobacco: Never Used   • Alcohol use No   • Drug use: No   • Sexual activity: Not on file     Other Topics Concern   • Not on file     Social History Narrative   • No  narrative on file     family history is not on file.      Review of Systems   Constitutional: Negative for chills, fever and malaise/fatigue.   Gastrointestinal: Negative for nausea and vomiting.   Musculoskeletal: Negative for myalgias.   Skin:        Open wound dorsum right hand.          Objective:     /68 (BP Location: Left arm, Patient Position: Sitting, BP Cuff Size: Adult)   Pulse 84   Temp 36.2 °C (97.2 °F) (Temporal)   Resp 18   Ht 1.524 m (5')   Wt 56.7 kg (125 lb)   SpO2 97%   BMI 24.41 kg/m²      Physical Exam   Constitutional: She is oriented to person, place, and time. She appears well-developed and well-nourished. No distress.   Non toxic appearance. VSS   Cardiovascular: Normal rate, regular rhythm and normal heart sounds.    No murmur heard.  Pulmonary/Chest: Effort normal and breath sounds normal. No respiratory distress.   Musculoskeletal: Normal range of motion.   Neurological: She is alert and oriented to person, place, and time. She exhibits normal muscle tone.   Bilaterally equal  strength   Skin: Skin is warm and dry. There is erythema.   1 cm open wound to right hand, dorsum. Healing. She has erythema proximal to this wound that travels up to mid forearm.   Psychiatric: She has a normal mood and affect. Her behavior is normal. Thought content normal.   Nursing note and vitals reviewed.              Assessment/Plan:     1. Open wound of right hand without foreign body, unspecified wound type, initial encounter     2. Cellulitis of right hand  doxycycline (VIBRAMYCIN) 100 MG Cap    cefTRIAXone (ROCEPHIN) 500 mg, lidocaine (XYLOCAINE) 1 % 1.8 mL for IM use   3. Fall from ground level       Rocephin here in clinic.  Watch for fever or extension of erythema. If she displays this, follow up immediately to ED. Area outlined with sharpee.

## 2020-02-07 ENCOUNTER — APPOINTMENT (OUTPATIENT)
Dept: RADIOLOGY | Facility: MEDICAL CENTER | Age: 85
DRG: 689 | End: 2020-02-07
Attending: EMERGENCY MEDICINE
Payer: COMMERCIAL

## 2020-02-07 ENCOUNTER — HOSPITAL ENCOUNTER (INPATIENT)
Facility: MEDICAL CENTER | Age: 85
LOS: 5 days | DRG: 689 | End: 2020-02-12
Attending: EMERGENCY MEDICINE | Admitting: INTERNAL MEDICINE
Payer: COMMERCIAL

## 2020-02-07 DIAGNOSIS — N39.0 COMPLICATED UTI (URINARY TRACT INFECTION): ICD-10-CM

## 2020-02-07 DIAGNOSIS — R53.1 WEAKNESS GENERALIZED: ICD-10-CM

## 2020-02-07 DIAGNOSIS — I71.20 THORACIC AORTIC ANEURYSM WITHOUT RUPTURE (HCC): ICD-10-CM

## 2020-02-07 DIAGNOSIS — F03.90 DEMENTIA WITHOUT BEHAVIORAL DISTURBANCE, UNSPECIFIED DEMENTIA TYPE: ICD-10-CM

## 2020-02-07 DIAGNOSIS — N12 PYELONEPHRITIS: ICD-10-CM

## 2020-02-07 DIAGNOSIS — D32.9 MENINGIOMA (HCC): ICD-10-CM

## 2020-02-07 DIAGNOSIS — R29.6 FALLS FREQUENTLY: ICD-10-CM

## 2020-02-07 DIAGNOSIS — I48.0 PAROXYSMAL A-FIB (HCC): ICD-10-CM

## 2020-02-07 DIAGNOSIS — J81.1 CHRONIC PULMONARY EDEMA: ICD-10-CM

## 2020-02-07 DIAGNOSIS — R41.0 DISORIENTATION: ICD-10-CM

## 2020-02-07 DIAGNOSIS — F02.80 DEMENTIA ASSOCIATED WITH OTHER UNDERLYING DISEASE WITHOUT BEHAVIORAL DISTURBANCE (HCC): ICD-10-CM

## 2020-02-07 LAB
ALBUMIN SERPL BCP-MCNC: 3.3 G/DL (ref 3.2–4.9)
ALBUMIN/GLOB SERPL: 1.3 G/DL
ALP SERPL-CCNC: 86 U/L (ref 30–99)
ALT SERPL-CCNC: 13 U/L (ref 2–50)
AMMONIA PLAS-SCNC: 37 UMOL/L (ref 11–45)
ANION GAP SERPL CALC-SCNC: 6 MMOL/L (ref 0–11.9)
APPEARANCE UR: ABNORMAL
AST SERPL-CCNC: 14 U/L (ref 12–45)
BACTERIA #/AREA URNS HPF: ABNORMAL /HPF
BASOPHILS # BLD AUTO: 0.4 % (ref 0–1.8)
BASOPHILS # BLD: 0.03 K/UL (ref 0–0.12)
BILIRUB SERPL-MCNC: 3 MG/DL (ref 0.1–1.5)
BILIRUB UR QL STRIP.AUTO: NEGATIVE
BLOOD CULTURE HOLD CXBCH: NORMAL
BUN SERPL-MCNC: 16 MG/DL (ref 8–22)
CALCIUM SERPL-MCNC: 9 MG/DL (ref 8.5–10.5)
CHLORIDE SERPL-SCNC: 107 MMOL/L (ref 96–112)
CO2 SERPL-SCNC: 26 MMOL/L (ref 20–33)
COLOR UR: ABNORMAL
CREAT SERPL-MCNC: 0.56 MG/DL (ref 0.5–1.4)
EKG IMPRESSION: NORMAL
EOSINOPHIL # BLD AUTO: 0.03 K/UL (ref 0–0.51)
EOSINOPHIL NFR BLD: 0.4 % (ref 0–6.9)
EPI CELLS #/AREA URNS HPF: NEGATIVE /HPF
ERYTHROCYTE [DISTWIDTH] IN BLOOD BY AUTOMATED COUNT: 50.4 FL (ref 35.9–50)
GLOBULIN SER CALC-MCNC: 2.5 G/DL (ref 1.9–3.5)
GLUCOSE SERPL-MCNC: 106 MG/DL (ref 65–99)
GLUCOSE UR STRIP.AUTO-MCNC: NEGATIVE MG/DL
HCT VFR BLD AUTO: 41 % (ref 37–47)
HGB BLD-MCNC: 13.2 G/DL (ref 12–16)
HYALINE CASTS #/AREA URNS LPF: ABNORMAL /LPF
IMM GRANULOCYTES # BLD AUTO: 0.02 K/UL (ref 0–0.11)
IMM GRANULOCYTES NFR BLD AUTO: 0.3 % (ref 0–0.9)
KETONES UR STRIP.AUTO-MCNC: NEGATIVE MG/DL
LEUKOCYTE ESTERASE UR QL STRIP.AUTO: ABNORMAL
LYMPHOCYTES # BLD AUTO: 0.47 K/UL (ref 1–4.8)
LYMPHOCYTES NFR BLD: 7 % (ref 22–41)
MCH RBC QN AUTO: 29.1 PG (ref 27–33)
MCHC RBC AUTO-ENTMCNC: 32.2 G/DL (ref 33.6–35)
MCV RBC AUTO: 90.3 FL (ref 81.4–97.8)
MICRO URNS: ABNORMAL
MONOCYTES # BLD AUTO: 0.57 K/UL (ref 0–0.85)
MONOCYTES NFR BLD AUTO: 8.5 % (ref 0–13.4)
NEUTROPHILS # BLD AUTO: 5.62 K/UL (ref 2–7.15)
NEUTROPHILS NFR BLD: 83.4 % (ref 44–72)
NITRITE UR QL STRIP.AUTO: POSITIVE
NRBC # BLD AUTO: 0 K/UL
NRBC BLD-RTO: 0 /100 WBC
PH UR STRIP.AUTO: 7.5 [PH] (ref 5–8)
PLATELET # BLD AUTO: 156 K/UL (ref 164–446)
PMV BLD AUTO: 9.9 FL (ref 9–12.9)
POTASSIUM SERPL-SCNC: 4 MMOL/L (ref 3.6–5.5)
PROT SERPL-MCNC: 5.8 G/DL (ref 6–8.2)
PROT UR QL STRIP: 100 MG/DL
RBC # BLD AUTO: 4.54 M/UL (ref 4.2–5.4)
RBC # URNS HPF: >150 /HPF
RBC UR QL AUTO: ABNORMAL
SODIUM SERPL-SCNC: 139 MMOL/L (ref 135–145)
SP GR UR STRIP.AUTO: 1.02
TROPONIN T SERPL-MCNC: 13 NG/L (ref 6–19)
UROBILINOGEN UR STRIP.AUTO-MCNC: 1 MG/DL
WBC # BLD AUTO: 6.7 K/UL (ref 4.8–10.8)
WBC #/AREA URNS HPF: ABNORMAL /HPF

## 2020-02-07 PROCEDURE — 87086 URINE CULTURE/COLONY COUNT: CPT

## 2020-02-07 PROCEDURE — 700105 HCHG RX REV CODE 258: Performed by: EMERGENCY MEDICINE

## 2020-02-07 PROCEDURE — 700111 HCHG RX REV CODE 636 W/ 250 OVERRIDE (IP): Performed by: EMERGENCY MEDICINE

## 2020-02-07 PROCEDURE — 87040 BLOOD CULTURE FOR BACTERIA: CPT

## 2020-02-07 PROCEDURE — 700105 HCHG RX REV CODE 258: Performed by: INTERNAL MEDICINE

## 2020-02-07 PROCEDURE — 70450 CT HEAD/BRAIN W/O DYE: CPT

## 2020-02-07 PROCEDURE — 71045 X-RAY EXAM CHEST 1 VIEW: CPT

## 2020-02-07 PROCEDURE — 80053 COMPREHEN METABOLIC PANEL: CPT

## 2020-02-07 PROCEDURE — 82140 ASSAY OF AMMONIA: CPT

## 2020-02-07 PROCEDURE — 99223 1ST HOSP IP/OBS HIGH 75: CPT | Performed by: INTERNAL MEDICINE

## 2020-02-07 PROCEDURE — 93005 ELECTROCARDIOGRAM TRACING: CPT | Performed by: EMERGENCY MEDICINE

## 2020-02-07 PROCEDURE — 85025 COMPLETE CBC W/AUTO DIFF WBC: CPT

## 2020-02-07 PROCEDURE — 87186 SC STD MICRODIL/AGAR DIL: CPT

## 2020-02-07 PROCEDURE — 81001 URINALYSIS AUTO W/SCOPE: CPT

## 2020-02-07 PROCEDURE — 84484 ASSAY OF TROPONIN QUANT: CPT

## 2020-02-07 PROCEDURE — 99285 EMERGENCY DEPT VISIT HI MDM: CPT

## 2020-02-07 PROCEDURE — 87077 CULTURE AEROBIC IDENTIFY: CPT

## 2020-02-07 PROCEDURE — 770006 HCHG ROOM/CARE - MED/SURG/GYN SEMI*

## 2020-02-07 RX ORDER — POLYETHYLENE GLYCOL 3350 17 G/17G
1 POWDER, FOR SOLUTION ORAL
Status: DISCONTINUED | OUTPATIENT
Start: 2020-02-07 | End: 2020-02-09

## 2020-02-07 RX ORDER — METOPROLOL SUCCINATE 100 MG/1
100 TABLET, EXTENDED RELEASE ORAL
Status: DISCONTINUED | OUTPATIENT
Start: 2020-02-07 | End: 2020-02-08

## 2020-02-07 RX ORDER — ACETAMINOPHEN 325 MG/1
650 TABLET ORAL EVERY 6 HOURS PRN
Status: DISCONTINUED | OUTPATIENT
Start: 2020-02-07 | End: 2020-02-11

## 2020-02-07 RX ORDER — VALSARTAN AND HYDROCHLOROTHIAZIDE 160; 12.5 MG/1; MG/1
0.5 TABLET, FILM COATED ORAL EVERY MORNING
Status: DISCONTINUED | OUTPATIENT
Start: 2020-02-08 | End: 2020-02-07

## 2020-02-07 RX ORDER — AMOXICILLIN 250 MG
2 CAPSULE ORAL 2 TIMES DAILY
Status: DISCONTINUED | OUTPATIENT
Start: 2020-02-07 | End: 2020-02-09

## 2020-02-07 RX ORDER — ATORVASTATIN CALCIUM 20 MG/1
20 TABLET, FILM COATED ORAL EVERY MORNING
Status: DISCONTINUED | OUTPATIENT
Start: 2020-02-08 | End: 2020-02-11

## 2020-02-07 RX ORDER — LEVOTHYROXINE SODIUM 0.1 MG/1
100 TABLET ORAL
Status: DISCONTINUED | OUTPATIENT
Start: 2020-02-08 | End: 2020-02-11

## 2020-02-07 RX ORDER — VALSARTAN 80 MG/1
80 TABLET ORAL
Status: DISCONTINUED | OUTPATIENT
Start: 2020-02-08 | End: 2020-02-11

## 2020-02-07 RX ORDER — SODIUM CHLORIDE, SODIUM LACTATE, POTASSIUM CHLORIDE, CALCIUM CHLORIDE 600; 310; 30; 20 MG/100ML; MG/100ML; MG/100ML; MG/100ML
INJECTION, SOLUTION INTRAVENOUS CONTINUOUS
Status: DISCONTINUED | OUTPATIENT
Start: 2020-02-07 | End: 2020-02-09

## 2020-02-07 RX ORDER — METOPROLOL SUCCINATE 100 MG/1
100 TABLET, EXTENDED RELEASE ORAL
Status: ON HOLD | COMMUNITY
End: 2020-02-12

## 2020-02-07 RX ORDER — NITROFURANTOIN MACROCRYSTALS 50 MG/1
50 CAPSULE ORAL EVERY MORNING
Status: ON HOLD | COMMUNITY
End: 2020-02-12

## 2020-02-07 RX ORDER — ONDANSETRON 4 MG/1
4 TABLET, ORALLY DISINTEGRATING ORAL EVERY 4 HOURS PRN
Status: DISCONTINUED | OUTPATIENT
Start: 2020-02-07 | End: 2020-02-11

## 2020-02-07 RX ORDER — BISACODYL 10 MG
10 SUPPOSITORY, RECTAL RECTAL
Status: DISCONTINUED | OUTPATIENT
Start: 2020-02-07 | End: 2020-02-09

## 2020-02-07 RX ORDER — ENALAPRILAT 1.25 MG/ML
1.25 INJECTION INTRAVENOUS EVERY 6 HOURS PRN
Status: DISCONTINUED | OUTPATIENT
Start: 2020-02-07 | End: 2020-02-11

## 2020-02-07 RX ORDER — HYDROCHLOROTHIAZIDE 25 MG/1
6.25 TABLET ORAL
Status: DISCONTINUED | OUTPATIENT
Start: 2020-02-08 | End: 2020-02-11

## 2020-02-07 RX ORDER — SODIUM CHLORIDE 9 MG/ML
1000 INJECTION, SOLUTION INTRAVENOUS ONCE
Status: COMPLETED | OUTPATIENT
Start: 2020-02-07 | End: 2020-02-07

## 2020-02-07 RX ORDER — ONDANSETRON 2 MG/ML
4 INJECTION INTRAMUSCULAR; INTRAVENOUS EVERY 4 HOURS PRN
Status: DISCONTINUED | OUTPATIENT
Start: 2020-02-07 | End: 2020-02-11

## 2020-02-07 RX ADMIN — CEFTRIAXONE SODIUM 2 G: 2 INJECTION, POWDER, FOR SOLUTION INTRAMUSCULAR; INTRAVENOUS at 23:31

## 2020-02-07 RX ADMIN — SODIUM CHLORIDE 1000 ML: 9 INJECTION, SOLUTION INTRAVENOUS at 18:53

## 2020-02-07 ASSESSMENT — PATIENT HEALTH QUESTIONNAIRE - PHQ9
2. FEELING DOWN, DEPRESSED, IRRITABLE, OR HOPELESS: NOT AT ALL
SUM OF ALL RESPONSES TO PHQ9 QUESTIONS 1 AND 2: 0
1. LITTLE INTEREST OR PLEASURE IN DOING THINGS: NOT AT ALL

## 2020-02-07 ASSESSMENT — LIFESTYLE VARIABLES
TOTAL SCORE: 0
ALCOHOL_USE: NO
ON A TYPICAL DAY WHEN YOU DRINK ALCOHOL HOW MANY DRINKS DO YOU HAVE: 0
HAVE PEOPLE ANNOYED YOU BY CRITICIZING YOUR DRINKING: NO
HAVE YOU EVER FELT YOU SHOULD CUT DOWN ON YOUR DRINKING: NO
EVER_SMOKED: NEVER
EVER HAD A DRINK FIRST THING IN THE MORNING TO STEADY YOUR NERVES TO GET RID OF A HANGOVER: NO
TOTAL SCORE: 0
HOW MANY TIMES IN THE PAST YEAR HAVE YOU HAD 5 OR MORE DRINKS IN A DAY: 0
EVER FELT BAD OR GUILTY ABOUT YOUR DRINKING: NO
DOES PATIENT WANT TO STOP DRINKING: NO
CONSUMPTION TOTAL: NEGATIVE
DO YOU DRINK ALCOHOL: NO
AVERAGE NUMBER OF DAYS PER WEEK YOU HAVE A DRINK CONTAINING ALCOHOL: 0
TOTAL SCORE: 0

## 2020-02-07 ASSESSMENT — COGNITIVE AND FUNCTIONAL STATUS - GENERAL
SUGGESTED CMS G CODE MODIFIER DAILY ACTIVITY: CM
CLIMB 3 TO 5 STEPS WITH RAILING: TOTAL
TOILETING: TOTAL
EATING MEALS: A LITTLE
TURNING FROM BACK TO SIDE WHILE IN FLAT BAD: A LOT
WALKING IN HOSPITAL ROOM: TOTAL
DAILY ACTIVITIY SCORE: 8
STANDING UP FROM CHAIR USING ARMS: A LOT
PERSONAL GROOMING: TOTAL
DRESSING REGULAR LOWER BODY CLOTHING: TOTAL
MOVING FROM LYING ON BACK TO SITTING ON SIDE OF FLAT BED: A LOT
SUGGESTED CMS G CODE MODIFIER MOBILITY: CL
MOVING TO AND FROM BED TO CHAIR: A LOT
DRESSING REGULAR UPPER BODY CLOTHING: TOTAL
MOBILITY SCORE: 10
HELP NEEDED FOR BATHING: TOTAL

## 2020-02-08 PROBLEM — E78.5 DYSLIPIDEMIA: Status: ACTIVE | Noted: 2020-02-08

## 2020-02-08 PROBLEM — R13.10 IMPAIRED SWALLOWING: Status: ACTIVE | Noted: 2020-02-08

## 2020-02-08 PROBLEM — F03.90 DEMENTIA (HCC): Status: ACTIVE | Noted: 2020-02-08

## 2020-02-08 LAB
ANION GAP SERPL CALC-SCNC: 4 MMOL/L (ref 0–11.9)
BUN SERPL-MCNC: 17 MG/DL (ref 8–22)
CALCIUM SERPL-MCNC: 8.6 MG/DL (ref 8.5–10.5)
CHLORIDE SERPL-SCNC: 109 MMOL/L (ref 96–112)
CO2 SERPL-SCNC: 26 MMOL/L (ref 20–33)
CREAT SERPL-MCNC: 0.5 MG/DL (ref 0.5–1.4)
EKG IMPRESSION: NORMAL
ERYTHROCYTE [DISTWIDTH] IN BLOOD BY AUTOMATED COUNT: 50.4 FL (ref 35.9–50)
GLUCOSE SERPL-MCNC: 102 MG/DL (ref 65–99)
HCT VFR BLD AUTO: 40.7 % (ref 37–47)
HGB BLD-MCNC: 12.9 G/DL (ref 12–16)
MCH RBC QN AUTO: 28.8 PG (ref 27–33)
MCHC RBC AUTO-ENTMCNC: 31.7 G/DL (ref 33.6–35)
MCV RBC AUTO: 90.8 FL (ref 81.4–97.8)
PLATELET # BLD AUTO: 146 K/UL (ref 164–446)
PMV BLD AUTO: 9.5 FL (ref 9–12.9)
POTASSIUM SERPL-SCNC: 4 MMOL/L (ref 3.6–5.5)
RBC # BLD AUTO: 4.48 M/UL (ref 4.2–5.4)
SODIUM SERPL-SCNC: 139 MMOL/L (ref 135–145)
WBC # BLD AUTO: 5.9 K/UL (ref 4.8–10.8)

## 2020-02-08 PROCEDURE — 700111 HCHG RX REV CODE 636 W/ 250 OVERRIDE (IP): Performed by: INTERNAL MEDICINE

## 2020-02-08 PROCEDURE — A9270 NON-COVERED ITEM OR SERVICE: HCPCS | Performed by: HOSPITALIST

## 2020-02-08 PROCEDURE — 92610 EVALUATE SWALLOWING FUNCTION: CPT

## 2020-02-08 PROCEDURE — 700105 HCHG RX REV CODE 258: Performed by: HOSPITALIST

## 2020-02-08 PROCEDURE — 770006 HCHG ROOM/CARE - MED/SURG/GYN SEMI*

## 2020-02-08 PROCEDURE — A9270 NON-COVERED ITEM OR SERVICE: HCPCS | Performed by: INTERNAL MEDICINE

## 2020-02-08 PROCEDURE — 85027 COMPLETE CBC AUTOMATED: CPT

## 2020-02-08 PROCEDURE — 93005 ELECTROCARDIOGRAM TRACING: CPT | Performed by: HOSPITALIST

## 2020-02-08 PROCEDURE — 93010 ELECTROCARDIOGRAM REPORT: CPT | Performed by: INTERNAL MEDICINE

## 2020-02-08 PROCEDURE — 700102 HCHG RX REV CODE 250 W/ 637 OVERRIDE(OP): Performed by: INTERNAL MEDICINE

## 2020-02-08 PROCEDURE — 99233 SBSQ HOSP IP/OBS HIGH 50: CPT | Performed by: HOSPITALIST

## 2020-02-08 PROCEDURE — 36415 COLL VENOUS BLD VENIPUNCTURE: CPT

## 2020-02-08 PROCEDURE — 80048 BASIC METABOLIC PNL TOTAL CA: CPT

## 2020-02-08 PROCEDURE — 700102 HCHG RX REV CODE 250 W/ 637 OVERRIDE(OP): Performed by: HOSPITALIST

## 2020-02-08 PROCEDURE — 700105 HCHG RX REV CODE 258: Performed by: INTERNAL MEDICINE

## 2020-02-08 RX ORDER — METOPROLOL TARTRATE 50 MG/1
50 TABLET, FILM COATED ORAL TWICE DAILY
Status: DISCONTINUED | OUTPATIENT
Start: 2020-02-08 | End: 2020-02-11

## 2020-02-08 RX ORDER — METOPROLOL TARTRATE 1 MG/ML
5 INJECTION, SOLUTION INTRAVENOUS
Status: DISCONTINUED | OUTPATIENT
Start: 2020-02-08 | End: 2020-02-11

## 2020-02-08 RX ORDER — SODIUM CHLORIDE, SODIUM LACTATE, POTASSIUM CHLORIDE, AND CALCIUM CHLORIDE .6; .31; .03; .02 G/100ML; G/100ML; G/100ML; G/100ML
500 INJECTION, SOLUTION INTRAVENOUS ONCE
Status: COMPLETED | OUTPATIENT
Start: 2020-02-08 | End: 2020-02-08

## 2020-02-08 RX ADMIN — ENOXAPARIN SODIUM 40 MG: 100 INJECTION SUBCUTANEOUS at 05:42

## 2020-02-08 RX ADMIN — SODIUM CHLORIDE, POTASSIUM CHLORIDE, SODIUM LACTATE AND CALCIUM CHLORIDE: 600; 310; 30; 20 INJECTION, SOLUTION INTRAVENOUS at 00:34

## 2020-02-08 RX ADMIN — METOPROLOL TARTRATE 50 MG: 50 TABLET, FILM COATED ORAL at 13:25

## 2020-02-08 RX ADMIN — ACETAMINOPHEN 650 MG: 325 TABLET, FILM COATED ORAL at 15:57

## 2020-02-08 RX ADMIN — SODIUM CHLORIDE, POTASSIUM CHLORIDE, SODIUM LACTATE AND CALCIUM CHLORIDE 500 ML: 600; 310; 30; 20 INJECTION, SOLUTION INTRAVENOUS at 13:25

## 2020-02-08 RX ADMIN — SODIUM CHLORIDE, POTASSIUM CHLORIDE, SODIUM LACTATE AND CALCIUM CHLORIDE: 600; 310; 30; 20 INJECTION, SOLUTION INTRAVENOUS at 16:32

## 2020-02-08 RX ADMIN — CEFTRIAXONE SODIUM 1 G: 1 INJECTION, POWDER, FOR SOLUTION INTRAMUSCULAR; INTRAVENOUS at 05:42

## 2020-02-08 NOTE — ED TRIAGE NOTES
.  Chief Complaint   Patient presents with   • ALOC     onset apx 1300   • Fever     pwer group home 100.1   biba from group home in [place], family notified of pt transfer.   Pt having decreased mental status and found to be febrile apx 1300. Pt moaning. Oriented to self. Pt denies pain. Mouth dry. Pt takes Nitrofurantoin daily

## 2020-02-08 NOTE — ASSESSMENT & PLAN NOTE
-Currently she is back in atrial fibrillation, rate controlled  -repeat ECG 2/8: stable  -Continue metoprolol  -She is not on anticoagulation other than aspirin, history of type B aortic aneurism with repair.

## 2020-02-08 NOTE — ED NOTES
Hand off report given to Marcela ERICKSON   Patient chart and current status reviewed with oncoming RN and all questions answered  This RN's primary care of patient terminated at this time

## 2020-02-08 NOTE — ED NOTES
Med rec completed per pt's MAR from group home  Allergies reviewed  Pt is on long Macrodantin long term for UTI prophylaxis

## 2020-02-08 NOTE — PROGRESS NOTES
Assumed care of patient around 2130. Received report from ED RN Marcela at 2115. Patient transferred from [facility] to bed using sheets. Patient resting comfortably in bed. Daughter Treva at bedside to help with admitting profile and answer questions regarding history. No signs of distress. Patient is very lethargic and does not wake up to assessment questions. Daughter says that patient is mostly non-verbal, but will talk when she chooses to. Bed is in low and locked position. Non-slip socks are in place. Call light is within reach. Bed alarm is on.

## 2020-02-08 NOTE — ASSESSMENT & PLAN NOTE
-Continue home Synthroid: 100 mcg daily  -Most recent TSH was approximately 1 year ago and was normal at 0.59  -Repeat with next labs if patient's family wishes to pursue aggressive treatment. Palliative consult pending.

## 2020-02-08 NOTE — THERAPY
"Speech Language Therapy Clinical Swallow Evaluation completed.    Functional Status: Patient was seen on this date for a clinical swallow evaluation. Patient AAO to self only. Daughter at bedside who is patient's POA and primary historian. Per daughter, pt has been on thickened liquids (eating regular foods) at group home since 2017. She reports pt is typically more alert compared to current presentation. Pt unable to follow directives for oral motor examination. Few vocalization produced which revealed mildly hoarse vocal quality. Pt intermittently closing eyes and restless. PO trials were administered and consisted of single ice chips, NTL, puree, and pudding. Onset of swallow trigger varied from 5-12 seconds. Laryngeal elevation reduced to palpation. With trials of NTL from cup, patient with gurgly vocal quality x1 and reduced bolus control as seen by bolus loss to right. Improved bolus control and no overt s/sx of aspiration with trials of NTL via tsp. Discussed with daughter current risk of aspiration given AMS and waxing and waning LEILA. Daughter stated if dysphagia were to worsen, she would likely not pursue feeding tubes.    Recommendations - Diet:   At this time, recommend a NTFL diet given STRICT 1:1 feeding. Pt MUST be awake and alert for PO. STOP PO with any difficulty and page SLP.                             Strategies: Strict 1:1 feeding , No Straws and Head of Bed at 90 Degrees                            Medication Administration: CRUSH meds in puree    Plan of Care: Will benefit from Speech Therapy 3 times per week    Post-Acute Therapy: Recommend inpatient transitional care services for continued speech therapy services.      See \"Rehab Therapy-Acute\" Patient Summary Report for complete documentation. Thank you for the consult.           "

## 2020-02-08 NOTE — H&P
Hospital Medicine History & Physical Note    Date of Service  2/7/2020    Primary Care Physician  Denver J Miller, M.D.    Consultants  None    Code Status  DNR    Chief Complaint  Altered mental status    History of Presenting Illness  88 y.o. female who presented 2/7/2020 with altered mental status noticed at her group home.  At this point in time, patient is nonverbal, information obtained from her daughter at bedside.  Daughter was called and told that she was having a fever and was more confused today.  When she arrived she did no confusion.  She was at her baseline mentation last night.  She does have a history of confusion with urinary tract infection.  Her daughter states when she walked into the room she did note a strong urine odor.  Patient does have a history of dementia.  I did discuss the case including labs and imaging with the ER physician.    Review of Systems  Review of Systems   Unable to perform ROS: Acuity of condition       Past Medical History   has a past medical history of Aortic aneurysm without rupture (HCC), Hypertension, Thyroid disorder, and UTI (urinary tract infection) (9/8/2017).    Surgical History   has a past surgical history that includes other cardiac surgery.     Family History  Hypertension, diabetes    Social History   reports that she has never smoked. She has never used smokeless tobacco. She reports that she does not drink alcohol or use drugs.    Allergies  Allergies   Allergen Reactions   • Penicillins Unspecified     Per MAR. Unknown reaction       Medications  Prior to Admission Medications   Prescriptions Last Dose Informant Patient Reported? Taking?   CRANBERRY PO 2/6/2020 at 2000 MAR from Other Facility Yes Yes   Sig: Take 4,200 mg by mouth 4 times a day.   Cholecalciferol (VITAMIN D) 2000 units Cap 2/6/2020 at 0800 MAR from Other Facility Yes No   Sig: Take 2,000 Units by mouth every morning.   aspirin 81 MG tablet 2/6/2020 at 0800 MAR from Other Facility Yes No    Sig: Take 81 mg by mouth every morning.   atorvastatin (LIPITOR) 20 MG Tab 2/6/2020 at 0800 MAR from Other Facility Yes No   Sig: Take 20 mg by mouth every morning.   levothyroxine (SYNTHROID) 100 MCG Tab 2/6/2020 at 0800 MAR from Other Facility Yes No   Sig: Take 100 mcg by mouth Every morning on an empty stomach.   metoprolol SR (TOPROL XL) 100 MG TABLET SR 24 HR 2/6/2020 at 2000 MAR from Other Facility Yes Yes   Sig: Take 100 mg by mouth every bedtime.   nitrofurantoin (MACRODANTIN) 50 MG Cap 2/6/2020 at 0800 MAR from Other Facility Yes Yes   Sig: Take 50 mg by mouth every morning. Indications: UTI prophylaxis   polyethylene glycol/lytes (MIRALAX) Pack 2/5/2020 at 0800 MAR from Other Facility Yes No   Sig: Take 17 g by mouth every Monday, Wednesday, and Friday.   therapeutic multivitamin-minerals (THERAGRAN-M) Tab 2/6/2020 at 0800 MAR from Other Facility Yes No   Sig: Take 1 Tab by mouth every morning.   valsartan-hydrochlorothiazide (DIOVAN-HCT) 160-12.5 MG per tablet 2/6/2020 at 0800 MAR from Other Facility Yes No   Sig: Take 0.5 Tabs by mouth every morning.      Facility-Administered Medications: None       Physical Exam  Temp:  [36.7 °C (98.1 °F)] 36.7 °C (98.1 °F)  Pulse:  [68-86] 68  Resp:  [16-27] 27  BP: (114-120)/(58-65) 120/58  SpO2:  [97 %-99 %] 99 %    Physical Exam  Vitals signs and nursing note reviewed.   Constitutional:       General: She is not in acute distress.     Appearance: She is well-developed. She is cachectic. She is ill-appearing. She is not diaphoretic.   HENT:      Head: Normocephalic and atraumatic.      Right Ear: External ear normal.      Left Ear: External ear normal.      Nose: Nose normal. No congestion or rhinorrhea.      Mouth/Throat:      Mouth: Mucous membranes are dry.      Pharynx: No oropharyngeal exudate.   Eyes:      General:         Right eye: No discharge.         Left eye: No discharge.   Neck:      Musculoskeletal: Neck supple.      Trachea: No tracheal deviation.    Cardiovascular:      Rate and Rhythm: Normal rate. Rhythm irregularly irregular.      Heart sounds: No murmur. No friction rub. No gallop.    Pulmonary:      Effort: Pulmonary effort is normal. No respiratory distress.      Breath sounds: Normal breath sounds. No stridor. No wheezing or rales.   Abdominal:      General: Bowel sounds are normal. There is no distension.      Palpations: Abdomen is soft.   Musculoskeletal:      Right lower leg: No edema.      Left lower leg: No edema.   Lymphadenopathy:      Cervical: No cervical adenopathy.   Skin:     General: Skin is warm and dry.      Coloration: Skin is not jaundiced.      Findings: No erythema or rash.   Neurological:      Comments: Somnolent, nonverbal  Facial droop, chronic   Psychiatric:         Speech: She is noncommunicative.         Laboratory:  Recent Labs     02/07/20  1830   WBC 6.7   RBC 4.54   HEMOGLOBIN 13.2   HEMATOCRIT 41.0   MCV 90.3   MCH 29.1   MCHC 32.2*   RDW 50.4*   PLATELETCT 156*   MPV 9.9     Recent Labs     02/07/20  1830   SODIUM 139   POTASSIUM 4.0   CHLORIDE 107   CO2 26   GLUCOSE 106*   BUN 16   CREATININE 0.56   CALCIUM 9.0     Recent Labs     02/07/20  1830   ALTSGPT 13   ASTSGOT 14   ALKPHOSPHAT 86   TBILIRUBIN 3.0*   GLUCOSE 106*         No results for input(s): NTPROBNP in the last 72 hours.      Recent Labs     02/07/20  1830   TROPONINT 13       Urinalysis:    Recent Labs     02/07/20  1842   SPECGRAVITY 1.016   GLUCOSEUR Negative   KETONES Negative   NITRITE Positive*   LEUKESTERAS Large*   WBCURINE Packed*   RBCURINE >150*   BACTERIA Many*   EPITHELCELL Negative        Imaging:  CT-HEAD W/O   Final Result      1.  No acute intracranial process.      2.  Atrophy and small vessel ischemic change.      3.  Interval increase in size of extra-axial mass, consistent with meningioma, in the interhemispheric fissure in the frontal region currently measuring 2.8 cm in maximum diameter compared with 2.0 cm previously.       DX-CHEST-PORTABLE (1 VIEW)   Final Result      1.  Marked cardiomegaly with interstitial prominence possibly representing a component of interstitial edema.      2.  Again seen thoracic aortic aneurysm.            Assessment/Plan:  I anticipate this patient will require at least two midnights for appropriate medical management, necessitating inpatient admission.    Complicated UTI (urinary tract infection)- (present on admission)  Assessment & Plan  -Causing acute metabolic encephalopathy  -Start IV Rocephin  -Await culture results  -Not causing sepsis    Dehydration- (present on admission)  Assessment & Plan  -Start IV fluids  -Repeat BMP in the morning    Dementia (HCC)- (present on admission)  Assessment & Plan  -Chronic with acute worsening due to her urinary tract infection  -I did personally review her CT head, noted no acute intracranial abnormality    Dyslipidemia- (present on admission)  Assessment & Plan  -Continue home statin    Hypothyroidism- (present on admission)  Assessment & Plan  -Continue home Synthroid at 100 mcg daily  -Most recent TSH was approximately 1 year ago and was normal at 0.59    Paroxysmal A-fib (HCC)- (present on admission)  Assessment & Plan  -Currently she is back in atrial fibrillation, rate controlled  -Continue metoprolol  -She is not on anticoagulation and at this point in time I do not feel full dose anticoagulation is warranted    Hypertension- (present on admission)  Assessment & Plan  -Continue home valsartan, metoprolol, hydrochlorothiazide  -Start PRN enalapril  -Adjust as needed      VTE prophylaxis: Lovenox

## 2020-02-08 NOTE — PROGRESS NOTES
· 2 RN skin check completed with Erin RN   · Devices in place n/a.  · Skin assessed under devices n/a.  · Confirmed pressure ulcers found on n/a.  · New potential pressure ulcers noted on n/a. Wound consult placed? n/a. Photo uploaded? n/a.   · The following interventions are in place q2h turns, waffle mattress, extra pillows for turning/repositioning, preventative mepilex on bilateral heels, and barrier cream.    Patient LUE has bruising and scabs. Sacrum is red, but blanching. Bilateral heels are red and blanching. Patient has a raised bump/nodule on left side of neck that daughter is aware of.

## 2020-02-08 NOTE — PROGRESS NOTES
[facility] Medicine Daily Progress Note    Date of Service  2/8/2020    Chief Complaint  Altered mental status    Hospital Course    88 y.o. female with a past medical history of dementia and urinary tract infection presented 2/7/2020 with altered mental status noticed at her group home.  Patient was nonverbal, information obtained from her daughter at bedside in emergency department.  Daughter was called and told that the patient was having a fever and was more confused. Daughter noticed a strong urine odor.   Head CT showed no acute abnormalities.  Chest x-ray showed cardiomegaly and likely interstitial edema.  Urinalysis positive for urinary tract infection.  IV Rocephin and fluid resuscitation overnight.      Interval Problem Update  This morning the patient is upright in bed, easily awakened.  Assessment is limited by patient's difficulty with speech.  She does respond appropriately.  She denies pain and any other problems at this time.     Consultants/Specialty  None    Code Status  DNAR/DNI    Disposition  TBD    Review of Systems  Review of Systems   Unable to perform ROS: Patient nonverbal        Physical Exam  Temp:  [36.5 °C (97.7 °F)-37.7 °C (99.9 °F)] 37.7 °C (99.9 °F)  Pulse:  [] 102  Resp:  [16-33] 33  BP: (112-129)/(51-68) 123/68  SpO2:  [95 %-100 %] 95 %    Physical Exam  Vitals signs and nursing note reviewed.   Constitutional:       General: She is not in acute distress.     Appearance: She is well-developed. She is cachectic. She is ill-appearing. She is not diaphoretic.   HENT:      Head: Normocephalic and atraumatic.      Right Ear: External ear normal.      Left Ear: External ear normal.      Nose: Nose normal. No congestion or rhinorrhea.      Mouth/Throat:      Mouth: Mucous membranes are dry.      Pharynx: No oropharyngeal exudate.   Eyes:      General:         Right eye: No discharge.         Left eye: No discharge.   Neck:      Musculoskeletal: Neck supple.      Trachea: No tracheal  deviation.   Cardiovascular:      Rate and Rhythm: Rhythm irregularly irregular.      Pulses: Normal pulses.      Heart sounds: No murmur. No friction rub. No gallop.    Pulmonary:      Effort: Pulmonary effort is normal. No respiratory distress.      Breath sounds: No stridor. Examination of the right-middle field reveals wheezing. Examination of the right-lower field reveals decreased breath sounds, wheezing and rhonchi. Examination of the left-lower field reveals decreased breath sounds, wheezing and rhonchi. Decreased breath sounds, wheezing and rhonchi present. No rales.   Chest:      Chest wall: No tenderness.   Abdominal:      General: Bowel sounds are normal. There is no distension.      Palpations: Abdomen is soft. There is no mass.      Tenderness: There is no tenderness. There is no right CVA tenderness, left CVA tenderness or guarding.   Musculoskeletal:      Right lower leg: No edema.      Left lower leg: No edema.   Lymphadenopathy:      Cervical: No cervical adenopathy.   Skin:     General: Skin is warm and dry.      Coloration: Skin is not jaundiced.      Findings: No erythema or rash.   Neurological:      Mental Status: She is easily aroused. Mental status is at baseline.      GCS: GCS eye subscore is 3. GCS verbal subscore is 2. GCS motor subscore is 5.      Motor: Weakness and atrophy present.      Comments: Somnolent, minimally verbal  Facial droop, chronic   Psychiatric:         Speech: She is noncommunicative.         Behavior: Behavior is cooperative.         Fluids    Intake/Output Summary (Last 24 hours) at 2/8/2020 1545  Last data filed at 2/7/2020 2043  Gross per 24 hour   Intake 1000 ml   Output --   Net 1000 ml       Laboratory  Recent Labs     02/07/20 1830 02/08/20  0316   WBC 6.7 5.9   RBC 4.54 4.48   HEMOGLOBIN 13.2 12.9   HEMATOCRIT 41.0 40.7   MCV 90.3 90.8   MCH 29.1 28.8   MCHC 32.2* 31.7*   RDW 50.4* 50.4*   PLATELETCT 156* 146*   MPV 9.9 9.5     Recent Labs     02/07/20 1830  02/08/20  0316   SODIUM 139 139   POTASSIUM 4.0 4.0   CHLORIDE 107 109   CO2 26 26   GLUCOSE 106* 102*   BUN 16 17   CREATININE 0.56 0.50   CALCIUM 9.0 8.6                   Imaging  CT-HEAD W/O   Final Result      1.  No acute intracranial process.      2.  Atrophy and small vessel ischemic change.      3.  Interval increase in size of extra-axial mass, consistent with meningioma, in the interhemispheric fissure in the frontal region currently measuring 2.8 cm in maximum diameter compared with 2.0 cm previously.      DX-CHEST-PORTABLE (1 VIEW)   Final Result      1.  Marked cardiomegaly with interstitial prominence possibly representing a component of interstitial edema.      2.  Again seen thoracic aortic aneurysm.           Assessment/Plan  Complicated UTI (urinary tract infection)- (present on admission)  Assessment & Plan  -Causing acute metabolic encephalopathy  -Continue IV Rocephin  -Urine culture growing Proteus species  -Not causing sepsis    Impaired swallowing  Assessment & Plan  SLP evaluation: Full liquid diet, nectar thick liquids  -Adventitious lung sounds  -Aspiration precautions    Dementia (HCC)- (present on admission)  Assessment & Plan  -Chronic with acute worsening due to her urinary tract infection  -CT head: no acute intracranial abnormality    Dehydration- (present on admission)  Assessment & Plan  -continue IV fluids  -Dry mucous membranes  -Resolving  -Encourage oral intake    Hypertension- (present on admission)  Assessment & Plan  -Continue home valsartan, metoprolol, hydrochlorothiazide  -Well-managed on current regimen  -PRN enalapril  -Adjust as needed    Dyslipidemia- (present on admission)  Assessment & Plan  Continue home statin    Hypothyroidism- (present on admission)  Assessment & Plan  -Continue home Synthroid at 100 mcg daily  -Most recent TSH was approximately 1 year ago and was normal at 0.59  Repeat with next labs if patient's family wishes to pursue aggressive treatment.  Palliative consult pending.    Paroxysmal A-fib (HCC)- (present on admission)  Assessment & Plan  -Currently she is back in atrial fibrillation, rate controlled  -repeat ECG today: stable  -Continue metoprolol  -She is not on anticoagulation other than aspirin, history of type B aortic aneurism with repair.         VTE prophylaxis: Lovenox

## 2020-02-08 NOTE — PROGRESS NOTES
"Assume care of pt at 0700. Report received from NOC RN. Pt is nonverbal except for saying \"no\" when asked if in any pain. Pt is alert but very fatigued. unable to orientate. Pt denies being in any pain this AM. Pt is resting in bed. Bed in lowest and locked position, call light in reach, hourly rounding in place. Labs reviewed. Pts bed alarm armed. Communication board updated.     Pts daughter at the bedside, brought her dentures for the speech/swallow eval. Pts daughter states that at the home she is always in an upright position and able to clear secretions. Pts head of bed raised to 30 degrees. This AM pt was desating at 89% while vigorously coughing, suction of the mouth performed and large amount of mucus removed. O2 sat increased following suctioning at 97%. Pt repositioned, Q2 turns in place. Will continue to monitor and reorientate.   "

## 2020-02-08 NOTE — CARE PLAN
Problem: Safety  Goal: Will remain free from injury  Outcome: PROGRESSING AS EXPECTED  Note:   Patient educated on the importance of utilizing call light for assistance. Bed is in low position and locked. Patient is wearing non-slip socks. Bed alarm is on. Call light within reach and patient calls appropriately.      Problem: Skin Integrity  Goal: Risk for impaired skin integrity will decrease  Outcome: PROGRESSING AS EXPECTED  Note:   2 RN skin check done. Preventative mepilex, Q2h turns, and waffle mattress in place.

## 2020-02-08 NOTE — PROGRESS NOTES
Per daughter, patient had aspirated before and required specific food texture and fluid consistency. Patient has been lethargic since being admitted to floor, and has not respond to any commands. Patient has been placed on NPO for now and swallow eval has been ordered. This RN spoke to daughter Treva regarding this and she is agreeable. Daughter will bring patients dentures tomorrow from group home.

## 2020-02-08 NOTE — ED NOTES
Assumed care, report received from Tasha ERICKSON, POC discussed.   Introduced self as RN, call light within reach, no s/s of distress noted.   Pt sleeping in bed, daughter at bedside updated on POC

## 2020-02-08 NOTE — CARE PLAN
Problem: Skin Integrity  Goal: Risk for impaired skin integrity will decrease  Outcome: PROGRESSING AS EXPECTED  Patient is at HIGH risk for skin breakdown - waffle cushion in use, Q2 turns, pillows in use for reposition, moisture management, and preventative heel mepilex in use.      Problem: Communication  Goal: The ability to communicate needs accurately and effectively will improve  Outcome: PROGRESSING SLOWER THAN EXPECTED   Pt fatigued, resting with eyes closed, non-verbal at this time. Per daughter, patient is selective who she speaks to and when she likes to speak.

## 2020-02-08 NOTE — ASSESSMENT & PLAN NOTE
-Continue home valsartan, metoprolol, hydrochlorothiazide  -Well-managed on current regimen  -PRN enalapril, has not needed

## 2020-02-09 ENCOUNTER — APPOINTMENT (OUTPATIENT)
Dept: RADIOLOGY | Facility: MEDICAL CENTER | Age: 85
DRG: 689 | End: 2020-02-09
Attending: HOSPITALIST
Payer: COMMERCIAL

## 2020-02-09 PROBLEM — R10.12 ABDOMINAL PAIN, ACUTE, LEFT UPPER QUADRANT: Status: ACTIVE | Noted: 2020-02-09

## 2020-02-09 LAB
ANION GAP SERPL CALC-SCNC: 6 MMOL/L (ref 0–11.9)
BACTERIA UR CULT: ABNORMAL
BACTERIA UR CULT: ABNORMAL
BASOPHILS # BLD AUTO: 0.5 % (ref 0–1.8)
BASOPHILS # BLD: 0.02 K/UL (ref 0–0.12)
BUN SERPL-MCNC: 16 MG/DL (ref 8–22)
CALCIUM SERPL-MCNC: 8.4 MG/DL (ref 8.5–10.5)
CHLORIDE SERPL-SCNC: 112 MMOL/L (ref 96–112)
CO2 SERPL-SCNC: 26 MMOL/L (ref 20–33)
CREAT SERPL-MCNC: 0.46 MG/DL (ref 0.5–1.4)
EOSINOPHIL # BLD AUTO: 0 K/UL (ref 0–0.51)
EOSINOPHIL NFR BLD: 0 % (ref 0–6.9)
ERYTHROCYTE [DISTWIDTH] IN BLOOD BY AUTOMATED COUNT: 53.1 FL (ref 35.9–50)
GLUCOSE SERPL-MCNC: 82 MG/DL (ref 65–99)
HCT VFR BLD AUTO: 39 % (ref 37–47)
HGB BLD-MCNC: 11.8 G/DL (ref 12–16)
IMM GRANULOCYTES # BLD AUTO: 0.01 K/UL (ref 0–0.11)
IMM GRANULOCYTES NFR BLD AUTO: 0.2 % (ref 0–0.9)
LYMPHOCYTES # BLD AUTO: 1.04 K/UL (ref 1–4.8)
LYMPHOCYTES NFR BLD: 23.7 % (ref 22–41)
MCH RBC QN AUTO: 28.4 PG (ref 27–33)
MCHC RBC AUTO-ENTMCNC: 30.3 G/DL (ref 33.6–35)
MCV RBC AUTO: 94 FL (ref 81.4–97.8)
MONOCYTES # BLD AUTO: 0.49 K/UL (ref 0–0.85)
MONOCYTES NFR BLD AUTO: 11.2 % (ref 0–13.4)
NEUTROPHILS # BLD AUTO: 2.82 K/UL (ref 2–7.15)
NEUTROPHILS NFR BLD: 64.4 % (ref 44–72)
NRBC # BLD AUTO: 0 K/UL
NRBC BLD-RTO: 0 /100 WBC
PLATELET # BLD AUTO: 126 K/UL (ref 164–446)
PMV BLD AUTO: 10.1 FL (ref 9–12.9)
POTASSIUM SERPL-SCNC: 3.7 MMOL/L (ref 3.6–5.5)
RBC # BLD AUTO: 4.15 M/UL (ref 4.2–5.4)
SIGNIFICANT IND 70042: ABNORMAL
SITE SITE: ABNORMAL
SODIUM SERPL-SCNC: 144 MMOL/L (ref 135–145)
SOURCE SOURCE: ABNORMAL
WBC # BLD AUTO: 4.4 K/UL (ref 4.8–10.8)

## 2020-02-09 PROCEDURE — A9270 NON-COVERED ITEM OR SERVICE: HCPCS | Performed by: INTERNAL MEDICINE

## 2020-02-09 PROCEDURE — 700111 HCHG RX REV CODE 636 W/ 250 OVERRIDE (IP): Performed by: INTERNAL MEDICINE

## 2020-02-09 PROCEDURE — 36415 COLL VENOUS BLD VENIPUNCTURE: CPT

## 2020-02-09 PROCEDURE — 74018 RADEX ABDOMEN 1 VIEW: CPT

## 2020-02-09 PROCEDURE — A9270 NON-COVERED ITEM OR SERVICE: HCPCS | Performed by: NURSE PRACTITIONER

## 2020-02-09 PROCEDURE — 770006 HCHG ROOM/CARE - MED/SURG/GYN SEMI*

## 2020-02-09 PROCEDURE — 700102 HCHG RX REV CODE 250 W/ 637 OVERRIDE(OP): Performed by: HOSPITALIST

## 2020-02-09 PROCEDURE — 99233 SBSQ HOSP IP/OBS HIGH 50: CPT | Performed by: HOSPITALIST

## 2020-02-09 PROCEDURE — 80048 BASIC METABOLIC PNL TOTAL CA: CPT

## 2020-02-09 PROCEDURE — 700105 HCHG RX REV CODE 258: Performed by: INTERNAL MEDICINE

## 2020-02-09 PROCEDURE — 700102 HCHG RX REV CODE 250 W/ 637 OVERRIDE(OP): Performed by: INTERNAL MEDICINE

## 2020-02-09 PROCEDURE — A9270 NON-COVERED ITEM OR SERVICE: HCPCS | Performed by: HOSPITALIST

## 2020-02-09 PROCEDURE — 85025 COMPLETE CBC W/AUTO DIFF WBC: CPT

## 2020-02-09 PROCEDURE — 71045 X-RAY EXAM CHEST 1 VIEW: CPT

## 2020-02-09 PROCEDURE — 700102 HCHG RX REV CODE 250 W/ 637 OVERRIDE(OP): Performed by: NURSE PRACTITIONER

## 2020-02-09 RX ORDER — POLYETHYLENE GLYCOL 3350 17 G/17G
1 POWDER, FOR SOLUTION ORAL
Status: DISCONTINUED | OUTPATIENT
Start: 2020-02-09 | End: 2020-02-11

## 2020-02-09 RX ORDER — AMOXICILLIN 250 MG
2 CAPSULE ORAL 2 TIMES DAILY
Status: DISCONTINUED | OUTPATIENT
Start: 2020-02-09 | End: 2020-02-11

## 2020-02-09 RX ORDER — BISACODYL 10 MG
10 SUPPOSITORY, RECTAL RECTAL ONCE
Status: COMPLETED | OUTPATIENT
Start: 2020-02-09 | End: 2020-02-09

## 2020-02-09 RX ADMIN — BISACODYL 10 MG: 10 SUPPOSITORY RECTAL at 15:39

## 2020-02-09 RX ADMIN — SENNOSIDES AND DOCUSATE SODIUM 2 TABLET: 8.6; 5 TABLET ORAL at 17:08

## 2020-02-09 RX ADMIN — CEFTRIAXONE SODIUM 1 G: 1 INJECTION, POWDER, FOR SOLUTION INTRAMUSCULAR; INTRAVENOUS at 05:08

## 2020-02-09 RX ADMIN — SODIUM CHLORIDE, POTASSIUM CHLORIDE, SODIUM LACTATE AND CALCIUM CHLORIDE: 600; 310; 30; 20 INJECTION, SOLUTION INTRAVENOUS at 03:00

## 2020-02-09 RX ADMIN — ACETAMINOPHEN 650 MG: 325 TABLET, FILM COATED ORAL at 15:22

## 2020-02-09 RX ADMIN — ENOXAPARIN SODIUM 40 MG: 100 INJECTION SUBCUTANEOUS at 05:08

## 2020-02-09 RX ADMIN — METOPROLOL TARTRATE 50 MG: 50 TABLET, FILM COATED ORAL at 17:09

## 2020-02-09 ASSESSMENT — ENCOUNTER SYMPTOMS
FLANK PAIN: 1
ABDOMINAL PAIN: 1

## 2020-02-09 NOTE — CARE PLAN
Problem: Venous Thromboembolism (VTW)/Deep Vein Thrombosis (DVT) Prevention:  Goal: Patient will participate in Venous Thrombosis (VTE)/Deep Vein Thrombosis (DVT)Prevention Measures  Intervention: Assess and monitor for anticoagulation complications  Note:   Lovenox subcutaneous ordered for VTE prophylaxis.      Problem: Respiratory:  Goal: Respiratory status will improve  Intervention: Assess and monitor pulmonary status  Note:    in use. Pt is on 4L NC, satts at 94%.

## 2020-02-09 NOTE — THERAPY
Attempted PT evaluation. RN requesting hold as pt was groaning with pain all morning and is now sleeping. RN reports pt also not following commands at this time. Requesting hold until tomorrow. Will re-attempt as able/appropriate.

## 2020-02-09 NOTE — PROGRESS NOTES
Rec'd report from day shift RN. Assumed pt care. Assessment completed. Pt is lethargic, non verbal, is not answering yes or no questions.  in use. Pt is incontinent of urine, cleaned up and linens changed. Suctioned pt as gargly sound heard in back throat. Pt seems unable to manage secretions. Very thick mucus and soft food particles in the back of throat when suctioning. Pt does have a strong congested cough. Expiratory wheezes heard in all lung fields. Will make pt NPO now. Q2 hour turns enforced. Bed in lowest position, bed locked, bed alarm on for safety, treaded socks in place, RN and CNA numbers provided, call light within reach.

## 2020-02-09 NOTE — PROGRESS NOTES
Assumed care at 0700, report received from NOC RN.  Pt alert, however unable to obtain orientation due to not answering questions and moaning throughout assessment. Bed locked, 3 rails up, bed in lowest position. Call light in place, belongings at bedside, no needs at this time and hourly rounding in place.    0715 No family at bedside, pt unable to follow commands at this time. 1:1 feed at this time.   1030 Daughter at bedside, pt able to follow commands better. Took meds okay with 1:1 supervision crushed in pudding.   POC discussed w daughter, await palliative consult, PT/OT evals, IV abx.

## 2020-02-10 PROCEDURE — 700102 HCHG RX REV CODE 250 W/ 637 OVERRIDE(OP): Performed by: NURSE PRACTITIONER

## 2020-02-10 PROCEDURE — A9270 NON-COVERED ITEM OR SERVICE: HCPCS | Performed by: HOSPITALIST

## 2020-02-10 PROCEDURE — 99497 ADVNCD CARE PLAN 30 MIN: CPT | Performed by: HOSPITALIST

## 2020-02-10 PROCEDURE — 99233 SBSQ HOSP IP/OBS HIGH 50: CPT | Mod: 25 | Performed by: HOSPITALIST

## 2020-02-10 PROCEDURE — 700105 HCHG RX REV CODE 258: Performed by: INTERNAL MEDICINE

## 2020-02-10 PROCEDURE — A9270 NON-COVERED ITEM OR SERVICE: HCPCS | Performed by: NURSE PRACTITIONER

## 2020-02-10 PROCEDURE — 700102 HCHG RX REV CODE 250 W/ 637 OVERRIDE(OP): Performed by: HOSPITALIST

## 2020-02-10 PROCEDURE — 700102 HCHG RX REV CODE 250 W/ 637 OVERRIDE(OP): Performed by: INTERNAL MEDICINE

## 2020-02-10 PROCEDURE — 700111 HCHG RX REV CODE 636 W/ 250 OVERRIDE (IP): Performed by: INTERNAL MEDICINE

## 2020-02-10 PROCEDURE — A9270 NON-COVERED ITEM OR SERVICE: HCPCS | Performed by: INTERNAL MEDICINE

## 2020-02-10 PROCEDURE — 97166 OT EVAL MOD COMPLEX 45 MIN: CPT

## 2020-02-10 PROCEDURE — 770006 HCHG ROOM/CARE - MED/SURG/GYN SEMI*

## 2020-02-10 PROCEDURE — 97162 PT EVAL MOD COMPLEX 30 MIN: CPT

## 2020-02-10 RX ORDER — LIDOCAINE 50 MG/G
2 PATCH TOPICAL EVERY 24 HOURS
Status: DISCONTINUED | OUTPATIENT
Start: 2020-02-10 | End: 2020-02-10

## 2020-02-10 RX ORDER — LIDOCAINE 50 MG/G
2 PATCH TOPICAL
Status: DISCONTINUED | OUTPATIENT
Start: 2020-02-10 | End: 2020-02-11

## 2020-02-10 RX ADMIN — ATORVASTATIN CALCIUM 20 MG: 20 TABLET, FILM COATED ORAL at 09:54

## 2020-02-10 RX ADMIN — ACETAMINOPHEN 650 MG: 325 TABLET, FILM COATED ORAL at 17:48

## 2020-02-10 RX ADMIN — VALSARTAN 80 MG: 80 TABLET, FILM COATED ORAL at 09:54

## 2020-02-10 RX ADMIN — HYDROCHLOROTHIAZIDE 6.25 MG: 25 TABLET ORAL at 09:52

## 2020-02-10 RX ADMIN — SENNOSIDES AND DOCUSATE SODIUM 2 TABLET: 8.6; 5 TABLET ORAL at 17:44

## 2020-02-10 RX ADMIN — ASPIRIN 81 MG: 81 TABLET, COATED ORAL at 09:53

## 2020-02-10 RX ADMIN — METOPROLOL TARTRATE 50 MG: 50 TABLET, FILM COATED ORAL at 17:44

## 2020-02-10 RX ADMIN — CEFTRIAXONE SODIUM 1 G: 1 INJECTION, POWDER, FOR SOLUTION INTRAMUSCULAR; INTRAVENOUS at 07:21

## 2020-02-10 RX ADMIN — ENOXAPARIN SODIUM 40 MG: 100 INJECTION SUBCUTANEOUS at 07:20

## 2020-02-10 RX ADMIN — METOPROLOL TARTRATE 50 MG: 50 TABLET, FILM COATED ORAL at 09:53

## 2020-02-10 RX ADMIN — LEVOTHYROXINE SODIUM 100 MCG: 100 TABLET ORAL at 09:53

## 2020-02-10 ASSESSMENT — COGNITIVE AND FUNCTIONAL STATUS - GENERAL
TURNING FROM BACK TO SIDE WHILE IN FLAT BAD: UNABLE
SUGGESTED CMS G CODE MODIFIER MOBILITY: CM
HELP NEEDED FOR BATHING: TOTAL
MOBILITY SCORE: 7
DAILY ACTIVITIY SCORE: 10
STANDING UP FROM CHAIR USING ARMS: A LOT
DRESSING REGULAR LOWER BODY CLOTHING: TOTAL
PERSONAL GROOMING: A LOT
WALKING IN HOSPITAL ROOM: TOTAL
MOVING FROM LYING ON BACK TO SITTING ON SIDE OF FLAT BED: UNABLE
MOVING TO AND FROM BED TO CHAIR: UNABLE
TOILETING: A LOT
DRESSING REGULAR UPPER BODY CLOTHING: A LOT
EATING MEALS: A LOT
CLIMB 3 TO 5 STEPS WITH RAILING: TOTAL
SUGGESTED CMS G CODE MODIFIER DAILY ACTIVITY: CL

## 2020-02-10 ASSESSMENT — ACTIVITIES OF DAILY LIVING (ADL): TOILETING: REQUIRES ASSIST

## 2020-02-10 ASSESSMENT — ENCOUNTER SYMPTOMS
NAUSEA: 0
FLANK PAIN: 0
ABDOMINAL PAIN: 0

## 2020-02-10 ASSESSMENT — GAIT ASSESSMENTS: GAIT LEVEL OF ASSIST: UNABLE TO PARTICIPATE

## 2020-02-10 NOTE — PROGRESS NOTES
[facility] Medicine Daily Progress Note    Date of Service  2/10/2020    Chief Complaint  Altered mental status    Hospital Course    88 y.o. female with a past medical history of dementia and urinary tract infection presented 2/7/2020 with altered mental status noticed at her group home.  Patient was nonverbal, information obtained from her daughter at bedside in emergency department.  Daughter was called and told that the patient was having a fever and was more confused. Daughter noticed a strong urine odor.   Head CT showed no acute abnormalities.  Chest x-ray showed cardiomegaly and likely interstitial edema.  Urinalysis positive for urinary tract infection.  IV Rocephin and fluid resuscitation overnight.      Interval Problem Update  This morning the patient is upright in bed, awake and completely disoriented.  Assessment is limited by patient's difficulty with speech.  She does respond appropriately.  She has 10/10 left upper quadrant abdominal pain and bilateral flank tenderness on palpation.  There have been no bowel movements reported during this hospitalization.  Nursing reports swallowing problems overnight.  Inspiratory wheezes in bilateral upper lung lobes, diminished in bases.  Mucous membranes are dry.    Abdominal x-ray shows bilateral staghorn renal stones, otherwise unremarkable.  No leukocytosis.  Vital signs within normal limits.  Palliative consult pending.  Daughter at bedside is interested in discussing hospice.  Continue IV fluids and antibiotics.  Patient is very likely aspirating.  Repeat x-ray shows edema, no distinct pneumonia at this time.     Consultants/Specialty  None    Code Status  DNAR/DNI    Disposition  TBD, hospice consult pending.    Review of Systems  Review of Systems   Unable to perform ROS: Dementia   Cardiovascular: Negative for chest pain.   Gastrointestinal: Negative for abdominal pain and nausea.   Genitourinary: Negative for flank pain.      Physical Exam  Temp:  [36.5  °C (97.7 °F)-36.7 °C (98 °F)] 36.5 °C (97.7 °F)  Pulse:  [67-99] 99  Resp:  [17-25] 19  BP: (107-129)/(54-68) 114/55  SpO2:  [95 %-100 %] 95 %    Physical Exam  Vitals signs and nursing note reviewed.   Constitutional:       General: She is not in acute distress.     Appearance: She is well-developed. She is cachectic. She is ill-appearing. She is not diaphoretic.   HENT:      Head: Normocephalic and atraumatic.      Right Ear: External ear normal.      Left Ear: External ear normal.      Nose: Nose normal. No congestion or rhinorrhea.      Mouth/Throat:      Mouth: Mucous membranes are dry.      Pharynx: No oropharyngeal exudate.   Eyes:      General:         Right eye: No discharge.         Left eye: No discharge.   Neck:      Musculoskeletal: Neck supple.      Trachea: No tracheal deviation.   Cardiovascular:      Rate and Rhythm: Rhythm irregularly irregular.      Pulses: Normal pulses.      Heart sounds: No murmur. No friction rub. No gallop.    Pulmonary:      Effort: Pulmonary effort is normal. No respiratory distress.      Breath sounds: No stridor. Examination of the right-middle field reveals wheezing. Examination of the right-lower field reveals decreased breath sounds, wheezing and rhonchi. Examination of the left-lower field reveals decreased breath sounds, wheezing and rhonchi. Decreased breath sounds, wheezing and rhonchi present. No rales.   Chest:      Chest wall: No tenderness.   Abdominal:      General: Bowel sounds are decreased. There is no distension.      Palpations: Abdomen is soft. There is no mass.      Tenderness: There is no tenderness. There is no guarding.   Musculoskeletal:      Right lower leg: No edema.      Left lower leg: No edema.   Lymphadenopathy:      Cervical: No cervical adenopathy.   Skin:     General: Skin is warm and dry.      Coloration: Skin is not jaundiced.      Findings: No erythema or rash.   Neurological:      Mental Status: She is easily aroused. Mental status is  at baseline.      GCS: GCS eye subscore is 3. GCS verbal subscore is 2. GCS motor subscore is 5.      Motor: Weakness and atrophy present.      Comments: minimally verbal. Facial droop, chronic   Psychiatric:         Speech: Speech is slurred.         Behavior: Behavior is cooperative.       Fluids    Intake/Output Summary (Last 24 hours) at 2/10/2020 1524  Last data filed at 2/10/2020 1154  Gross per 24 hour   Intake 150 ml   Output --   Net 150 ml       Laboratory  Recent Labs     02/07/20  1830 02/08/20  0316 02/09/20  1302   WBC 6.7 5.9 4.4*   RBC 4.54 4.48 4.15*   HEMOGLOBIN 13.2 12.9 11.8*   HEMATOCRIT 41.0 40.7 39.0   MCV 90.3 90.8 94.0   MCH 29.1 28.8 28.4   MCHC 32.2* 31.7* 30.3*   RDW 50.4* 50.4* 53.1*   PLATELETCT 156* 146* 126*   MPV 9.9 9.5 10.1     Recent Labs     02/07/20  1830 02/08/20  0316 02/09/20  1302   SODIUM 139 139 144   POTASSIUM 4.0 4.0 3.7   CHLORIDE 107 109 112   CO2 26 26 26   GLUCOSE 106* 102* 82   BUN 16 17 16   CREATININE 0.56 0.50 0.46*   CALCIUM 9.0 8.6 8.4*                   Imaging  DX-CHEST-PORTABLE (1 VIEW)   Final Result      1.  Minimal bibasilar interstitial prominence, which may be due to minimal edema.   2.  Stable cardiomegaly and thoracic aortic aneurysm.      AR-RUNGCUO-7 VIEW   Final Result      1.  Nonobstructive bowel gas pattern. Small amount of stool throughout the colon. Moderate amount of stool in the rectum.   2.  Bilateral staghorn renal stones.   3.  Old L3 compression fracture.      CT-HEAD W/O   Final Result      1.  No acute intracranial process.      2.  Atrophy and small vessel ischemic change.      3.  Interval increase in size of extra-axial mass, consistent with meningioma, in the interhemispheric fissure in the frontal region currently measuring 2.8 cm in maximum diameter compared with 2.0 cm previously.      DX-CHEST-PORTABLE (1 VIEW)   Final Result      1.  Marked cardiomegaly with interstitial prominence possibly representing a component of  interstitial edema.      2.  Again seen thoracic aortic aneurysm.           Assessment/Plan  Complicated UTI (urinary tract infection)- (present on admission)  Assessment & Plan  -Causing acute metabolic encephalopathy, clearing  -Continue IV Rocephin  -Urine culture growing Proteus species  -Not causing sepsis  -Follow blood cultures, no growth to date  -Bilateral CVA tenderness, resolved.  Lidocaine patches if pain returns.  -Bilateral staghorn renal stones on abdominal x-ray    Abdominal pain, acute, left upper quadrant  Assessment & Plan  Tender to palpation  Abdominal x-ray shows some stool in colon, bilateral staghorn renal stones.  Possibly referred pain from renal stones.  Vital signs within normal limits, no leukocytosis  Lidocaine patch as needed  resolved    Dysphagia  Assessment & Plan  SLP evaluation: Full liquid diet, nectar thick liquids. NPO when unable to swallow.  -Adventitious lung sounds  -Unable to clear secretions  -Aspiration precautions  -poor muscular tension in jaw  -Eating for comfort. Family considering comfort care option, hospice.    Dementia (HCC)- (present on admission)  Assessment & Plan  -Chronic with acute worsening due to her urinary tract infection  -CT head: no acute intracranial abnormality  -Referral to hospice placed.  -Family counseled about comfort care option.    Dehydration- (present on admission)  Assessment & Plan  -continue IV fluids  -Dry mucous membranes  -Encourage oral intake as able to swallow    Paroxysmal A-fib (HCC)- (present on admission)  Assessment & Plan  -Currently she is back in atrial fibrillation, rate controlled  -repeat ECG 2/8: stable  -Continue metoprolol  -She is not on anticoagulation other than aspirin, history of type B aortic aneurism with repair.    Hypertension- (present on admission)  Assessment & Plan  -Continue home valsartan, metoprolol, hydrochlorothiazide  -Well-managed on current regimen  -PRN enalapril, has not needed    Dyslipidemia-  (present on admission)  Assessment & Plan  Continue home statin as able to swallow  -Consider discontinuation if patient becomes comfort care    Hypothyroidism- (present on admission)  Assessment & Plan  -Continue home Synthroid: 100 mcg daily  -Most recent TSH was approximately 1 year ago and was normal at 0.59  -Repeat with next labs if patient's family wishes to pursue aggressive treatment. Palliative consult pending.       VTE prophylaxis: Lovenox    KARIN Palacios.

## 2020-02-10 NOTE — PROGRESS NOTES
Patient restless with repositioning and cares. Suctioning completed on patient. Patient repositioned to left side. Patient had large BM and incontinence care done. Patient able to state name. Patient more comfortable and less restless after cares and repositioning.

## 2020-02-10 NOTE — DIETARY
Nutrition services: Day 3 of admit.  89 yo female admitted with complicated UTI.   Consult received for malnutrition score of 2 on admit - wt loss PTA.    Evaluation:  1. Pt is A & O to self only and lives in a group home where she has been on a thickened liquid diet there.   2. Seen by SLP here on 2/8 and now on a nectar thick full liquid diet. Pt is a full feed assist.   3. Pt is noted to have been taking supplements at group home - boost very high calorie supplements will provide BID while here.  4. Weight loss of 14-23# over > 1 year is noted on admitting screen. Current weight 49.3 kg is decreased 2.1 kg from 51.4 kg (9/7/18) over 17 months. This is notable but not significant.     Malnutrition risk: na    Recommendations/Plan:  1. encourage intake of meals and supplements.  2. document intake of all meals and supplements as % taken in ADL's to provide interdisciplinary communication across all shifts   3. monitor daily weights  4. Nutrition rep will continue to see patient for ongoing meal and snack preferences.

## 2020-02-10 NOTE — CONSULTS
Reason for PC Consult: Advance Care Planning    Consulted by: Lisa CADENA    Assessment:  General: 89 yo female admitted on 2/7/2020 for Complicated UTI. PMH: Aortic aneurysm without rupture, Thyroid disorder, UIT, Dementia    Pt was brought in from her , [facility], due to ALOC and reported fever of 100.1.     Dyspnea: No- resp rate 19, 95% on 4 L NC  Last BM: 02/10/20-    Pain: Unable to determine- appears intermittent, BS RN is providing medication  Depression: Mood appropriate for situation-    Dementia: Yes;  6, E    Spiritual:  Is Zoroastrian or spirituality important for coping with this illness? Yes-    Has a  or spiritual provider visit been requested? No    Palliative Performance Scale: 40%    Advance Directive: Advance Directive on File.   DPOA: Yes- Treva Ohdenisa [phone], daughter  POLST: Yes      Code Status: DNR/DNI     Outcome:  Met with the pt and daughter, Treva, at the bedside. Pt sleeping, per flow sheets oriented to self. Spoke with Treva about pts baseline. Pt has been at the  for approx one year, prior to that she and her brother, Arpit and his wife Geeta, were taking turns caring for her in three month stints.   Treva stated that eventually the pt just began to have to many falls at home to be safe any more.     Spoke about Treva's thoughts on Hospice, Treva stated that she wasn't sure what to expect or how to know when it may be time. Discussed progressive nature of dementia, loss of appetite and eventual difficulty with swallowing. Spoke about how this if often the turning piont for many pts and families.   Spoke about pts POLST and AD both which stated that the pt does not wish to have TFs. Explained diet for pleasure and comfort. Spoke about quality of life, spoke about symptom management. Treva stated that she is interested in Hospice but does wish to speak with her brother and to speak with her mothers insurance to make sure that it will be covered.     Let Treva know  "that we can provide her with a list of local Hospice Agencies to go over with her brother prior to making choice. Updated that the Unit  would be able to send a referral once they have picked a company, and the the agency will run her mothers insurance to ensure coverage. Pt has [employer].  Treva stated that her mother has a pension that covers her , and she does have some other \"accounts\" that they can access for extra care, or for an increased priced due to higher level of care at the  if needed.     Left to retrieve choice form from [facility]. Met with SURINDER Muir, discussed that daughter currently at the bedside but that she will need to leave this afternoon for a dental appt.   SURINDER Muir stated that she would see Treva know to discuss her mother. Agreed to meet at the bedside.     Returned and met with Treva who was discussing her mothers case with the Lisa CADENA.   Provided clinical update, discussed FEES, and pts likely current aspiration risk.   Discussed cancelling of FEES if family felt the results would not change the plan of care, which Treva was feeling would be CC and Hospice after verifying with her brother.   Treva elected to have FEES cancelled at this time, and to speak with her brother today to verify POC/GOC.     Provided Treva with list of local Hospice Agencies, let her know to call her mothers  and ask who they work with and have confidence in and that this can assist her with a choice.     Updated: REGINO RN, [facility]    Plan: Likely DC back to  with Hospice support once final choice and arrangements have been completed.     Thank you for allowing Palliative Care to participate in this patient's care. Please feel free to call [phone] with any questions or concerns.  "

## 2020-02-10 NOTE — CARE PLAN
Problem: Nutritional:  Goal: Achieve adequate nutritional intake  Description  Patient will consume 50% of meals and supplements   Outcome: NOT MET  Boost very high calorie supplements to be added BID to meals

## 2020-02-10 NOTE — CARE PLAN
Problem: Communication  Goal: The ability to communicate needs accurately and effectively will improve  Intervention: Reorient patient to environment as needed  Note:   Pt is oriented to self, continual reorientation over the night.      Problem: Venous Thromboembolism (VTW)/Deep Vein Thrombosis (DVT) Prevention:  Goal: Patient will participate in Venous Thrombosis (VTE)/Deep Vein Thrombosis (DVT)Prevention Measures  Intervention: Assess and monitor for anticoagulation complications  Note:   Pt receives Lovenox for subcutaneous for VTE prophylaxis.      Problem: Respiratory:  Goal: Respiratory status will improve  Intervention: Assess and monitor pulmonary status  Note:    in place, pt is on 4L NC. Pt has a congested cough.

## 2020-02-10 NOTE — ASSESSMENT & PLAN NOTE
Tender to palpation  Abdominal x-ray shows some stool in colon, bilateral staghorn renal stones.  Possibly referred pain from renal stones.  Vital signs within normal limits, no leukocytosis  Lidocaine patch as needed  resolved

## 2020-02-10 NOTE — PROGRESS NOTES
[facility] Medicine Daily Progress Note    Date of Service  2/9/2020    Chief Complaint  Altered mental status    Hospital Course    88 y.o. female with a past medical history of dementia and urinary tract infection presented 2/7/2020 with altered mental status noticed at her group home.  Patient was nonverbal, information obtained from her daughter at bedside in emergency department.  Daughter was called and told that the patient was having a fever and was more confused. Daughter noticed a strong urine odor.   Head CT showed no acute abnormalities.  Chest x-ray showed cardiomegaly and likely interstitial edema.  Urinalysis positive for urinary tract infection.  IV Rocephin and fluid resuscitation overnight.      Interval Problem Update  This morning the patient is upright in bed, awake and completely disoriented.  Assessment is limited by patient's difficulty with speech.  She does respond appropriately.  She has 10/10 left upper quadrant abdominal pain and bilateral flank tenderness on palpation.  There have been no bowel movements reported during this hospitalization.  Nursing reports swallowing problems overnight.  Inspiratory wheezes in bilateral upper lung lobes, diminished in bases.  Mucous membranes are dry.    Abdominal x-ray shows bilateral staghorn renal stones, otherwise unremarkable.  No leukocytosis.  Vital signs within normal limits.  Palliative consult pending.  Daughter at bedside is interested in discussing hospice.  Continue IV fluids and antibiotics.  Patient is very likely aspirating.  Repeat x-ray shows edema, no distinct pneumonia at this time.     Consultants/Specialty  None    Code Status  DNAR/DNI    Disposition  TBD, hospice consult pending.    Review of Systems  Review of Systems   Unable to perform ROS: Dementia   Gastrointestinal: Positive for abdominal pain.   Genitourinary: Positive for flank pain.        Physical Exam  Temp:  [36.6 °C (97.8 °F)-37.5 °C (99.5 °F)] 36.6 °C (97.9  °F)  Pulse:  [] 91  Resp:  [20-28] 20  BP: (106-112)/(48-52) 112/49  SpO2:  [93 %-97 %] 96 %    Physical Exam  Vitals signs and nursing note reviewed.   Constitutional:       General: She is not in acute distress.     Appearance: She is well-developed. She is cachectic. She is ill-appearing. She is not diaphoretic.   HENT:      Head: Normocephalic and atraumatic.      Right Ear: External ear normal.      Left Ear: External ear normal.      Nose: Nose normal. No congestion or rhinorrhea.      Mouth/Throat:      Mouth: Mucous membranes are dry.      Pharynx: No oropharyngeal exudate.   Eyes:      General:         Right eye: No discharge.         Left eye: No discharge.   Neck:      Musculoskeletal: Neck supple.      Trachea: No tracheal deviation.   Cardiovascular:      Rate and Rhythm: Rhythm irregularly irregular.      Pulses: Normal pulses.      Heart sounds: No murmur. No friction rub. No gallop.    Pulmonary:      Effort: Pulmonary effort is normal. No respiratory distress.      Breath sounds: No stridor. Examination of the right-middle field reveals wheezing. Examination of the right-lower field reveals decreased breath sounds, wheezing and rhonchi. Examination of the left-lower field reveals decreased breath sounds, wheezing and rhonchi. Decreased breath sounds, wheezing and rhonchi present. No rales.   Chest:      Chest wall: No tenderness.   Abdominal:      General: Bowel sounds are decreased. There is no distension.      Palpations: Abdomen is soft. There is no mass.      Tenderness: There is tenderness in the left upper quadrant. There is right CVA tenderness and left CVA tenderness. There is no guarding.   Musculoskeletal:      Right lower leg: No edema.      Left lower leg: No edema.   Lymphadenopathy:      Cervical: No cervical adenopathy.   Skin:     General: Skin is warm and dry.      Coloration: Skin is not jaundiced.      Findings: No erythema or rash.   Neurological:      Mental Status: She is  easily aroused. Mental status is at baseline.      GCS: GCS eye subscore is 3. GCS verbal subscore is 2. GCS motor subscore is 5.      Motor: Weakness and atrophy present.      Comments: minimally verbal. Facial droop, chronic   Psychiatric:         Speech: She is noncommunicative.         Behavior: Behavior is cooperative.         Fluids    Intake/Output Summary (Last 24 hours) at 2/9/2020 1627  Last data filed at 2/9/2020 0600  Gross per 24 hour   Intake 1200 ml   Output --   Net 1200 ml       Laboratory  Recent Labs     02/07/20  1830 02/08/20  0316 02/09/20  1302   WBC 6.7 5.9 4.4*   RBC 4.54 4.48 4.15*   HEMOGLOBIN 13.2 12.9 11.8*   HEMATOCRIT 41.0 40.7 39.0   MCV 90.3 90.8 94.0   MCH 29.1 28.8 28.4   MCHC 32.2* 31.7* 30.3*   RDW 50.4* 50.4* 53.1*   PLATELETCT 156* 146* 126*   MPV 9.9 9.5 10.1     Recent Labs     02/07/20  1830 02/08/20  0316 02/09/20  1302   SODIUM 139 139 144   POTASSIUM 4.0 4.0 3.7   CHLORIDE 107 109 112   CO2 26 26 26   GLUCOSE 106* 102* 82   BUN 16 17 16   CREATININE 0.56 0.50 0.46*   CALCIUM 9.0 8.6 8.4*                   Imaging  DX-CHEST-PORTABLE (1 VIEW)   Final Result      1.  Minimal bibasilar interstitial prominence, which may be due to minimal edema.   2.  Stable cardiomegaly and thoracic aortic aneurysm.      [id number] VIEW   Final Result      1.  Nonobstructive bowel gas pattern. Small amount of stool throughout the colon. Moderate amount of stool in the rectum.   2.  Bilateral staghorn renal stones.   3.  Old L3 compression fracture.      CT-HEAD W/O   Final Result      1.  No acute intracranial process.      2.  Atrophy and small vessel ischemic change.      3.  Interval increase in size of extra-axial mass, consistent with meningioma, in the interhemispheric fissure in the frontal region currently measuring 2.8 cm in maximum diameter compared with 2.0 cm previously.      DX-CHEST-PORTABLE (1 VIEW)   Final Result      1.  Marked cardiomegaly with interstitial prominence  possibly representing a component of interstitial edema.      2.  Again seen thoracic aortic aneurysm.           Assessment/Plan  Complicated UTI (urinary tract infection)- (present on admission)  Assessment & Plan  -Causing acute metabolic encephalopathy, clearing  -Continue IV Rocephin  -Urine culture growing Proteus species  -Not causing sepsis  -Follow blood cultures, no growth to date  -Bilateral CVA tenderness  -Bilateral staghorn renal stones on abdominal x-ray    Abdominal pain, acute, left upper quadrant  Assessment & Plan  Tender to palpation  Abdominal x-ray shows some stool in colon, bilateral staghorn renal stones.  Possibly referred pain from renal stones.  Vital signs within normal limits, no leukocytosis    Dysphagia  Assessment & Plan  SLP evaluation: Full liquid diet, nectar thick liquids. NPO when unable to swallow.  -Adventitious lung sounds  -Unable to clear secretions  -Aspiration precautions  -poor muscular tension in jaw    Dementia (HCC)- (present on admission)  Assessment & Plan  -Chronic with acute worsening due to her urinary tract infection  -CT head: no acute intracranial abnormality    Dehydration- (present on admission)  Assessment & Plan  -continue IV fluids  -Dry mucous membranes  -Resolving  -Encourage oral intake as able to swallow    Paroxysmal A-fib (HCC)- (present on admission)  Assessment & Plan  -Currently she is back in atrial fibrillation, rate controlled  -repeat ECG 2/8: stable  -Continue metoprolol when able to swallow safely  -She is not on anticoagulation other than aspirin, history of type B aortic aneurism with repair.    Hypertension- (present on admission)  Assessment & Plan  -Continue home valsartan, metoprolol, hydrochlorothiazide  -Well-managed on current regimen  -PRN enalapril    Dyslipidemia- (present on admission)  Assessment & Plan  Continue home statin as able to swallow    Hypothyroidism- (present on admission)  Assessment & Plan  -Continue home Synthroid  at 100 mcg daily  -Most recent TSH was approximately 1 year ago and was normal at 0.59  Repeat with next labs if patient's family wishes to pursue aggressive treatment. Palliative consult pending.       VTE prophylaxis: Lovenox    KARIN Palacios.

## 2020-02-10 NOTE — PALLIATIVE CARE
Spiritual Care Note    Patient's Name: Aurora Farias   MRN: [id number]    YOB: 1931   Age and Gender: 88 y.o. female   Service Area: [place] [address]   Room (and Bed): [place] [address]   Ethnicity or Nationality: White   Primary Language: English   Islam/Spiritual preference: Unknown   Place of Residence: [facility]   Family/Friends/Others Present: Daughter   Clinical Team Present: No   Medical Diagnosis(-es)/Procedure(s): Complicated UTI   Code Status: DNAR/DNI    Date of Admission: 2/7/2020   Length of Stay: 3 days        Spiritual Care Provider Information    Name of Spiritual Care Provider:   Jenifer Nathan  Title of Spiritual Care Provider:     Phone Number:     [phone]  E-mail:      [email or web address]  Total Time:       10 minutes    Spiritual Screen Results    Gen Nursing    Is your spiritual health or inner well-being important to you as you cope with your medical condition?: Yes  Would you like to receive a visit from our Spiritual Care team or your own Restoration or spiritual leader?: Yes  Was spiritual care education provided to the patient?: Declined     Palliative Care    Was spiritual care education provided to the patient?: Declined      Encounter/Request Information    Visited With: Family  Nature of the Visit: Initial, On shift  Continue Visiting: No  Referral From/ Origin of Request: [facility] nursing      Spiritual Assessment     Interacton/Conversation: PT's daughter at bedside indicated that PT was moving to hospice care. She declined future visits from .    Plan: No Further Visits    Notes:    Thank you for allowing Spiritual Care to support this patient and family. Contact [phone] for additional assistance, changes in PT status, or with any questions/concerns.

## 2020-02-10 NOTE — CARE PLAN
Problem: Communication  Goal: The ability to communicate needs accurately and effectively will improve  Outcome: PROGRESSING SLOWER THAN EXPECTED  Note:   Pt unable to communicate needs effectively; hourly rounding in place, family at bedside.      Problem: Bowel/Gastric:  Goal: Normal bowel function is maintained or improved  Outcome: PROGRESSING AS EXPECTED  Note:   Pt given suppository today. Awaiting bm. Pt and family unsure of last bm.

## 2020-02-11 PROCEDURE — 700111 HCHG RX REV CODE 636 W/ 250 OVERRIDE (IP): Performed by: INTERNAL MEDICINE

## 2020-02-11 PROCEDURE — 92526 ORAL FUNCTION THERAPY: CPT

## 2020-02-11 PROCEDURE — 700105 HCHG RX REV CODE 258: Performed by: INTERNAL MEDICINE

## 2020-02-11 PROCEDURE — 770001 HCHG ROOM/CARE - MED/SURG/GYN PRIV*

## 2020-02-11 PROCEDURE — 99232 SBSQ HOSP IP/OBS MODERATE 35: CPT | Performed by: INTERNAL MEDICINE

## 2020-02-11 RX ORDER — LORAZEPAM 2 MG/ML
1 INJECTION INTRAMUSCULAR
Status: DISCONTINUED | OUTPATIENT
Start: 2020-02-11 | End: 2020-02-12 | Stop reason: HOSPADM

## 2020-02-11 RX ORDER — ATROPINE SULFATE 10 MG/ML
2 SOLUTION/ DROPS OPHTHALMIC EVERY 4 HOURS PRN
Status: DISCONTINUED | OUTPATIENT
Start: 2020-02-11 | End: 2020-02-12 | Stop reason: HOSPADM

## 2020-02-11 RX ORDER — MORPHINE SULFATE 10 MG/ML
5 INJECTION, SOLUTION INTRAMUSCULAR; INTRAVENOUS
Status: DISCONTINUED | OUTPATIENT
Start: 2020-02-11 | End: 2020-02-12 | Stop reason: HOSPADM

## 2020-02-11 RX ORDER — LORAZEPAM 2 MG/ML
1 CONCENTRATE ORAL
Status: DISCONTINUED | OUTPATIENT
Start: 2020-02-11 | End: 2020-02-12 | Stop reason: HOSPADM

## 2020-02-11 RX ADMIN — LORAZEPAM 1 MG: 2 INJECTION INTRAMUSCULAR; INTRAVENOUS at 11:06

## 2020-02-11 RX ADMIN — MORPHINE SULFATE 5 MG: 10 INJECTION INTRAVENOUS at 17:32

## 2020-02-11 RX ADMIN — MORPHINE SULFATE 5 MG: 10 INJECTION INTRAVENOUS at 11:07

## 2020-02-11 RX ADMIN — ENOXAPARIN SODIUM 40 MG: 100 INJECTION SUBCUTANEOUS at 06:53

## 2020-02-11 RX ADMIN — CEFTRIAXONE SODIUM 1 G: 1 INJECTION, POWDER, FOR SOLUTION INTRAMUSCULAR; INTRAVENOUS at 06:49

## 2020-02-11 NOTE — DISCHARGE PLANNING
@1106  Agency/Facility Name: [facility]  Outcome: Left message, awaiting call back.    Received Choice form at 0810  Agency/Facility Name: [facility]  Referral sent per Choice form @ 0810

## 2020-02-11 NOTE — CARE PLAN
Problem: Communication  Goal: The ability to communicate needs accurately and effectively will improve  Outcome: NOT MET     Problem: Safety  Goal: Will remain free from injury  Outcome: PROGRESSING AS EXPECTED  Goal: Will remain free from falls  Outcome: PROGRESSING AS EXPECTED     Problem: Skin Integrity  Goal: Risk for impaired skin integrity will decrease  2/11/2020 0429 by Aury Espinosa RTAYLOR  Outcome: NOT MET  Note:   Continuing to have Q2 repositions. Patient unable to turn self. Increased risk for skin breakdown.   2/11/2020 0429 by Aury Espinosa R.N.  Outcome: NOT MET

## 2020-02-11 NOTE — DISCHARGE PLANNING
Anticipated Discharge Disposition: return to group home on hospice    Action: Per Ryann, liaison for [facility], pt has been accepted by [facility]. Plan for pt to return to [facility], [address], [place] [address], phone [phone]. Requested [facility] transport for 2/12/20 at 10:00, auth obtained.      Transportation form and PCS form faxed to [facility] to arrange transport.     Barriers to Discharge:      Plan: to [facility] 2/12/20 via [facility] at 10:00. Notified Stiven with [facility] and BSN of dc time.

## 2020-02-11 NOTE — PALLIATIVE CARE
Palliative Care follow-up  Had received update that family may still looking at FEES. Spoke with Bedside RN Eliane who provided update that she and Dr Morgan spoke with pts daughter, Treva, and that she had decided against FEEs. Pt now CC and will DC back to  with [facility] tomorrow 2/12.     Thank you for allowing Palliative Care to support this patient and family. Contact [phone] for additional assistance, change in patient status, or with any questions/concerns.

## 2020-02-11 NOTE — PROGRESS NOTES
Per speech evaluation, patient's dysphagia worse this morning.  Placed patient on NPO until family and MD can address this.

## 2020-02-11 NOTE — PROGRESS NOTES
Patient moans and groans on occasion throughout night. Patient able to state name and respond to minimal questions. Patient repositioned Q2 hours per order. Patient resting in bed.

## 2020-02-11 NOTE — DISCHARGE PLANNING
Care Transition Team Assessment    Information Source  Orientation : Disoriented to Time, Disoriented to Place, Disoriented to Event  Information Given By: Relative  Informant's Name: Treva Boo         Elopement Risk  Legal Hold: No  Ambulatory or Self Mobile in Wheelchair: No-Not an Elopement Risk  Elopement Risk: Not at Risk for Elopement    Interdisciplinary Discharge Planning  Lives with - Patient's Self Care Capacity: Attendant / Paid Care Giver  Patient or legal guardian wants to designate a caregiver (see row info): No  Support Systems: Family Member(s)  Housing / Facility: intermediate  Name of Care Facility: [facility]  Prior Services: Continuous (24 Hour) Care Giving Per Service    Discharge Preparedness  What is your plan after discharge?: Group home, Other (comment)(Hospice)  What are your discharge supports?: Child  Prior Functional Level: Needs Assist with Activities of Daily Living, Needs Assist with Medication Management    Functional Assesment  Prior Functional Level: Needs Assist with Activities of Daily Living, Needs Assist with Medication Management    Finances  Financial Barriers to Discharge: No  Prescription Coverage: Yes    Vision / Hearing Impairment  Vision Impairment : Yes  Right Eye Vision: Impaired  Left Eye Vision: Impaired  Hearing Impairment : No         Advance Directive  Advance Directive?: DPOA for Health Care, POLST  Durable Power of  Name and Contact : Treva Boo    Domestic Abuse  Have you ever been the victim of abuse or violence?: No  Physical Abuse or Sexual Abuse: No  Verbal Abuse or Emotional Abuse: No  Possible Abuse Reported to:: Not Applicable    Psychological Assessment  History of Substance Abuse: None  History of Psychiatric Problems: No  Non-compliant with Treatment: No  Newly Diagnosed Illness: No    Discharge Risks or Barriers  Discharge risks or barriers?: No    Anticipated Discharge Information  Anticipated discharge disposition: Group home,  Hospice

## 2020-02-11 NOTE — PALLIATIVE CARE
Palliative Care follow-up  Call from pt's dtr requesting referral for hospice be sent to [facility]. PC RN confirmed [facility] as her choice and faxed to [facility] [phone] by AMBROCIO Medrano.       Updated: AMBROCIO Guevara    Plan: hospice f/u with dtr to discuss services    Thank you for allowing Palliative Care to support this patient and family. Contact [phone] for additional assistance, change in patient status, or with any questions/concerns.

## 2020-02-11 NOTE — CARE PLAN
Problem: Safety  Goal: Will remain free from injury  Outcome: PROGRESSING AS EXPECTED  Goal: Will remain free from falls  Outcome: PROGRESSING AS EXPECTED     Problem: Infection  Goal: Will remain free from infection  Outcome: PROGRESSING AS EXPECTED     Problem: Venous Thromboembolism (VTW)/Deep Vein Thrombosis (DVT) Prevention:  Goal: Patient will participate in Venous Thrombosis (VTE)/Deep Vein Thrombosis (DVT)Prevention Measures  Outcome: PROGRESSING AS EXPECTED     Problem: Communication  Goal: The ability to communicate needs accurately and effectively will improve  Outcome: PROGRESSING SLOWER THAN EXPECTED

## 2020-02-11 NOTE — THERAPY
"Pt is an 89 y/o female admitted from group home with AMS due to complicated UTI and renal stones. Pt with baseline history of dementia, thus PLOF/hx obtained from chart and RN. Pt requires Max A x2 people. Demonstrated R lateral lean while sitting EOB with minimal attempt to self correct posture. Pt fatigued quickly while at EOB, standing deferred at this time. PT to follow 2x/wk to assess benefit and progress with therapy interventions.     Physical Therapy Evaluation completed.   Bed Mobility:  Supine to Sit: Maximal Assist(x2)  Transfers: Sit to Stand: Unable to Participate  Gait: Level Of Assist: Unable to Participate       Plan of Care: Will benefit from Physical Therapy 2 times per week  Discharge Recommendations: Equipment: Will Continue to Assess for Equipment Needs.   Post-acute therapy: dependant on GOC/ plan moving forward.    See \"Rehab Therapy-Acute\" Patient Summary Report for complete documentation.     "

## 2020-02-11 NOTE — THERAPY
"Occupational Therapy Evaluation completed.   Functional Status:  Max A x2 supine to sit ->sit to supine.  Total A sock management.  Pt sat EOB with max support for balance.  Pt washed her face after prompting with min A and max support for balance.  Plan of Care: Will benefit from Occupational Therapy 1 times per week  Discharge Recommendations:  Equipment: Will Continue to Assess for Equipment Needs. Anticipate pt can DC back to group home when medically clear.    Pt is 87 y/o F seen for OT evaluation.  Pt admitted from group home with alterred mental status and UTI.  Pt's family considering hospice at this time.  Pt is likely close to baseline but OT will follow 1x week to address deficits and self-care needs, however will sign off if pt goes with hospice.     See \"Rehab Therapy-Acute\" Patient Summary Report for complete documentation.    "

## 2020-02-11 NOTE — THERAPY
"Speech Language Therapy dysphagia treatment completed.     Functional Status:  Pt was seen on this date for dysphagia tx during breakfast. Pt lying in bed and awoke to her name, however pt alertness remained inconsistent throughout duration of session. Pt verbalized \"good morning\" in response to the SLP greeting and verbalized \"yes\" when asked to perform PO trials. Pt voice with reduced intensity, slow rate, and imprecise articulation. Pt was repositioned to ~45 degrees, however she indicated pain beyond that point. Pt head was contracted to the right and SLP unable to reposition. Pt consumed PO trials of NTL (tsp) and pudding (tsp). Pt consumed ~1oz cream of wheat, ~1oz thickened Boost shake, 1 tsp of yogurt. Pt demonstrated symptoms of oropharyngeal dysphagia characterized by: significant tongue thrust, delayed swallow (ranging ~2 - 12 sec), reduced laryngeal elevation, and anterior spillage due to poor labial seal and poor bolus control during oral prep phase. 1 tsp of yogurt administered resulted in immediate coughing, requiring suction. Subsequent NTL PO trials resulted in immediate long bouts coughing, and increase in wet vocal quality. Pt fatigued at end of session with decreased eye opening, so PO trials were discontinued. Pt remains at high risk of aspiration. RN updated and will discuss POC with daughter, as daughter has previously stated she does not pt on want feeding tube. Recommend NPO with tube feed to prevent aspiration. SLP following.     Recommendations: Recommend NPO with tube feed to prevent aspiration. SLP following.    Plan of Care: Will benefit from Speech Therapy 3 times per week    Post-Acute Therapy: Recommend inpatient transitional care services for continued speech therapy services.      See \"Rehab Therapy-Acute\" Patient Summary Report for complete documentation.     "

## 2020-02-11 NOTE — DISCHARGE PLANNING
Received Transport Form @ 8177  Spoke to Genaro BEEBE    Transport is scheduled for 2/12 @1000 going to [facility].  SW informed.

## 2020-02-11 NOTE — CARE PLAN
Problem: Bowel/Gastric:  Goal: Normal bowel function is maintained or improved  Outcome: PROGRESSING AS EXPECTED  Note:   Pt having multiple bowel movements a day, stool softeners given twice daily.     Problem: Knowledge Deficit  Goal: Knowledge of disease process/condition, treatment plan, diagnostic tests, and medications will improve  Outcome: PROGRESSING AS EXPECTED  Note:   Family at bedside is understanding plan of care, palliative at bedside today to discuss options.

## 2020-02-11 NOTE — PROGRESS NOTES
[facility] Medicine Daily Progress Note    Date of Service  2/11/2020    Chief Complaint  Altered mental status    Hospital Course    88 y.o. female with a past medical history of dementia and urinary tract infection presented 2/7/2020 with altered mental status noticed at her group home.  Patient was nonverbal, information obtained from her daughter at bedside in emergency department.  Daughter was called and told that the patient was having a fever and was more confused. Daughter noticed a strong urine odor.   Head CT showed no acute abnormalities.  Chest x-ray showed cardiomegaly and likely interstitial edema.  Urinalysis positive for urinary tract infection.  IV Rocephin and fluid resuscitation overnight.      Interval Problem Update  I had a very long discussion with the patient's daughter about her medical condition and further planning.  I updated her about her current medical condition.  She stated that she does not want her to suffer anymore and she wants her to be as comfortable as she can be.  She does not want any heroic measure to save her life as well.  She wanted me to start comfort care measure for end-of-life planning.  In terms of her oxygenation she stated that she will talk to her brother whether to keep her on oxygen or not and will let us know later.  I implemented comfort care measure for her.  I consulted hospice.    Consultants/Specialty  None    Code Status  DNAR/DNI    Disposition  TBD, hospice consult pending.    Review of Systems  Review of Systems   Unable to perform ROS: Dementia      Physical Exam  Temp:  [36.2 °C (97.2 °F)-36.8 °C (98.3 °F)] 36.2 °C (97.2 °F)  Pulse:  [67-99] 69  Resp:  [17-19] 18  BP: ()/(42-59) 114/42  SpO2:  [95 %-99 %] 95 %    Physical Exam  Vitals signs reviewed.   Constitutional:       Appearance: She is well-developed. She is cachectic. She is ill-appearing.   Neck:      Trachea: No tracheal deviation.   Cardiovascular:      Rate and Rhythm: Rhythm  irregularly irregular.   Pulmonary:      Breath sounds: Examination of the right-lower field reveals decreased breath sounds. Examination of the left-lower field reveals decreased breath sounds. Decreased breath sounds present.   Abdominal:      General: Bowel sounds are decreased.   Neurological:      Mental Status: She is easily aroused.      GCS: GCS eye subscore is 3. GCS verbal subscore is 2. GCS motor subscore is 5.      Motor: Atrophy present.      Comments: minimally verbal. Facial droop, chronic   Psychiatric:         Speech: Speech is slurred.         Behavior: Behavior is cooperative.       Fluids    Intake/Output Summary (Last 24 hours) at 2/11/2020 0734  Last data filed at 2/10/2020 1154  Gross per 24 hour   Intake 100 ml   Output --   Net 100 ml       Laboratory  Recent Labs     02/09/20  1302   WBC 4.4*   RBC 4.15*   HEMOGLOBIN 11.8*   HEMATOCRIT 39.0   MCV 94.0   MCH 28.4   MCHC 30.3*   RDW 53.1*   PLATELETCT 126*   MPV 10.1     Recent Labs     02/09/20  1302   SODIUM 144   POTASSIUM 3.7   CHLORIDE 112   CO2 26   GLUCOSE 82   BUN 16   CREATININE 0.46*   CALCIUM 8.4*                   Imaging  DX-CHEST-PORTABLE (1 VIEW)   Final Result      1.  Minimal bibasilar interstitial prominence, which may be due to minimal edema.   2.  Stable cardiomegaly and thoracic aortic aneurysm.      [id number] VIEW   Final Result      1.  Nonobstructive bowel gas pattern. Small amount of stool throughout the colon. Moderate amount of stool in the rectum.   2.  Bilateral staghorn renal stones.   3.  Old L3 compression fracture.      CT-HEAD W/O   Final Result      1.  No acute intracranial process.      2.  Atrophy and small vessel ischemic change.      3.  Interval increase in size of extra-axial mass, consistent with meningioma, in the interhemispheric fissure in the frontal region currently measuring 2.8 cm in maximum diameter compared with 2.0 cm previously.      DX-CHEST-PORTABLE (1 VIEW)   Final Result      1.   Marked cardiomegaly with interstitial prominence possibly representing a component of interstitial edema.      2.  Again seen thoracic aortic aneurysm.           Assessment/Plan  Complicated UTI (urinary tract infection)- (present on admission)  Assessment & Plan  Comfort care    Dysphagia  Assessment & Plan  Comfort care    Dementia (HCC)- (present on admission)  Assessment & Plan  Comfort care    Dehydration- (present on admission)  Assessment & Plan  Comfort care    Dyslipidemia- (present on admission)  Assessment & Plan  Comfort care       VTE prophylaxis: Lovenox    Giuliano Morgan M.D.

## 2020-02-12 VITALS
HEIGHT: 64 IN | RESPIRATION RATE: 22 BRPM | TEMPERATURE: 99.4 F | WEIGHT: 108.69 LBS | HEART RATE: 95 BPM | OXYGEN SATURATION: 95 % | BODY MASS INDEX: 18.56 KG/M2 | SYSTOLIC BLOOD PRESSURE: 127 MMHG | DIASTOLIC BLOOD PRESSURE: 59 MMHG

## 2020-02-12 PROCEDURE — 99239 HOSP IP/OBS DSCHRG MGMT >30: CPT | Performed by: INTERNAL MEDICINE

## 2020-02-12 PROCEDURE — 700111 HCHG RX REV CODE 636 W/ 250 OVERRIDE (IP): Performed by: INTERNAL MEDICINE

## 2020-02-12 RX ADMIN — MORPHINE SULFATE 5 MG: 10 INJECTION INTRAVENOUS at 05:00

## 2020-02-12 RX ADMIN — MORPHINE SULFATE 5 MG: 10 INJECTION INTRAVENOUS at 10:01

## 2020-02-12 RX ADMIN — MORPHINE SULFATE 5 MG: 10 INJECTION INTRAVENOUS at 08:50

## 2020-02-12 RX ADMIN — MORPHINE SULFATE 5 MG: 10 INJECTION INTRAVENOUS at 07:02

## 2020-02-12 NOTE — PROGRESS NOTES
[facility] Medicine Daily Progress Note    Date of Service  2/12/2020    Chief Complaint  Altered mental status    Hospital Course    88 y.o. female with a past medical history of dementia and urinary tract infection presented 2/7/2020 with altered mental status noticed at her group home.  Patient was nonverbal, information obtained from her daughter at bedside in emergency department.  Daughter was called and told that the patient was having a fever and was more confused. Daughter noticed a strong urine odor.   Head CT showed no acute abnormalities.  Chest x-ray showed cardiomegaly and likely interstitial edema.  Urinalysis positive for urinary tract infection.  IV Rocephin and fluid resuscitation overnight.      Interval Problem Update  I had a very long discussion with the patient's daughter about her medical condition and further planning.  I updated her about her current medical condition.  She stated that she does not want her to suffer anymore and she wants her to be as comfortable as she can be.  She does not want any heroic measure to save her life as well.  She wanted me to start comfort care measure for end-of-life planning.  In terms of her oxygenation she stated that she will talk to her brother whether to keep her on oxygen or not and will let us know later.  I implemented comfort care measure for her.  I consulted hospice.    Consultants/Specialty  None    Code Status  DNAR/DNI    Disposition  TBD, hospice consult pending.    Review of Systems  Review of Systems   Unable to perform ROS: Dementia      Physical Exam  Temp:  [36.3 °C (97.4 °F)] 36.3 °C (97.4 °F)  Pulse:  [70] 70  Resp:  [25] 25  BP: (114)/(57) 114/57  SpO2:  [98 %] 98 %    Physical Exam  Vitals signs reviewed.   Constitutional:       Appearance: She is well-developed. She is cachectic. She is ill-appearing.   Neck:      Trachea: No tracheal deviation.   Cardiovascular:      Rate and Rhythm: Rhythm irregularly irregular.   Pulmonary:       Breath sounds: Examination of the right-lower field reveals decreased breath sounds. Examination of the left-lower field reveals decreased breath sounds. Decreased breath sounds present.   Abdominal:      General: Bowel sounds are decreased.   Neurological:      Mental Status: She is easily aroused.      GCS: GCS eye subscore is 3. GCS verbal subscore is 2. GCS motor subscore is 5.      Motor: Atrophy present.      Comments: minimally verbal. Facial droop, chronic   Psychiatric:         Speech: Speech is slurred.         Behavior: Behavior is cooperative.       Fluids  No intake or output data in the 24 hours ending 02/12/20 0733    Laboratory  Recent Labs     02/09/20  1302   WBC 4.4*   RBC 4.15*   HEMOGLOBIN 11.8*   HEMATOCRIT 39.0   MCV 94.0   MCH 28.4   MCHC 30.3*   RDW 53.1*   PLATELETCT 126*   MPV 10.1     Recent Labs     02/09/20  1302   SODIUM 144   POTASSIUM 3.7   CHLORIDE 112   CO2 26   GLUCOSE 82   BUN 16   CREATININE 0.46*   CALCIUM 8.4*                   Imaging  DX-CHEST-PORTABLE (1 VIEW)   Final Result      1.  Minimal bibasilar interstitial prominence, which may be due to minimal edema.   2.  Stable cardiomegaly and thoracic aortic aneurysm.      [id number] VIEW   Final Result      1.  Nonobstructive bowel gas pattern. Small amount of stool throughout the colon. Moderate amount of stool in the rectum.   2.  Bilateral staghorn renal stones.   3.  Old L3 compression fracture.      CT-HEAD W/O   Final Result      1.  No acute intracranial process.      2.  Atrophy and small vessel ischemic change.      3.  Interval increase in size of extra-axial mass, consistent with meningioma, in the interhemispheric fissure in the frontal region currently measuring 2.8 cm in maximum diameter compared with 2.0 cm previously.      DX-CHEST-PORTABLE (1 VIEW)   Final Result      1.  Marked cardiomegaly with interstitial prominence possibly representing a component of interstitial edema.      2.  Again seen thoracic  aortic aneurysm.           Assessment/Plan  Complicated UTI (urinary tract infection)- (present on admission)  Assessment & Plan  Comfort care    Dysphagia  Assessment & Plan  Comfort care    Dementia (HCC)- (present on admission)  Assessment & Plan  Comfort care    Dehydration- (present on admission)  Assessment & Plan  Comfort care    Dyslipidemia- (present on admission)  Assessment & Plan  Comfort care       VTE prophylaxis: Lovenox    Giuliano Morgan M.D.

## 2020-02-12 NOTE — THERAPY
Per chart review, pt has transitioned to comfort care. Acute PT to sign off at this time.     Pinky Rainey, PT, DPT Pager: [phone]

## 2020-02-12 NOTE — DISCHARGE SUMMARY
Discharge Summary    CHIEF COMPLAINT ON ADMISSION  Chief Complaint   Patient presents with   • ALOC     onset apx 1300   • Fever     pwer group home 100.1       Reason for Admission  Fever     Admission Date  2/7/2020    CODE STATUS  Comfort Care/DNR    HPI & HOSPITAL COURSE     88 y.o. female with a past medical history of dementia and urinary tract infection presented 2/7/2020 with altered mental status noticed at her group home.  Patient was nonverbal, information obtained from her daughter at bedside in emergency department.  Daughter was called and told that the patient was having a fever and was more confused. Daughter noticed a strong urine odor.   Head CT showed no acute abnormalities.  Chest x-ray showed cardiomegaly and likely interstitial edema.  Urinalysis positive for urinary tract infection.  IV Rocephin and fluid resuscitation overnight.   Due to his complex medical comorbidity and after long discussion with the daughter she wanted her to be as comfortable as she can and want to be initiated on comfort care for her.  Also palliative and hospice care was consulted.  The patient will be discharged to group home and hospice care will continue to follow her there.  I saw and examined the patient today.    Therefore, she is discharged in fair and stable condition to home with close outpatient follow-up.    The patient met 2-midnight criteria for an inpatient stay at the time of discharge.    Discharge Date  02/12/20      FOLLOW UP ITEMS POST DISCHARGE      DISCHARGE DIAGNOSES  Active Problems:    Complicated UTI (urinary tract infection) POA: Yes    Dehydration POA: Yes    Dementia (HCC) POA: Yes    Dysphagia POA: Unknown    Abdominal pain, acute, left upper quadrant POA: Unknown    Hypertension POA: Yes    Paroxysmal A-fib (HCC) POA: Yes    Hypothyroidism POA: Yes    Dyslipidemia POA: Yes  Resolved Problems:    * No resolved hospital problems. *      FOLLOW UP  No future appointments.  No follow-up  provider specified.    MEDICATIONS ON DISCHARGE     Medication List      STOP taking these medications    aspirin 81 MG tablet     atorvastatin 20 MG Tabs  Commonly known as:  LIPITOR     CRANBERRY PO     levothyroxine 100 MCG Tabs  Commonly known as:  SYNTHROID     metoprolol  MG Tb24  Commonly known as:  TOPROL XL     nitrofurantoin 50 MG Caps  Commonly known as:  MACRODANTIN     polyethylene glycol/lytes Pack  Commonly known as:  MIRALAX     therapeutic multivitamin-minerals Tabs     valsartan-hydrochlorothiazide 160-12.5 MG per tablet  Commonly known as:  DIOVAN-HCT     Vitamin D 50 MCG (2000 UT) Caps            Allergies  Allergies   Allergen Reactions   • Penicillins Unspecified     Per MAR. Unknown reaction       DIET  No orders of the defined types were placed in this encounter.      ACTIVITY  As tolerated.  Weight bearing as tolerated    CONSULTATIONS      PROCEDURES      LABORATORY  Lab Results   Component Value Date    SODIUM 144 02/09/2020    POTASSIUM 3.7 02/09/2020    CHLORIDE 112 02/09/2020    CO2 26 02/09/2020    GLUCOSE 82 02/09/2020    BUN 16 02/09/2020    CREATININE 0.46 (L) 02/09/2020        Lab Results   Component Value Date    WBC 4.4 (L) 02/09/2020    HEMOGLOBIN 11.8 (L) 02/09/2020    HEMATOCRIT 39.0 02/09/2020    PLATELETCT 126 (L) 02/09/2020        Total time of the discharge process exceeds 38 minutes.

## 2020-02-12 NOTE — DISCHARGE INSTRUCTIONS
Discharge Instructions    Discharged to group home by ambulance with relative. Discharged via ambulance, hospital escort: Refused.  Special equipment needed: Not Applicable: will be followed by Hospice    Be sure to schedule a follow-up appointment with your primary care doctor or any specialists as instructed.     Discharge Plan:   Influenza Vaccine Indication: Not indicated: Previously immunized this influenza season and > 8 years of age    I understand that a diet low in cholesterol, fat, and sodium is recommended for good health. Unless I have been given specific instructions below for another diet, I accept this instruction as my diet prescription.   Other diet:     Special Instructions: None    · Is patient discharged on Warfarin / Coumadin?   No     Depression / Suicide Risk    As you are discharged from [facility], it is important to learn how to keep safe from harming yourself.    Recognize the warning signs:  · Abrupt changes in personality, positive or negative- including increase in energy   · Giving away possessions  · Change in eating patterns- significant weight changes-  positive or negative  · Change in sleeping patterns- unable to sleep or sleeping all the time   · Unwillingness or inability to communicate  · Depression  · Unusual sadness, discouragement and loneliness  · Talk of wanting to die  · Neglect of personal appearance   · Rebelliousness- reckless behavior  · Withdrawal from people/activities they love  · Confusion- inability to concentrate     If you or a loved one observes any of these behaviors or has concerns about self-harm, here's what you can do:  · Talk about it- your feelings and reasons for harming yourself  · Remove any means that you might use to hurt yourself (examples: pills, rope, extension cords, firearm)  · Get professional help from the community (Mental Health, Substance Abuse, psychological counseling)  · Do not be alone:Call your Safe Contact- someone  whom you trust who will be there for you.  · Call your local CRISIS HOTLINE 684-9387 or 610-990-0820  · Call your local Children's Mobile Crisis Response Team [place] (589) 232-1492 or www.mokono  · Call the toll free National Suicide Prevention Hotlines   · National Suicide Prevention Lifeline 683-147-FZOP (5253)  · [employer] 800-SUICIDE (832-1973)

## 2020-02-12 NOTE — PROGRESS NOTES
Patient has been resting well throughout the night. Patient had bed bath by CNA and has been repositioned every 2 hours. Patient remains relaxed and calm in bed.

## 2020-02-12 NOTE — PROGRESS NOTES
Patient transferred to [facility] home with Hospice care. Her daughter Treva received discharge instructions. She was transported by [facility]

## 2020-02-12 NOTE — CARE PLAN
Problem: Safety  Goal: Will remain free from injury  Outcome: PROGRESSING AS EXPECTED  Note:   Bed alarm in place    Goal: Will remain free from falls  Outcome: PROGRESSING AS EXPECTED     Problem: Skin Integrity  Goal: Risk for impaired skin integrity will decrease  Outcome: NOT MET

## 2020-02-12 NOTE — PROGRESS NOTES
Received bedside report from Night RN. Pt lethargic. On comfort care. Daughter at bedside. Morphine and Ativan available for pain. Pt to be transferred to group home with Hospice.

## 2020-02-13 LAB
BACTERIA BLD CULT: NORMAL
SIGNIFICANT IND 70042: NORMAL
SITE SITE: NORMAL
SOURCE SOURCE: NORMAL

## 2020-10-15 NOTE — CARE PLAN
Movie put on for distraction. Pt resting in mothers arms comfortably.  Will continue to monitor Problem: Skin Integrity  Goal: Risk for impaired skin integrity will decrease    Intervention: Assess risk factors for impaired skin integrity and/or pressure ulcers  Pt randall score warrants interventions. Q2 turns in place as ordered and waffle overlay in place.   Pt is developing rash on back.

## 2021-01-15 DIAGNOSIS — Z23 NEED FOR VACCINATION: ICD-10-CM
